# Patient Record
Sex: FEMALE | HISPANIC OR LATINO
[De-identification: names, ages, dates, MRNs, and addresses within clinical notes are randomized per-mention and may not be internally consistent; named-entity substitution may affect disease eponyms.]

---

## 2017-07-28 PROBLEM — Z00.00 ENCOUNTER FOR PREVENTIVE HEALTH EXAMINATION: Status: ACTIVE | Noted: 2017-07-28

## 2017-08-17 ENCOUNTER — APPOINTMENT (OUTPATIENT)
Dept: GERIATRICS | Facility: CLINIC | Age: 82
End: 2017-08-17
Payer: MEDICARE

## 2017-08-17 VITALS
HEIGHT: 65 IN | RESPIRATION RATE: 15 BRPM | HEART RATE: 77 BPM | WEIGHT: 105.5 LBS | DIASTOLIC BLOOD PRESSURE: 60 MMHG | SYSTOLIC BLOOD PRESSURE: 130 MMHG | OXYGEN SATURATION: 98 % | BODY MASS INDEX: 17.58 KG/M2 | TEMPERATURE: 98.6 F

## 2017-08-17 LAB
ALBUMIN SERPL ELPH-MCNC: 4.4 G/DL
ALP BLD-CCNC: 68 U/L
ALT SERPL-CCNC: 19 U/L
ANION GAP SERPL CALC-SCNC: 16 MMOL/L
AST SERPL-CCNC: 34 U/L
BASOPHILS # BLD AUTO: 0.03 K/UL
BASOPHILS NFR BLD AUTO: 0.8 %
BILIRUB SERPL-MCNC: 0.3 MG/DL
BUN SERPL-MCNC: 13 MG/DL
CALCIUM SERPL-MCNC: 9.8 MG/DL
CHLORIDE SERPL-SCNC: 99 MMOL/L
CO2 SERPL-SCNC: 27 MMOL/L
CREAT SERPL-MCNC: 0.96 MG/DL
EOSINOPHIL # BLD AUTO: 0.11 K/UL
EOSINOPHIL NFR BLD AUTO: 3.1 %
FOLATE SERPL-MCNC: >20 NG/ML
GLUCOSE SERPL-MCNC: 82 MG/DL
HCT VFR BLD CALC: 36.9 %
HGB BLD-MCNC: 12.1 G/DL
IMM GRANULOCYTES NFR BLD AUTO: 0 %
LYMPHOCYTES # BLD AUTO: 1.41 K/UL
LYMPHOCYTES NFR BLD AUTO: 39.4 %
MAN DIFF?: NORMAL
MCHC RBC-ENTMCNC: 28.9 PG
MCHC RBC-ENTMCNC: 32.8 GM/DL
MCV RBC AUTO: 88.3 FL
MONOCYTES # BLD AUTO: 0.3 K/UL
MONOCYTES NFR BLD AUTO: 8.4 %
NEUTROPHILS # BLD AUTO: 1.73 K/UL
NEUTROPHILS NFR BLD AUTO: 48.3 %
PLATELET # BLD AUTO: 274 K/UL
POTASSIUM SERPL-SCNC: 4.3 MMOL/L
PROT SERPL-MCNC: 8 G/DL
RBC # BLD: 4.18 M/UL
RBC # FLD: 14.2 %
SODIUM SERPL-SCNC: 142 MMOL/L
TSH SERPL-ACNC: 1.99 UIU/ML
VIT B12 SERPL-MCNC: 1671 PG/ML
WBC # FLD AUTO: 3.58 K/UL

## 2017-08-17 PROCEDURE — 99214 OFFICE O/P EST MOD 30 MIN: CPT

## 2017-09-18 ENCOUNTER — TRANSCRIPTION ENCOUNTER (OUTPATIENT)
Age: 82
End: 2017-09-18

## 2018-02-15 ENCOUNTER — APPOINTMENT (OUTPATIENT)
Dept: GERIATRICS | Facility: CLINIC | Age: 83
End: 2018-02-15
Payer: MEDICARE

## 2018-02-15 VITALS
HEIGHT: 65 IN | DIASTOLIC BLOOD PRESSURE: 60 MMHG | HEART RATE: 87 BPM | BODY MASS INDEX: 17.91 KG/M2 | TEMPERATURE: 98.6 F | WEIGHT: 107.5 LBS | RESPIRATION RATE: 15 BRPM | SYSTOLIC BLOOD PRESSURE: 120 MMHG | OXYGEN SATURATION: 98 %

## 2018-02-15 PROCEDURE — 99214 OFFICE O/P EST MOD 30 MIN: CPT

## 2018-02-19 LAB
ANION GAP SERPL CALC-SCNC: 16 MMOL/L
APPEARANCE: CLEAR
BASOPHILS # BLD AUTO: 0.03 K/UL
BASOPHILS NFR BLD AUTO: 0.5 %
BILIRUBIN URINE: NEGATIVE
BLOOD URINE: NEGATIVE
BUN SERPL-MCNC: 16 MG/DL
CALCIUM SERPL-MCNC: 9.9 MG/DL
CHLORIDE SERPL-SCNC: 97 MMOL/L
CO2 SERPL-SCNC: 26 MMOL/L
COLOR: YELLOW
CREAT SERPL-MCNC: 1.01 MG/DL
EOSINOPHIL # BLD AUTO: 0.17 K/UL
EOSINOPHIL NFR BLD AUTO: 2.8 %
GLUCOSE QUALITATIVE U: NEGATIVE MG/DL
GLUCOSE SERPL-MCNC: 94 MG/DL
HCT VFR BLD CALC: 37.6 %
HGB BLD-MCNC: 12.6 G/DL
IMM GRANULOCYTES NFR BLD AUTO: 0.2 %
KETONES URINE: NEGATIVE
LEUKOCYTE ESTERASE URINE: NEGATIVE
LYMPHOCYTES # BLD AUTO: 1.38 K/UL
LYMPHOCYTES NFR BLD AUTO: 22.4 %
MAN DIFF?: NORMAL
MCHC RBC-ENTMCNC: 30 PG
MCHC RBC-ENTMCNC: 33.5 GM/DL
MCV RBC AUTO: 89.5 FL
MONOCYTES # BLD AUTO: 0.51 K/UL
MONOCYTES NFR BLD AUTO: 8.3 %
NEUTROPHILS # BLD AUTO: 4.06 K/UL
NEUTROPHILS NFR BLD AUTO: 65.8 %
NITRITE URINE: NEGATIVE
PH URINE: 7
PLATELET # BLD AUTO: 242 K/UL
POTASSIUM SERPL-SCNC: 5.1 MMOL/L
PROTEIN URINE: NEGATIVE MG/DL
RBC # BLD: 4.2 M/UL
RBC # FLD: 14.4 %
SODIUM SERPL-SCNC: 139 MMOL/L
SPECIFIC GRAVITY URINE: 1.01
TSH SERPL-ACNC: 1.51 UIU/ML
UROBILINOGEN URINE: NEGATIVE MG/DL
WBC # FLD AUTO: 6.16 K/UL

## 2018-08-16 ENCOUNTER — APPOINTMENT (OUTPATIENT)
Dept: GERIATRICS | Facility: CLINIC | Age: 83
End: 2018-08-16
Payer: MEDICARE

## 2018-08-16 VITALS
DIASTOLIC BLOOD PRESSURE: 60 MMHG | SYSTOLIC BLOOD PRESSURE: 112 MMHG | OXYGEN SATURATION: 98 % | RESPIRATION RATE: 16 BRPM | WEIGHT: 108 LBS | TEMPERATURE: 98.3 F | HEART RATE: 82 BPM | BODY MASS INDEX: 17.97 KG/M2

## 2018-08-16 PROCEDURE — 99214 OFFICE O/P EST MOD 30 MIN: CPT | Mod: GC

## 2018-08-17 LAB
ALBUMIN SERPL ELPH-MCNC: 4.2 G/DL
ALP BLD-CCNC: 58 U/L
ALT SERPL-CCNC: 40 U/L
ANION GAP SERPL CALC-SCNC: 13 MMOL/L
APPEARANCE: CLEAR
AST SERPL-CCNC: 52 U/L
BASOPHILS # BLD AUTO: 0.03 K/UL
BASOPHILS NFR BLD AUTO: 0.7 %
BILIRUB SERPL-MCNC: 0.2 MG/DL
BILIRUBIN URINE: NEGATIVE
BLOOD URINE: NEGATIVE
BUN SERPL-MCNC: 20 MG/DL
CALCIUM SERPL-MCNC: 9.5 MG/DL
CHLORIDE SERPL-SCNC: 101 MMOL/L
CHOLEST SERPL-MCNC: 198 MG/DL
CHOLEST/HDLC SERPL: 2.4 RATIO
CO2 SERPL-SCNC: 28 MMOL/L
COLOR: YELLOW
CREAT SERPL-MCNC: 1.12 MG/DL
EOSINOPHIL # BLD AUTO: 0.13 K/UL
EOSINOPHIL NFR BLD AUTO: 2.9 %
FOLATE SERPL-MCNC: 18.8 NG/ML
GLUCOSE QUALITATIVE U: NEGATIVE MG/DL
GLUCOSE SERPL-MCNC: 92 MG/DL
HCT VFR BLD CALC: 36.6 %
HDLC SERPL-MCNC: 84 MG/DL
HGB BLD-MCNC: 12 G/DL
IMM GRANULOCYTES NFR BLD AUTO: 0.4 %
KETONES URINE: NEGATIVE
LDLC SERPL CALC-MCNC: 100 MG/DL
LEUKOCYTE ESTERASE URINE: NEGATIVE
LYMPHOCYTES # BLD AUTO: 1.61 K/UL
LYMPHOCYTES NFR BLD AUTO: 35.9 %
MAN DIFF?: NORMAL
MCHC RBC-ENTMCNC: 30 PG
MCHC RBC-ENTMCNC: 32.8 GM/DL
MCV RBC AUTO: 91.5 FL
MONOCYTES # BLD AUTO: 0.36 K/UL
MONOCYTES NFR BLD AUTO: 8 %
NEUTROPHILS # BLD AUTO: 2.34 K/UL
NEUTROPHILS NFR BLD AUTO: 52.1 %
NITRITE URINE: NEGATIVE
PH URINE: 6.5
PLATELET # BLD AUTO: 260 K/UL
POTASSIUM SERPL-SCNC: 4.8 MMOL/L
PROT SERPL-MCNC: 7.8 G/DL
PROTEIN URINE: NEGATIVE MG/DL
RBC # BLD: 4 M/UL
RBC # FLD: 14.2 %
SODIUM SERPL-SCNC: 142 MMOL/L
SPECIFIC GRAVITY URINE: 1.02
TRIGL SERPL-MCNC: 70 MG/DL
TSH SERPL-ACNC: 1.98 UIU/ML
UROBILINOGEN URINE: NEGATIVE MG/DL
VIT B12 SERPL-MCNC: 1131 PG/ML
WBC # FLD AUTO: 4.49 K/UL

## 2019-02-14 ENCOUNTER — APPOINTMENT (OUTPATIENT)
Dept: GERIATRICS | Facility: CLINIC | Age: 84
End: 2019-02-14
Payer: MEDICARE

## 2019-02-14 VITALS
BODY MASS INDEX: 17.99 KG/M2 | HEIGHT: 65 IN | TEMPERATURE: 97.6 F | SYSTOLIC BLOOD PRESSURE: 100 MMHG | WEIGHT: 108 LBS | HEART RATE: 92 BPM | DIASTOLIC BLOOD PRESSURE: 60 MMHG | RESPIRATION RATE: 15 BRPM | OXYGEN SATURATION: 96 %

## 2019-02-14 PROCEDURE — 99214 OFFICE O/P EST MOD 30 MIN: CPT

## 2019-02-15 NOTE — PHYSICAL EXAM
[General Appearance - Alert] : alert [General Appearance - In No Acute Distress] : in no acute distress [Sclera] : the sclera and conjunctiva were normal [PERRL With Normal Accommodation] : pupils were equal in size, round, and reactive to light [Extraocular Movements] : extraocular movements were intact [Normal Oral Mucosa] : normal oral mucosa [No Oral Pallor] : no oral pallor [No Oral Cyanosis] : no oral cyanosis [Outer Ear] : the ears and nose were normal in appearance [Oropharynx] : The oropharynx was normal [Neck Appearance] : the appearance of the neck was normal [Neck Cervical Mass (___cm)] : no neck mass was observed [Jugular Venous Distention Increased] : there was no jugular-venous distention [Thyroid Diffuse Enlargement] : the thyroid was not enlarged [Thyroid Nodule] : there were no palpable thyroid nodules [Auscultation Breath Sounds / Voice Sounds] : lungs were clear to auscultation bilaterally [Heart Rate And Rhythm] : heart rate was normal and rhythm regular [Heart Sounds] : normal S1 and S2 [Heart Sounds Gallop] : no gallops [Murmurs] : no murmurs [Heart Sounds Pericardial Friction Rub] : no pericardial rub [Full Pulse] : the pedal pulses are present [Edema] : there was no peripheral edema [Bowel Sounds] : normal bowel sounds [Abdomen Soft] : soft [Abdomen Tenderness] : non-tender [Abdomen Mass (___ Cm)] : no abdominal mass palpated [Cervical Lymph Nodes Enlarged Posterior Bilaterally] : posterior cervical [Cervical Lymph Nodes Enlarged Anterior Bilaterally] : anterior cervical [Supraclavicular Lymph Nodes Enlarged Bilaterally] : supraclavicular [Axillary Lymph Nodes Enlarged Bilaterally] : axillary [Femoral Lymph Nodes Enlarged Bilaterally] : femoral [Inguinal Lymph Nodes Enlarged Bilaterally] : inguinal [No CVA Tenderness] : no ~M costovertebral angle tenderness [No Spinal Tenderness] : no spinal tenderness [Abnormal Walk] : normal gait [Nail Clubbing] : no clubbing  or cyanosis of the fingernails [Musculoskeletal - Swelling] : no joint swelling seen [Motor Tone] : muscle strength and tone were normal [Skin Color & Pigmentation] : normal skin color and pigmentation [Skin Turgor] : normal skin turgor [] : no rash [Deep Tendon Reflexes (DTR)] : deep tendon reflexes were 2+ and symmetric [Sensation] : the sensory exam was normal to light touch and pinprick [No Focal Deficits] : no focal deficits [Total Score ___ / 30] : the patient achieved a score of [unfilled] /30 [Date / Time ___ / 5] : date / time [unfilled] / 5 [Place ___ / 5] : place [unfilled] / 5 [Registration ___ / 3] : registration [unfilled] / 3 [Serial Sevens ___/5] : serial sevens [unfilled] / 5 [Naming 2 Objects ___ / 2] : naming two objects [unfilled] / 2 [Repeating a Sentence ___ / 1] : repeating a sentence [unfilled] / 1 [Writing a Sentence ___ / 1] : write sentence [unfilled] / 1 [3-stage Verbal Command ___ / 3] : three-stage verbal command [unfilled] / 3 [Written Command ___ / 1] : written command [unfilled] / 1 [Copy a Design ___ / 1] : copy a design [unfilled] / 1 [Recall ___ / 3] : recall [unfilled] / 3 [Oriented To Time, Place, And Person] : oriented to person, place, and time [Impaired Insight] : insight and judgment were intact [Affect] : the affect was normal

## 2019-02-15 NOTE — SOCIAL HISTORY
[FreeTextEntry1] : son and dtr [FreeTextEntry2] : good [FreeTextEntry3] : senior program [FreeTextEntry4] : poor [No falls in past year] : Patient reported no falls in the past year [Fully functional (bathing, dressing, toileting, transferring, walking, feeding)] : Fully functional (bathing, dressing, toileting, transferring, walking, feeding) [Full assistance needed] : managing finances [de-identified] : needs guidance [de-identified] : none [de-identified] : No safety concerns identified. [de-identified] : No safety concerns identified.

## 2019-02-15 NOTE — ASSESSMENT
[FreeTextEntry1] : 82 yo female here for followup\par \par 1) Dementia - paranoia, - ngoes to day care and going well.  No weight loss. No agitation. Will increase donepezil.\par 2) BP low - consider decreasing losartan-hctz or\par 3) Weight - stable, actually up 3 pounds\par 4) Living with son - no issues. \par 5)  - has Primary MD , says she had pneumonia vaccine by PMD\par

## 2019-02-15 NOTE — HISTORY OF PRESENT ILLNESS
[FreeTextEntry1] : 82 yo female with dementia, htn, hypothyroid who is here for followup. She takes part in senior program 3 days  a week. There seem to be no issues. No paranoia, she is getting along with people.  She has no agitation acc to son. Pt lives with son who is an [employer] .  No ed visits. No hospitalizations. Eating well. [Moderate] : Stage: Moderate [Stable] : Status: Stable [None] : The patient is currently asymptomatic [0] : 2) Feeling down, depressed, or hopeless: Not at all

## 2019-06-18 ENCOUNTER — TRANSCRIPTION ENCOUNTER (OUTPATIENT)
Age: 84
End: 2019-06-18

## 2019-08-29 ENCOUNTER — APPOINTMENT (OUTPATIENT)
Dept: GERIATRICS | Facility: CLINIC | Age: 84
End: 2019-08-29
Payer: MEDICARE

## 2019-08-29 VITALS
RESPIRATION RATE: 15 BRPM | BODY MASS INDEX: 17.99 KG/M2 | WEIGHT: 108 LBS | TEMPERATURE: 98.7 F | OXYGEN SATURATION: 99 % | DIASTOLIC BLOOD PRESSURE: 60 MMHG | HEART RATE: 75 BPM | SYSTOLIC BLOOD PRESSURE: 100 MMHG | HEIGHT: 65 IN

## 2019-08-29 PROCEDURE — 99214 OFFICE O/P EST MOD 30 MIN: CPT

## 2019-08-29 NOTE — PHYSICAL EXAM
[General Appearance - Alert] : alert [General Appearance - In No Acute Distress] : in no acute distress [Sclera] : the sclera and conjunctiva were normal [Extraocular Movements] : extraocular movements were intact [Normal Oral Mucosa] : normal oral mucosa [PERRL With Normal Accommodation] : pupils were equal in size, round, and reactive to light [No Oral Pallor] : no oral pallor [Outer Ear] : the ears and nose were normal in appearance [No Oral Cyanosis] : no oral cyanosis [Oropharynx] : The oropharynx was normal [Neck Appearance] : the appearance of the neck was normal [Neck Cervical Mass (___cm)] : no neck mass was observed [Jugular Venous Distention Increased] : there was no jugular-venous distention [Thyroid Diffuse Enlargement] : the thyroid was not enlarged [Thyroid Nodule] : there were no palpable thyroid nodules [Auscultation Breath Sounds / Voice Sounds] : lungs were clear to auscultation bilaterally [Heart Rate And Rhythm] : heart rate was normal and rhythm regular [Heart Sounds] : normal S1 and S2 [Heart Sounds Gallop] : no gallops [Murmurs] : no murmurs [Heart Sounds Pericardial Friction Rub] : no pericardial rub [Full Pulse] : the pedal pulses are present [Edema] : there was no peripheral edema [Bowel Sounds] : normal bowel sounds [Abdomen Soft] : soft [Abdomen Tenderness] : non-tender [Abdomen Mass (___ Cm)] : no abdominal mass palpated [Cervical Lymph Nodes Enlarged Posterior Bilaterally] : posterior cervical [Supraclavicular Lymph Nodes Enlarged Bilaterally] : supraclavicular [Cervical Lymph Nodes Enlarged Anterior Bilaterally] : anterior cervical [Axillary Lymph Nodes Enlarged Bilaterally] : axillary [Femoral Lymph Nodes Enlarged Bilaterally] : femoral [Inguinal Lymph Nodes Enlarged Bilaterally] : inguinal [No CVA Tenderness] : no ~M costovertebral angle tenderness [No Spinal Tenderness] : no spinal tenderness [Abnormal Walk] : normal gait [Nail Clubbing] : no clubbing  or cyanosis of the fingernails [Musculoskeletal - Swelling] : no joint swelling seen [Motor Tone] : muscle strength and tone were normal [Skin Color & Pigmentation] : normal skin color and pigmentation [Skin Turgor] : normal skin turgor [] : no rash [Deep Tendon Reflexes (DTR)] : deep tendon reflexes were 2+ and symmetric [Sensation] : the sensory exam was normal to light touch and pinprick [No Focal Deficits] : no focal deficits [Total Score ___ / 30] : the patient achieved a score of [unfilled] /30 [Date / Time ___ / 5] : date / time [unfilled] / 5 [Place ___ / 5] : place [unfilled] / 5 [Registration ___ / 3] : registration [unfilled] / 3 [Serial Sevens ___/5] : serial sevens [unfilled] / 5 [Naming 2 Objects ___ / 2] : naming two objects [unfilled] / 2 [Repeating a Sentence ___ / 1] : repeating a sentence [unfilled] / 1 [Writing a Sentence ___ / 1] : write sentence [unfilled] / 1 [3-stage Verbal Command ___ / 3] : three-stage verbal command [unfilled] / 3 [Written Command ___ / 1] : written command [unfilled] / 1 [Copy a Design ___ / 1] : copy a design [unfilled] / 1 [Recall ___ / 3] : recall [unfilled] / 3 [Impaired Insight] : insight and judgment were intact [Oriented To Time, Place, And Person] : oriented to person, place, and time [Affect] : the affect was normal

## 2019-09-02 NOTE — HISTORY OF PRESENT ILLNESS
[Moderate] : Stage: Moderate [Stable] : Status: Stable [None] : The patient is currently asymptomatic [0] : 2) Feeling down, depressed, or hopeless: Not at all [FreeTextEntry1] : 84 yo female, [place] speaking, with dementia who had recent neuropsychological testing in [place] at [facility] that susggests need for psychopharmological intervention for paranoia.  Pt without significant change in cognitive function. Lives with son.  No safety issues. No falls.  \par \par Saw PMD following a fall. Pt felt nauseous and light headed, had low bp, saw PMD next day. Pt felt better. PMD is cardiologist Dr Leblanc.  \par \par Pt now with low bp but feels well. SBP = 100\par \par No falls, denies dizziness or feeling weak.

## 2019-09-02 NOTE — SOCIAL HISTORY
[No falls in past year] : Patient reported no falls in the past year [Independent] : walking [Some assistance needed] : managing finances [FreeTextEntry1] : son [FreeTextEntry2] : good [FreeTextEntry4] : good [de-identified] : No safety concerns identified. [de-identified] : none [de-identified] : No safety concerns identified.

## 2019-09-02 NOTE — ASSESSMENT
[FreeTextEntry1] : 82 yo Swedish speaking female with dementia, now with low bp, recent fall.\par \par 1) Low BP - stop lisinopril - hctz \par 2) Dementia - continue aricept\par 3) paranoia - discussed antipsychotic vs depression meds. Pt not sad,  Family does not feel paranoia is scary or contributing to pt being unsafe or traumatized. No meds at this time to treat paranoia. Will reconsider if worsens or changes.\par 4) Fall - stop lisinopril- hctz, repeat BP in a months time. WIll notify Dr Leblanc office of change in medication.\par 5) Immunizations - flu shot in October due.\par \par Return in one month. Pt mainly here to see me for dementia but am addressing BP bc low and affecting function recently.

## 2019-09-24 ENCOUNTER — TRANSCRIPTION ENCOUNTER (OUTPATIENT)
Age: 84
End: 2019-09-24

## 2019-09-30 ENCOUNTER — TRANSCRIPTION ENCOUNTER (OUTPATIENT)
Age: 84
End: 2019-09-30

## 2019-11-06 ENCOUNTER — TRANSCRIPTION ENCOUNTER (OUTPATIENT)
Age: 84
End: 2019-11-06

## 2019-12-09 ENCOUNTER — TRANSCRIPTION ENCOUNTER (OUTPATIENT)
Age: 84
End: 2019-12-09

## 2019-12-11 ENCOUNTER — TRANSCRIPTION ENCOUNTER (OUTPATIENT)
Age: 84
End: 2019-12-11

## 2020-02-27 ENCOUNTER — APPOINTMENT (OUTPATIENT)
Dept: GERIATRICS | Facility: CLINIC | Age: 85
End: 2020-02-27
Payer: MEDICARE

## 2020-02-27 VITALS — DIASTOLIC BLOOD PRESSURE: 80 MMHG | SYSTOLIC BLOOD PRESSURE: 142 MMHG

## 2020-02-27 VITALS
TEMPERATURE: 98.2 F | HEIGHT: 60 IN | BODY MASS INDEX: 22.1 KG/M2 | WEIGHT: 112.6 LBS | RESPIRATION RATE: 15 BRPM | HEART RATE: 82 BPM | OXYGEN SATURATION: 98 %

## 2020-02-27 PROCEDURE — 99483 ASSMT & CARE PLN PT COG IMP: CPT

## 2020-02-27 RX ORDER — LISINOPRIL AND HYDROCHLOROTHIAZIDE TABLETS 20; 12.5 MG/1; MG/1
20-12.5 TABLET ORAL DAILY
Refills: 0 | Status: DISCONTINUED | COMMUNITY
Start: 2018-02-15 | End: 2020-02-27

## 2020-04-15 ENCOUNTER — RX RENEWAL (OUTPATIENT)
Age: 85
End: 2020-04-15

## 2020-06-16 ENCOUNTER — TRANSCRIPTION ENCOUNTER (OUTPATIENT)
Age: 85
End: 2020-06-16

## 2020-09-04 ENCOUNTER — TRANSCRIPTION ENCOUNTER (OUTPATIENT)
Age: 85
End: 2020-09-04

## 2020-09-24 ENCOUNTER — APPOINTMENT (OUTPATIENT)
Dept: GERIATRICS | Facility: CLINIC | Age: 85
End: 2020-09-24
Payer: MEDICARE

## 2020-09-24 VITALS
HEART RATE: 90 BPM | HEIGHT: 62.4 IN | RESPIRATION RATE: 16 BRPM | WEIGHT: 110 LBS | TEMPERATURE: 98 F | DIASTOLIC BLOOD PRESSURE: 80 MMHG | SYSTOLIC BLOOD PRESSURE: 140 MMHG | BODY MASS INDEX: 19.99 KG/M2 | OXYGEN SATURATION: 96 %

## 2020-09-24 DIAGNOSIS — Z23 ENCOUNTER FOR IMMUNIZATION: ICD-10-CM

## 2020-09-24 PROCEDURE — 99215 OFFICE O/P EST HI 40 MIN: CPT | Mod: 25

## 2020-09-24 PROCEDURE — 90662 IIV NO PRSV INCREASED AG IM: CPT

## 2020-09-24 PROCEDURE — G0008: CPT

## 2020-09-24 RX ORDER — MULTIVITAMIN/IRON/FOLIC ACID 18MG-0.4MG
600-400 TABLET ORAL
Qty: 30 | Refills: 5 | Status: ACTIVE | COMMUNITY
Start: 2020-09-24

## 2020-09-24 NOTE — ASSESSMENT
[Designated Healthcare Proxy] : Designated healthcare proxy [Name: ___] : Health Care Proxy's Name: [unfilled]  [Relationship: ___] : Relationship: [unfilled]

## 2020-09-27 NOTE — SOCIAL HISTORY
[No falls in past year] : Patient reported no falls in the past year [Independent] : feeding [Some assistance needed] : doing laundry [Full assistance needed] : managing finances [Smoke Detector] : smoke detector [Carbon Monoxide Detector] : carbon monoxide detector [Safety elements used in home] : safety elements used in home [Shower Chair] : shower chair [Night Light] : night light [Anti-Slip Measures] : anti-slip measures [FreeTextEntry1] : dtr [FreeTextEntry2] : good [FreeTextEntry4] : good [Guns at Home] : no guns at home [Grab Bars] : no grab bars [de-identified] : none [de-identified] : No safety concerns identified.

## 2020-09-27 NOTE — PHYSICAL EXAM
[General Appearance - Alert] : alert [General Appearance - In No Acute Distress] : in no acute distress [Sclera] : the sclera and conjunctiva were normal [PERRL With Normal Accommodation] : pupils were equal in size, round, and reactive to light [Extraocular Movements] : extraocular movements were intact [Normal Oral Mucosa] : normal oral mucosa [No Oral Pallor] : no oral pallor [No Oral Cyanosis] : no oral cyanosis [Outer Ear] : the ears and nose were normal in appearance [Oropharynx] : The oropharynx was normal [Neck Appearance] : the appearance of the neck was normal [Neck Cervical Mass (___cm)] : no neck mass was observed [Jugular Venous Distention Increased] : there was no jugular-venous distention [Thyroid Diffuse Enlargement] : the thyroid was not enlarged [Thyroid Nodule] : there were no palpable thyroid nodules [Auscultation Breath Sounds / Voice Sounds] : lungs were clear to auscultation bilaterally [Heart Rate And Rhythm] : heart rate was normal and rhythm regular [Heart Sounds] : normal S1 and S2 [Heart Sounds Gallop] : no gallops [Murmurs] : no murmurs [Heart Sounds Pericardial Friction Rub] : no pericardial rub [Full Pulse] : the pedal pulses are present [Edema] : there was no peripheral edema [Bowel Sounds] : normal bowel sounds [Abdomen Soft] : soft [Abdomen Tenderness] : non-tender [Abdomen Mass (___ Cm)] : no abdominal mass palpated [Cervical Lymph Nodes Enlarged Posterior Bilaterally] : posterior cervical [Cervical Lymph Nodes Enlarged Anterior Bilaterally] : anterior cervical [Supraclavicular Lymph Nodes Enlarged Bilaterally] : supraclavicular [Axillary Lymph Nodes Enlarged Bilaterally] : axillary [Femoral Lymph Nodes Enlarged Bilaterally] : femoral [Inguinal Lymph Nodes Enlarged Bilaterally] : inguinal [No CVA Tenderness] : no ~M costovertebral angle tenderness [No Spinal Tenderness] : no spinal tenderness [Abnormal Walk] : normal gait [Nail Clubbing] : no clubbing  or cyanosis of the fingernails [Musculoskeletal - Swelling] : no joint swelling seen [Motor Tone] : muscle strength and tone were normal [Skin Color & Pigmentation] : normal skin color and pigmentation [Skin Turgor] : normal skin turgor [] : no rash [Deep Tendon Reflexes (DTR)] : deep tendon reflexes were 2+ and symmetric [Sensation] : the sensory exam was normal to light touch and pinprick [No Focal Deficits] : no focal deficits [Oriented To Time, Place, And Person] : oriented to person, place, and time [Impaired Insight] : insight and judgment were intact [Affect] : the affect was normal [FreeTextEntry1] : MMSE in Indonesian = 17/30

## 2020-09-27 NOTE — HISTORY OF PRESENT ILLNESS
[Moderate] : Stage: Moderate [Worse] : Status: Worse [Memory Lapses Or Loss] : worsened memory impairment [Hostility Toward Caregivers] : denies aggression [Sleep Disturbances] : denies sleep disturbances [] : denies wandering [Fixed Beliefs Contradicted By Reality (Delusions)] : denies delusions [FreeTextEntry1] : 83 yo female who is brought into office by lauren, a pharmacist.  Pt moved into daughter's house two weeks ago bc son moved to [place]. Son retired from [employer]. \par Pt getting adjusted to new home and there have been some safety issues as pt less likely to be able to learn new behavior due to dementia.  Pt is Azerbaijani speaking and states all is well.  \par \par Pt is sleeping well, no agitation, but more nervous.  Pt eating well\par \par Safety concern: Pt in new home with gas stove, used to electric stove. Dtr is working.  [facility] is [facility] [employer] [employer] - [facility] well. Nurse has or is to evaluate pt.\par \par Unclear if nurse evaluating pt speaks Azerbaijani. Pt socially pleasant and sweet but in speaking iwht pt she asks questions repeatedly and show anxiety and fear. MMSE to be done in Bengali at this visit.\par \par No falls, no ed\par Hasnt been [facility] any social day programs [place] [facility] no plan on them opening [de-identified] : repeating things 3-4 times in 5-7 monutes, misplacing things. doesn’t know where she is. THinks she is in [place]. Does not know her address.  Staying at home mainly during covid.

## 2021-01-12 ENCOUNTER — APPOINTMENT (OUTPATIENT)
Dept: GERIATRICS | Facility: CLINIC | Age: 86
End: 2021-01-12
Payer: MEDICARE

## 2021-01-12 VITALS
BODY MASS INDEX: 20.04 KG/M2 | SYSTOLIC BLOOD PRESSURE: 140 MMHG | TEMPERATURE: 97.8 F | WEIGHT: 111 LBS | OXYGEN SATURATION: 98 % | HEART RATE: 86 BPM | RESPIRATION RATE: 18 BRPM | DIASTOLIC BLOOD PRESSURE: 76 MMHG

## 2021-01-12 PROCEDURE — 99215 OFFICE O/P EST HI 40 MIN: CPT

## 2021-01-12 PROCEDURE — 99204 OFFICE O/P NEW MOD 45 MIN: CPT

## 2021-01-18 NOTE — PHYSICAL EXAM
[General Appearance - Alert] : alert [General Appearance - In No Acute Distress] : in no acute distress [Sclera] : the sclera and conjunctiva were normal [Extraocular Movements] : extraocular movements were intact [PERRL With Normal Accommodation] : pupils were equal in size, round, and reactive to light [Normal Oral Mucosa] : normal oral mucosa [No Oral Pallor] : no oral pallor [No Oral Cyanosis] : no oral cyanosis [Outer Ear] : the ears and nose were normal in appearance [Neck Appearance] : the appearance of the neck was normal [Oropharynx] : The oropharynx was normal [Neck Cervical Mass (___cm)] : no neck mass was observed [Jugular Venous Distention Increased] : there was no jugular-venous distention [Thyroid Diffuse Enlargement] : the thyroid was not enlarged [Thyroid Nodule] : there were no palpable thyroid nodules [Auscultation Breath Sounds / Voice Sounds] : lungs were clear to auscultation bilaterally [Heart Rate And Rhythm] : heart rate was normal and rhythm regular [Heart Sounds] : normal S1 and S2 [Heart Sounds Gallop] : no gallops [Murmurs] : no murmurs [Heart Sounds Pericardial Friction Rub] : no pericardial rub [Full Pulse] : the pedal pulses are present [Edema] : there was no peripheral edema [Bowel Sounds] : normal bowel sounds [Abdomen Soft] : soft [Abdomen Tenderness] : non-tender [Abdomen Mass (___ Cm)] : no abdominal mass palpated [Cervical Lymph Nodes Enlarged Posterior Bilaterally] : posterior cervical [Cervical Lymph Nodes Enlarged Anterior Bilaterally] : anterior cervical [Supraclavicular Lymph Nodes Enlarged Bilaterally] : supraclavicular [Axillary Lymph Nodes Enlarged Bilaterally] : axillary [Femoral Lymph Nodes Enlarged Bilaterally] : femoral [Inguinal Lymph Nodes Enlarged Bilaterally] : inguinal [No CVA Tenderness] : no ~M costovertebral angle tenderness [No Spinal Tenderness] : no spinal tenderness [Abnormal Walk] : normal gait [Nail Clubbing] : no clubbing  or cyanosis of the fingernails [Musculoskeletal - Swelling] : no joint swelling seen [Motor Tone] : muscle strength and tone were normal [Skin Color & Pigmentation] : normal skin color and pigmentation [Skin Turgor] : normal skin turgor [] : no rash [Sensation] : the sensory exam was normal to light touch and pinprick [Deep Tendon Reflexes (DTR)] : deep tendon reflexes were 2+ and symmetric [No Focal Deficits] : no focal deficits [Oriented To Time, Place, And Person] : oriented to person, place, and time [Impaired Insight] : insight and judgment were intact [Affect] : the affect was normal

## 2021-01-18 NOTE — REVIEW OF SYSTEMS
[Confused] : confusion [As Noted in HPI] : as noted in HPI [Sleep Disturbances] : sleep disturbances [Negative] : Heme/Lymph

## 2021-01-18 NOTE — HISTORY OF PRESENT ILLNESS
[Moderate] : Stage: Moderate [Memory Lapses Or Loss] : worsened memory impairment [Hostility Toward Caregivers] : denies aggression [Sleep Disturbances] : worsened sleep disturbances [] : worsened wandering [Patient Observed To Be Agitated] : agitation [___] : [unfilled] [1] : 2) Feeling down, depressed, or hopeless for several days [FreeTextEntry1] : 86 yo female here for followup.  Pt living with dtr now, a pharmacist. Pt with dementia. Has stopped going to programs and is more isolated than in past years. Pt more sad and has had less socialization.  Pt eating well, Sleeping well. \par \par Dtr has noticed some agitation, poor sleep, wandering and dtr worried.\par Spent time discussing and educting dtr. \par \par No cough, fever, or known exposure to covid

## 2021-01-26 ENCOUNTER — NON-APPOINTMENT (OUTPATIENT)
Age: 86
End: 2021-01-26

## 2021-01-28 ENCOUNTER — NON-APPOINTMENT (OUTPATIENT)
Age: 86
End: 2021-01-28

## 2021-01-28 LAB
ALBUMIN SERPL ELPH-MCNC: 4.7 G/DL
ALP BLD-CCNC: 83 U/L
ALT SERPL-CCNC: 15 U/L
ANION GAP SERPL CALC-SCNC: 11 MMOL/L
AST SERPL-CCNC: 27 U/L
BASOPHILS # BLD AUTO: 0.05 K/UL
BASOPHILS NFR BLD AUTO: 1.3 %
BILIRUB SERPL-MCNC: 0.2 MG/DL
BUN SERPL-MCNC: 16 MG/DL
CALCIUM SERPL-MCNC: 9.3 MG/DL
CHLORIDE SERPL-SCNC: 101 MMOL/L
CHOLEST SERPL-MCNC: 230 MG/DL
CO2 SERPL-SCNC: 28 MMOL/L
CREAT SERPL-MCNC: 1.04 MG/DL
EOSINOPHIL # BLD AUTO: 0.13 K/UL
EOSINOPHIL NFR BLD AUTO: 3.3 %
FOLATE SERPL-MCNC: 10.6 NG/ML
GLUCOSE SERPL-MCNC: 76 MG/DL
HCT VFR BLD CALC: 40.6 %
HDLC SERPL-MCNC: 84 MG/DL
HGB BLD-MCNC: 13.1 G/DL
IMM GRANULOCYTES NFR BLD AUTO: 0.3 %
LDLC SERPL CALC-MCNC: 116 MG/DL
LYMPHOCYTES # BLD AUTO: 1.58 K/UL
LYMPHOCYTES NFR BLD AUTO: 40 %
MAN DIFF?: NORMAL
MCHC RBC-ENTMCNC: 30.3 PG
MCHC RBC-ENTMCNC: 32.3 GM/DL
MCV RBC AUTO: 93.8 FL
MONOCYTES # BLD AUTO: 0.37 K/UL
MONOCYTES NFR BLD AUTO: 9.4 %
NEUTROPHILS # BLD AUTO: 1.81 K/UL
NEUTROPHILS NFR BLD AUTO: 45.7 %
NONHDLC SERPL-MCNC: 146 MG/DL
PLATELET # BLD AUTO: 241 K/UL
POTASSIUM SERPL-SCNC: 4 MMOL/L
PROT SERPL-MCNC: 7.8 G/DL
RBC # BLD: 4.33 M/UL
RBC # FLD: 13.4 %
SODIUM SERPL-SCNC: 141 MMOL/L
TRIGL SERPL-MCNC: 152 MG/DL
TSH SERPL-ACNC: 0.65 UIU/ML
VIT B12 SERPL-MCNC: 1011 PG/ML
WBC # FLD AUTO: 3.95 K/UL

## 2021-02-09 ENCOUNTER — NON-APPOINTMENT (OUTPATIENT)
Age: 86
End: 2021-02-09

## 2021-02-09 ENCOUNTER — APPOINTMENT (OUTPATIENT)
Dept: OTOLARYNGOLOGY | Facility: CLINIC | Age: 86
End: 2021-02-09

## 2021-02-25 ENCOUNTER — APPOINTMENT (OUTPATIENT)
Dept: GERIATRICS | Facility: CLINIC | Age: 86
End: 2021-02-25

## 2021-05-11 ENCOUNTER — APPOINTMENT (OUTPATIENT)
Dept: GERIATRICS | Facility: CLINIC | Age: 86
End: 2021-05-11
Payer: MEDICARE

## 2021-05-11 VITALS
BODY MASS INDEX: 21.53 KG/M2 | HEART RATE: 96 BPM | TEMPERATURE: 98 F | HEIGHT: 62 IN | DIASTOLIC BLOOD PRESSURE: 80 MMHG | WEIGHT: 117 LBS | RESPIRATION RATE: 16 BRPM | OXYGEN SATURATION: 98 % | SYSTOLIC BLOOD PRESSURE: 138 MMHG

## 2021-05-11 PROCEDURE — 99214 OFFICE O/P EST MOD 30 MIN: CPT

## 2021-05-11 RX ORDER — NUT.TX.IMPAIRED DIGESTIVE FXN 0.035-1/ML
LIQUID (ML) ORAL
Qty: 30 | Refills: 5 | Status: COMPLETED | COMMUNITY
Start: 2017-08-17 | End: 2021-05-11

## 2021-05-11 NOTE — ASSESSMENT
[FreeTextEntry1] : 85 year old female with family involvement.\par \par 1) Dementia - MMSE in [place] 15/ 30 last MMSE was 17/30 in Sept 2020.  Lexapro started at last visit.  Tolerating. Will increase now for irritability\par Start seroquel 25 mg prn for paranoia.\par labs reviewed. all normal, mild cholesterol elevation\par continue donepezil and memantine\par \par Needs supervision bc of paranoia, hallucinations and decrease in MMSE.\par \par 2) Depression lexapro tolerated , will increase\par \par 3) HTN - bp controlled\par 4) Hypothyroid - tsh controlled\par 5) Urinary frequency - stable on trospium\par 6) COVID - receieved both vaccines in Jan - Feb\par \par Return in 3 months.\par

## 2021-05-11 NOTE — HISTORY OF PRESENT ILLNESS
[FreeTextEntry1] : 84 yo female with demnentia, htn, depression, hypothyroidism,and urinary frequency. No gait disorders. Pt repeatedl stating she has left knee pain and feels her leg is on left is more swollen.\par \par Pt with high school educaton.  [place]inks she is in [place], has no idea or date or year.\par \par Showing more paranoid, shutting shades so no one will be able to see her, more irritable, seeing people that arent there. Has stated her dead sister was vising one day for lunch.   Labs in January all normal\par \par Pt is irritable but not angry or hitting people.  \par \par No falls. Pt able to dress self and care for self.  No injuries\par \par  [No falls in past year] : Patient reported no falls in the past year [Independent] : walking [Some assistance needed] : feeding [Full assistance needed] : managing finances [Smoke Detector] : smoke detector [Guns at Home] : no guns at home [Carbon Monoxide Detector] : carbon monoxide detector [Safety elements used in home] : no safety elements used in home [Grab Bars] : grab bars [Shower Chair] : no shower chair [Night Light] : no night light [Anti-Slip Measures] : no anti-slip measures [de-identified] : none [de-identified] : she no longer lives with son who was a  [de-identified] : no concerns [[place]] : <10 [0] : 2) Feeling down, depressed, or hopeless: Not at all [PHQ-2 Score ___] : PHQ-2 Score [unfilled] [Moderate] : Stage: Moderate [Memory Lapses Or Loss] : worsened memory impairment [Patient Observed To Be Agitated] : worsened agitation [Hostility Toward Caregivers] : worsened aggression [Sleep Disturbances] : stable sleep disturbances [] : worsened wandering [Fixed Beliefs Contradicted By Reality (Delusions)] : worsened delusions [Difficulty Finding Desired Words] : stable difficulty finding desired words [de-identified] : hallucinating and paranoid [Reviewed no changes] : Reviewed no changes [Designated Healthcare Proxy] : Designated healthcare proxy [Name: ___] : Health Care Proxy's Name: [unfilled]  [Relationship: ___] : Relationship: [unfilled] [AdvancecareDate] : 05/2021 [FreeTextEntry4] : MOLST form given to daughter. Will discuss with son and at next visit we will address quesitong. HCP form at home and dtr will fax or bring at next appt.

## 2021-05-11 NOTE — PHYSICAL EXAM
[General Appearance - Alert] : alert [General Appearance - In No Acute Distress] : in no acute distress [Sclera] : the sclera and conjunctiva were normal [PERRL With Normal Accommodation] : pupils were equal in size, round, and reactive to light [Extraocular Movements] : extraocular movements were intact [No Oral Pallor] : no oral pallor [Normal Oral Mucosa] : normal oral mucosa [No Oral Cyanosis] : no oral cyanosis [Outer Ear] : the ears and nose were normal in appearance [Oropharynx] : The oropharynx was normal [Neck Appearance] : the appearance of the neck was normal [Neck Cervical Mass (___cm)] : no neck mass was observed [Jugular Venous Distention Increased] : there was no jugular-venous distention [Thyroid Diffuse Enlargement] : the thyroid was not enlarged [Thyroid Nodule] : there were no palpable thyroid nodules [Auscultation Breath Sounds / Voice Sounds] : lungs were clear to auscultation bilaterally [Heart Rate And Rhythm] : heart rate was normal and rhythm regular [Heart Sounds] : normal S1 and S2 [Heart Sounds Gallop] : no gallops [Murmurs] : no murmurs [Heart Sounds Pericardial Friction Rub] : no pericardial rub [Full Pulse] : the pedal pulses are present [Edema] : there was no peripheral edema [Bowel Sounds] : normal bowel sounds [Abdomen Soft] : soft [Abdomen Tenderness] : non-tender [Abdomen Mass (___ Cm)] : no abdominal mass palpated [Cervical Lymph Nodes Enlarged Posterior Bilaterally] : posterior cervical [Cervical Lymph Nodes Enlarged Anterior Bilaterally] : anterior cervical [Supraclavicular Lymph Nodes Enlarged Bilaterally] : supraclavicular [Axillary Lymph Nodes Enlarged Bilaterally] : axillary [Femoral Lymph Nodes Enlarged Bilaterally] : femoral [Inguinal Lymph Nodes Enlarged Bilaterally] : inguinal [No CVA Tenderness] : no ~M costovertebral angle tenderness [No Spinal Tenderness] : no spinal tenderness [Abnormal Walk] : normal gait [Nail Clubbing] : no clubbing  or cyanosis of the fingernails [Musculoskeletal - Swelling] : no joint swelling seen [Motor Tone] : muscle strength and tone were normal [FreeTextEntry1] : left claf 33 cm, right calf 31 1/2 , no pain subtle difference.   [Skin Color & Pigmentation] : normal skin color and pigmentation [Skin Turgor] : normal skin turgor [] : no rash [Deep Tendon Reflexes (DTR)] : deep tendon reflexes were 2+ and symmetric [Sensation] : the sensory exam was normal to light touch and pinprick [No Focal Deficits] : no focal deficits [MentalStatusTotal] : 15 /30 in Thai [Oriented To Time, Place, And Person] : oriented to person, place, and time [Impaired Insight] : insight and judgment were intact [Affect] : the affect was normal

## 2021-05-11 NOTE — REVIEW OF SYSTEMS
[As Noted in HPI] : as noted in HPI [Arthralgias] : arthralgias [Joint Pain] : joint pain [Joint Swelling] : no joint swelling [Joint Stiffness] : no joint stiffness [Limb Pain] : limb pain [Limb Swelling] : no limb swelling [Negative] : Heme/Lymph [FreeTextEntry9] : left knee and left leg pain. and complaint of left calf swelling

## 2021-05-13 ENCOUNTER — NON-APPOINTMENT (OUTPATIENT)
Age: 86
End: 2021-05-13

## 2021-08-17 ENCOUNTER — APPOINTMENT (OUTPATIENT)
Dept: GERIATRICS | Facility: CLINIC | Age: 86
End: 2021-08-17

## 2021-08-24 ENCOUNTER — APPOINTMENT (OUTPATIENT)
Dept: GERIATRICS | Facility: CLINIC | Age: 86
End: 2021-08-24
Payer: MEDICARE

## 2021-08-24 VITALS
HEIGHT: 62 IN | BODY MASS INDEX: 22.54 KG/M2 | HEART RATE: 88 BPM | OXYGEN SATURATION: 98 % | DIASTOLIC BLOOD PRESSURE: 78 MMHG | SYSTOLIC BLOOD PRESSURE: 122 MMHG | TEMPERATURE: 97.5 F | WEIGHT: 122.5 LBS | RESPIRATION RATE: 16 BRPM

## 2021-08-24 DIAGNOSIS — R63.4 ABNORMAL WEIGHT LOSS: ICD-10-CM

## 2021-08-24 PROCEDURE — 99213 OFFICE O/P EST LOW 20 MIN: CPT

## 2021-08-30 PROBLEM — R63.4 WEIGHT LOSS: Status: ACTIVE | Noted: 2017-08-17

## 2021-08-30 NOTE — PHYSICAL EXAM
[No Clubbing, Cyanosis] : no clubbing or cyanosis of the fingernails [No Joint Swelling] : no joint swelling seen [Motor Tone] : muscle strength and tone were normal [Normal] : no focal deficits [Normal Affect] : the affect was normal [Normal Mood] : the mood was normal

## 2021-08-30 NOTE — REVIEW OF SYSTEMS
[Arthralgias] : arthralgias [Joint Swelling] : no joint swelling [Joint Stiffness] : joint stiffness [Limb Pain] : limb pain [Negative] : Heme/Lymph [FreeTextEntry9] : pt has pain in legs bilaterally,

## 2021-08-30 NOTE — ASSESSMENT
[FreeTextEntry1] : 86 yo female with dementia\par \par 1) Dementia - donepezil unchanged\par memantine to once a day bc of somnolence in day\par seroquel , low dose, not used.\par 2) lLeg Pain bilaterally - legs stable. Leg 12 inches bilaerally\par \par 3) Depression - escitalopram \par \par 4) Psychosocial - 24 hour care at home in place and helping patient and family well.\par \par 5) HM - labs reviewed from Jan 2021\par \par 6) Incontinence - using trospium and tolerating. May consider stopping in future if not benefiting pt\par

## 2021-08-30 NOTE — HISTORY OF PRESENT ILLNESS
[FreeTextEntry1] : 84 yo female with dementia living with dtr. No safety evetns since last visit. No falls. No injuries. Dtr pharmaicst. Pt vaccinated and explained covid booster plans after sept 20.\par \par Pt missing day program. Pt now has 24 hour care at home which has helped pt tremendously, dtr very happy.\par \par Gave Rx for seroquel but dtr never used it. The help and redirecting pt has helped pt.   [Moderate] : Stage: Moderate [Worse] : Status: Worse [Memory Lapses Or Loss] : worsened memory impairment [Patient Observed To Be Agitated] : stable agitation [Hostility Toward Caregivers] : improved aggression

## 2022-02-22 ENCOUNTER — APPOINTMENT (OUTPATIENT)
Dept: GERIATRICS | Facility: CLINIC | Age: 87
End: 2022-02-22
Payer: MEDICARE

## 2022-02-22 VITALS
TEMPERATURE: 97.5 F | HEART RATE: 76 BPM | WEIGHT: 125.5 LBS | SYSTOLIC BLOOD PRESSURE: 142 MMHG | OXYGEN SATURATION: 99 % | BODY MASS INDEX: 23.1 KG/M2 | HEIGHT: 62 IN | RESPIRATION RATE: 16 BRPM | DIASTOLIC BLOOD PRESSURE: 80 MMHG

## 2022-02-22 PROCEDURE — 99214 OFFICE O/P EST MOD 30 MIN: CPT

## 2022-02-22 NOTE — SOCIAL HISTORY
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

## 2022-02-22 NOTE — HISTORY OF PRESENT ILLNESS
[FreeTextEntry1] : 85 yo female with dementia and brought in by dtr to office. Pt last seen Aug 2021.  No falls, eating well. Repaeaitng more. Went to visit son [place].  Pt was more confused in unfamiliar environment acc to son.  Pt restarted at day program at [facility] 3 days a week.  Dtr would like another day. \par \par No falls.\par Pt has 24 hour care at home. Eating well. Likes aides too.  \par Reviewed meds.\par Reviewed last labs - lipid panel mildly abnormal, discussed statin.  Pt sensitive to meds and pain it can cause in musculature. [No falls in past year] : Patient reported no falls in the past year [Independent] : transferring/mobility [] : Assistance needed with toileting. [0] : 2) Feeling down, depressed, or hopeless: Not at all (0) [Name: ___] : Health Care Proxy's Name: [unfilled]  [Relationship: ___] : Relationship: [unfilled]

## 2022-02-22 NOTE — ASSESSMENT
[FreeTextEntry1] : 87 yo fmeale with dementia\par \par 1) BP borderline , weight increased. If increased at next visit will start somehting.\par \par 2) Weight gain - will follow, appetite good\par \par 3)  Dementia - signs of continued decline. but safe\par \par 4) Depression - no evidence. COntinue lexapro for now.\par \par 5) Hypothyrodism - check  tsh\par \par 6) HM - up to date on immunizations, may need second shingrix but wait til determine need for another covid booster.

## 2022-02-22 NOTE — PHYSICAL EXAM
[No Clubbing, Cyanosis] : no clubbing or cyanosis of the fingernails [No Joint Swelling] : no joint swelling seen [Motor Tone] : muscle strength and tone were normal [Normal] : no focal deficits [Normal Affect] : the affect was normal [Normal Mood] : the mood was normal [de-identified] : repeating, pleasant

## 2022-02-23 LAB
ALBUMIN SERPL ELPH-MCNC: 4.4 G/DL
ALP BLD-CCNC: 94 U/L
ALT SERPL-CCNC: 12 U/L
ANION GAP SERPL CALC-SCNC: 11 MMOL/L
AST SERPL-CCNC: 25 U/L
BASOPHILS # BLD AUTO: 0.06 K/UL
BASOPHILS NFR BLD AUTO: 1.6 %
BILIRUB SERPL-MCNC: <0.2 MG/DL
BUN SERPL-MCNC: 18 MG/DL
CALCIUM SERPL-MCNC: 9.3 MG/DL
CHLORIDE SERPL-SCNC: 100 MMOL/L
CHOLEST SERPL-MCNC: 229 MG/DL
CHOLEST/HDLC SERPL: 3 RATIO
CO2 SERPL-SCNC: 28 MMOL/L
CREAT SERPL-MCNC: 0.99 MG/DL
EOSINOPHIL # BLD AUTO: 0.25 K/UL
EOSINOPHIL NFR BLD AUTO: 6.7 %
GLUCOSE SERPL-MCNC: 78 MG/DL
HCT VFR BLD CALC: 40.1 %
HDLC SERPL-MCNC: 75 MG/DL
HGB BLD-MCNC: 12.4 G/DL
IMM GRANULOCYTES NFR BLD AUTO: 0.3 %
LDLC SERPL CALC-MCNC: 118 MG/DL
LYMPHOCYTES # BLD AUTO: 1.82 K/UL
LYMPHOCYTES NFR BLD AUTO: 48.5 %
MAN DIFF?: NORMAL
MCHC RBC-ENTMCNC: 29.5 PG
MCHC RBC-ENTMCNC: 30.9 GM/DL
MCV RBC AUTO: 95.5 FL
MONOCYTES # BLD AUTO: 0.43 K/UL
MONOCYTES NFR BLD AUTO: 11.5 %
NEUTROPHILS # BLD AUTO: 1.18 K/UL
NEUTROPHILS NFR BLD AUTO: 31.4 %
NONHDLC SERPL-MCNC: 154 MG/DL
PLATELET # BLD AUTO: 283 K/UL
POTASSIUM SERPL-SCNC: 4.8 MMOL/L
PROT SERPL-MCNC: 7.2 G/DL
RBC # BLD: 4.2 M/UL
RBC # FLD: 13.9 %
SODIUM SERPL-SCNC: 139 MMOL/L
TRIGL SERPL-MCNC: 179 MG/DL
TSH SERPL-ACNC: 2.79 UIU/ML
WBC # FLD AUTO: 3.75 K/UL

## 2022-07-25 ENCOUNTER — NON-APPOINTMENT (OUTPATIENT)
Age: 87
End: 2022-07-25

## 2022-07-28 ENCOUNTER — APPOINTMENT (OUTPATIENT)
Dept: GERIATRICS | Facility: CLINIC | Age: 87
End: 2022-07-28

## 2022-07-28 ENCOUNTER — NON-APPOINTMENT (OUTPATIENT)
Age: 87
End: 2022-07-28

## 2022-07-28 DIAGNOSIS — Z02.9 ENCOUNTER FOR ADMINISTRATIVE EXAMINATIONS, UNSPECIFIED: ICD-10-CM

## 2022-07-28 DIAGNOSIS — B02.23 POSTHERPETIC POLYNEUROPATHY: ICD-10-CM

## 2022-07-28 PROCEDURE — 99213 OFFICE O/P EST LOW 20 MIN: CPT | Mod: 95

## 2022-07-31 PROBLEM — B02.23 SHINGLES (HERPES ZOSTER) POLYNEUROPATHY: Status: ACTIVE | Noted: 2022-07-26

## 2022-07-31 PROBLEM — Z02.9 ADMINISTRATIVE ENCOUNTER: Status: ACTIVE | Noted: 2022-07-28

## 2022-07-31 NOTE — REASON FOR VISIT
[Follow-Up] : a follow-up visit [Family Member] : family member [FreeTextEntry1] : herpes zoster in the neck  [FreeTextEntry3] : daughter

## 2022-07-31 NOTE — REVIEW OF SYSTEMS
[Skin Lesions] : skin lesion [Fever] : no fever [Chills] : no chills [Eye Pain] : no eye pain [Earache] : no earache [Chest Pain] : no chest pain [Palpitations] : no palpitations [Dizziness] : no dizziness [Fainting] : no fainting [Anxiety] : no anxiety [Depression] : no depression [Proptosis] : no proptosis [Muscle Weakness] : no muscle weakness

## 2022-07-31 NOTE — HISTORY OF PRESENT ILLNESS
[Home] : at home, [unfilled] , at the time of the visit. [Medical Office: ([facility])___] : at the medical office located in  [Family Member] : family member [Verbal consent obtained from patient] : the patient, [unfilled] [FreeTextEntry1] : 85 y/o F with pmhx of Dementia, HTN, hypothyroidism, OA. Telehealth encounter hold with patient through telehealth. Daughter and son in law present during the encounter. Appointment is for follow up after herpes infection in the right side of the neck and patient needs a letter before to come back to Adult day health program. Pt and pt's dtr reports that lesion have healed and pain has resolved. Pt received Valacyclovir. GAbapentin was ordered, but it was not used as pain is controlled now.  [FreeTextEntry3] : daughter

## 2022-07-31 NOTE — PHYSICAL EXAM
[Normal] : the sclera and conjunctiva were normal, extraocular movements were intact, pupils were equal in size, round, and reactive to light [No Focal Deficits] : no focal deficits [Normal Affect] : the affect was normal [de-identified] : multiple hyperpigmented lesion in the right side of the neck, no crust, no oozing, no vesicle

## 2022-08-08 NOTE — HISTORY OF PRESENT ILLNESS
[Home] : at home, [unfilled] , at the time of the visit. [Medical Office: ([facility])___] : at the medical office located in  Infant noted with HR ranging from 80-82 and unable to obtain an axillary temperature on 4T. Infant brought to 5T NBN [Family Member] : family member [Verbal consent obtained from patient] : the patient, [unfilled] [FreeTextEntry3] : daughter [FreeTextEntry1] : 85 y/o F with pmhx of Dementia, HTN, hypothyroidism, OA. Telehealth encounter hold with patient through telehealth. Daughter and son in law present during the encounter. Appointment is for follow up after herpes infection in the right side of the neck and patient needs a letter before to come back to Adult day health program. Pt and pt's dtr reports that lesion have healed and pain has resolved. Pt received Valacyclovir. GAbapentin was ordered, but it was not used as pain is controlled now.

## 2022-08-08 NOTE — PHYSICAL EXAM
[No Focal Deficits] : no focal deficits [Normal Affect] : the affect was normal [de-identified] : multiple hyperpigmented lesion in the right side of the neck, no crust, no oozing, no vesicle

## 2022-08-23 ENCOUNTER — APPOINTMENT (OUTPATIENT)
Dept: GERIATRICS | Facility: CLINIC | Age: 87
End: 2022-08-23

## 2022-08-23 VITALS
SYSTOLIC BLOOD PRESSURE: 148 MMHG | HEART RATE: 80 BPM | DIASTOLIC BLOOD PRESSURE: 80 MMHG | BODY MASS INDEX: 23.26 KG/M2 | RESPIRATION RATE: 16 BRPM | HEIGHT: 62.4 IN | OXYGEN SATURATION: 98 % | WEIGHT: 128 LBS

## 2022-08-23 PROCEDURE — 90677 PCV20 VACCINE IM: CPT

## 2022-08-23 PROCEDURE — G0009: CPT

## 2022-08-23 PROCEDURE — 99214 OFFICE O/P EST MOD 30 MIN: CPT | Mod: 25

## 2022-08-23 RX ORDER — GABAPENTIN 300 MG/1
300 CAPSULE ORAL
Qty: 60 | Refills: 2 | Status: COMPLETED | COMMUNITY
Start: 2022-07-26 | End: 2022-08-23

## 2022-08-27 NOTE — PHYSICAL EXAM
[Normal] : the sclera and conjunctiva were normal, extraocular movements were intact, pupils were equal in size, round, and reactive to light [No Focal Deficits] : no focal deficits [Normal Affect] : the affect was normal [de-identified] : multiple hyperpigmented lesion in the right side of the neck, no crust, no oozing, no vesicle

## 2022-08-27 NOTE — ASSESSMENT
[FreeTextEntry1] : 86 o with dementia, burping, no constipation\par \par 1) Burping - prilosec\par 2) CHeck labs\par 3) HM - prevnar 20 due.

## 2022-08-27 NOTE — HISTORY OF PRESENT ILLNESS
[FreeTextEntry1] : 85 yo female who recently had shingles, completed valacycolvir and had pain. Given gabapentin but pt never took.  Pt doing well. Here for update. Pt with aide. Gaining weight now.  Going back to her program in day. Dtr adjusting to having an aide in the home.\par \par Dtr asking about mammogram, bone density, colonoscopy and gyn exam . We had a long discussion about preventive health efforts in 85 yo woman with dementia...agreed to not do unless she has complaints.\par \par Pt is doing well, agitation controlled. Sleeping well.\par \par Pt having significant burping, for a long time, years.  [No falls in past year] : Patient reported no falls in the past year [Independent] : transferring/mobility [Completely Dependent] : Completely dependent. [0] : 2) Feeling down, depressed, or hopeless: Not at all (0) [Moderate] : Stage: Moderate [Memory Lapses Or Loss] : worsened memory impairment [Patient Observed To Be Agitated] : stable agitation [Hostility Toward Caregivers] : stable aggression [Sleep Disturbances] : denies sleep disturbances [] : denies wandering [Fixed Beliefs Contradicted By Reality (Delusions)] : denies delusions [Difficulty Finding Desired Words] : denies difficulty finding desired words [Designated Healthcare Proxy] : Designated healthcare proxy [Relationship: ___] : Relationship: [unfilled]

## 2022-09-02 LAB
25(OH)D3 SERPL-MCNC: 47.6 NG/ML
ALBUMIN SERPL ELPH-MCNC: 4.5 G/DL
ALP BLD-CCNC: 82 U/L
ALT SERPL-CCNC: 11 U/L
ANION GAP SERPL CALC-SCNC: 11 MMOL/L
AST SERPL-CCNC: 21 U/L
BASOPHILS # BLD AUTO: 0.07 K/UL
BASOPHILS NFR BLD AUTO: 1.2 %
BILIRUB SERPL-MCNC: 0.2 MG/DL
BUN SERPL-MCNC: 16 MG/DL
CALCIUM SERPL-MCNC: 9.4 MG/DL
CHLORIDE SERPL-SCNC: 102 MMOL/L
CHOLEST SERPL-MCNC: 252 MG/DL
CO2 SERPL-SCNC: 28 MMOL/L
CREAT SERPL-MCNC: 0.96 MG/DL
EGFR: 58 ML/MIN/1.73M2
EOSINOPHIL # BLD AUTO: 0.28 K/UL
EOSINOPHIL NFR BLD AUTO: 4.8 %
GLUCOSE SERPL-MCNC: 103 MG/DL
HCT VFR BLD CALC: 39.1 %
HDLC SERPL-MCNC: 76 MG/DL
HGB BLD-MCNC: 12.4 G/DL
IMM GRANULOCYTES NFR BLD AUTO: 0.3 %
LDLC SERPL CALC-MCNC: 149 MG/DL
LYMPHOCYTES # BLD AUTO: 2.06 K/UL
LYMPHOCYTES NFR BLD AUTO: 35.6 %
MAN DIFF?: NORMAL
MCHC RBC-ENTMCNC: 29.9 PG
MCHC RBC-ENTMCNC: 31.7 GM/DL
MCV RBC AUTO: 94.2 FL
MONOCYTES # BLD AUTO: 0.5 K/UL
MONOCYTES NFR BLD AUTO: 8.7 %
NEUTROPHILS # BLD AUTO: 2.85 K/UL
NEUTROPHILS NFR BLD AUTO: 49.4 %
NONHDLC SERPL-MCNC: 177 MG/DL
PLATELET # BLD AUTO: 297 K/UL
POTASSIUM SERPL-SCNC: 4.9 MMOL/L
PROT SERPL-MCNC: 7.3 G/DL
RBC # BLD: 4.15 M/UL
RBC # FLD: 14.3 %
SODIUM SERPL-SCNC: 141 MMOL/L
TRIGL SERPL-MCNC: 136 MG/DL
TSH SERPL-ACNC: 1.95 UIU/ML
WBC # FLD AUTO: 5.78 K/UL

## 2022-10-10 ENCOUNTER — RX RENEWAL (OUTPATIENT)
Age: 87
End: 2022-10-10

## 2022-10-24 DIAGNOSIS — R35.0 FREQUENCY OF MICTURITION: ICD-10-CM

## 2022-10-25 ENCOUNTER — LABORATORY RESULT (OUTPATIENT)
Age: 87
End: 2022-10-25

## 2022-10-27 LAB
APPEARANCE: CLEAR
BILIRUBIN URINE: NEGATIVE
BLOOD URINE: NEGATIVE
COLOR: NORMAL
GLUCOSE QUALITATIVE U: NEGATIVE
KETONES URINE: NEGATIVE
LEUKOCYTE ESTERASE URINE: ABNORMAL
NITRITE URINE: NEGATIVE
PH URINE: 6.5
PROTEIN URINE: NEGATIVE
SPECIFIC GRAVITY URINE: 1.01
UROBILINOGEN URINE: NORMAL

## 2023-01-02 ENCOUNTER — RX RENEWAL (OUTPATIENT)
Age: 88
End: 2023-01-02

## 2023-01-11 ENCOUNTER — NON-APPOINTMENT (OUTPATIENT)
Age: 88
End: 2023-01-11

## 2023-01-16 ENCOUNTER — INPATIENT (INPATIENT)
Facility: HOSPITAL | Age: 88
LOS: 2 days | Discharge: NURSING, REHAB OR LONG-TERM CARE | DRG: 86 | End: 2023-01-19
Attending: HOSPITALIST | Admitting: HOSPITALIST
Payer: MEDICARE

## 2023-01-16 VITALS
WEIGHT: 160.06 LBS | HEART RATE: 93 BPM | OXYGEN SATURATION: 99 % | HEIGHT: 64 IN | RESPIRATION RATE: 18 BRPM | SYSTOLIC BLOOD PRESSURE: 159 MMHG | DIASTOLIC BLOOD PRESSURE: 92 MMHG | TEMPERATURE: 98 F

## 2023-01-16 PROCEDURE — 99285 EMERGENCY DEPT VISIT HI MDM: CPT

## 2023-01-16 PROCEDURE — 70450 CT HEAD/BRAIN W/O DYE: CPT | Mod: 26,MA

## 2023-01-16 NOTE — ED ADULT TRIAGE NOTE - CHIEF COMPLAINT QUOTE
Patient BIBEMS from [facility] c/o neck pain. Patient A&Ox2-3, was sitting in the recliner and became agitated hitting the recliner back. Recliner fell back and hit the furens. Witnessed by staff. -head strike. Patient c/o neck pain only. On Lovenox. BOSSMAN Valdez called [phone].

## 2023-01-16 NOTE — ED ADULT NURSE NOTE - OBJECTIVE STATEMENT
Patient BIBEMS from [facility] c/o neck pain. Patient A&Ox2-3, was sitting in the recliner and became agitated hitting the recliner back. Recliner fell back and hit the furens. Witnessed by staff. -head strike. Patient c/o neck pain only. On Lovenox. BOSSMAN Valdez called [phone].

## 2023-01-17 DIAGNOSIS — S32.9XXA FRACTURE OF UNSPECIFIED PARTS OF LUMBOSACRAL SPINE AND PELVIS, INITIAL ENCOUNTER FOR CLOSED FRACTURE: ICD-10-CM

## 2023-01-17 DIAGNOSIS — R77.8 OTHER SPECIFIED ABNORMALITIES OF PLASMA PROTEINS: ICD-10-CM

## 2023-01-17 DIAGNOSIS — E78.5 HYPERLIPIDEMIA, UNSPECIFIED: ICD-10-CM

## 2023-01-17 LAB
ADD ON TEST-SPECIMEN IN LAB: SIGNIFICANT CHANGE UP
ALBUMIN SERPL ELPH-MCNC: 3.1 G/DL — LOW (ref 3.3–5)
ALBUMIN SERPL ELPH-MCNC: 3.3 G/DL — SIGNIFICANT CHANGE UP (ref 3.3–5)
ALP SERPL-CCNC: 78 U/L — SIGNIFICANT CHANGE UP (ref 40–120)
ALP SERPL-CCNC: 85 U/L — SIGNIFICANT CHANGE UP (ref 40–120)
ALT FLD-CCNC: 43 U/L — SIGNIFICANT CHANGE UP (ref 12–78)
ALT FLD-CCNC: 44 U/L — SIGNIFICANT CHANGE UP (ref 12–78)
ANION GAP SERPL CALC-SCNC: 10 MMOL/L — SIGNIFICANT CHANGE UP (ref 5–17)
ANION GAP SERPL CALC-SCNC: 7 MMOL/L — SIGNIFICANT CHANGE UP (ref 5–17)
AST SERPL-CCNC: 44 U/L — HIGH (ref 15–37)
AST SERPL-CCNC: 46 U/L — HIGH (ref 15–37)
BASOPHILS # BLD AUTO: 0.03 K/UL — SIGNIFICANT CHANGE UP (ref 0–0.2)
BASOPHILS # BLD AUTO: 0.04 K/UL — SIGNIFICANT CHANGE UP (ref 0–0.2)
BASOPHILS NFR BLD AUTO: 0.4 % — SIGNIFICANT CHANGE UP (ref 0–2)
BASOPHILS NFR BLD AUTO: 0.6 % — SIGNIFICANT CHANGE UP (ref 0–2)
BILIRUB SERPL-MCNC: 0.5 MG/DL — SIGNIFICANT CHANGE UP (ref 0.2–1.2)
BILIRUB SERPL-MCNC: 0.7 MG/DL — SIGNIFICANT CHANGE UP (ref 0.2–1.2)
BUN SERPL-MCNC: 14 MG/DL — SIGNIFICANT CHANGE UP (ref 7–23)
BUN SERPL-MCNC: 15 MG/DL — SIGNIFICANT CHANGE UP (ref 7–23)
CALCIUM SERPL-MCNC: 8.8 MG/DL — SIGNIFICANT CHANGE UP (ref 8.5–10.1)
CALCIUM SERPL-MCNC: 8.8 MG/DL — SIGNIFICANT CHANGE UP (ref 8.5–10.1)
CHLORIDE SERPL-SCNC: 101 MMOL/L — SIGNIFICANT CHANGE UP (ref 96–108)
CHLORIDE SERPL-SCNC: 99 MMOL/L — SIGNIFICANT CHANGE UP (ref 96–108)
CHOLEST SERPL-MCNC: 179 MG/DL — SIGNIFICANT CHANGE UP
CO2 SERPL-SCNC: 29 MMOL/L — SIGNIFICANT CHANGE UP (ref 22–31)
CO2 SERPL-SCNC: 30 MMOL/L — SIGNIFICANT CHANGE UP (ref 22–31)
CREAT SERPL-MCNC: 0.8 MG/DL — SIGNIFICANT CHANGE UP (ref 0.5–1.3)
CREAT SERPL-MCNC: 0.81 MG/DL — SIGNIFICANT CHANGE UP (ref 0.5–1.3)
EGFR: 71 ML/MIN/1.73M2 — SIGNIFICANT CHANGE UP
EGFR: 72 ML/MIN/1.73M2 — SIGNIFICANT CHANGE UP
EOSINOPHIL # BLD AUTO: 0.14 K/UL — SIGNIFICANT CHANGE UP (ref 0–0.5)
EOSINOPHIL # BLD AUTO: 0.2 K/UL — SIGNIFICANT CHANGE UP (ref 0–0.5)
EOSINOPHIL NFR BLD AUTO: 2 % — SIGNIFICANT CHANGE UP (ref 0–6)
EOSINOPHIL NFR BLD AUTO: 2.7 % — SIGNIFICANT CHANGE UP (ref 0–6)
FLUAV AG NPH QL: SIGNIFICANT CHANGE UP
FLUBV AG NPH QL: SIGNIFICANT CHANGE UP
GLUCOSE SERPL-MCNC: 108 MG/DL — HIGH (ref 70–99)
GLUCOSE SERPL-MCNC: 115 MG/DL — HIGH (ref 70–99)
HCT VFR BLD CALC: 30.8 % — LOW (ref 34.5–45)
HCT VFR BLD CALC: 32.8 % — LOW (ref 34.5–45)
HDLC SERPL-MCNC: 56 MG/DL — SIGNIFICANT CHANGE UP
HGB BLD-MCNC: 10.3 G/DL — LOW (ref 11.5–15.5)
HGB BLD-MCNC: 11.1 G/DL — LOW (ref 11.5–15.5)
IMM GRANULOCYTES NFR BLD AUTO: 0.6 % — SIGNIFICANT CHANGE UP (ref 0–0.9)
IMM GRANULOCYTES NFR BLD AUTO: 0.7 % — SIGNIFICANT CHANGE UP (ref 0–0.9)
LIPID PNL WITH DIRECT LDL SERPL: 104 MG/DL — HIGH
LYMPHOCYTES # BLD AUTO: 1.07 K/UL — SIGNIFICANT CHANGE UP (ref 1–3.3)
LYMPHOCYTES # BLD AUTO: 1.26 K/UL — SIGNIFICANT CHANGE UP (ref 1–3.3)
LYMPHOCYTES # BLD AUTO: 15.5 % — SIGNIFICANT CHANGE UP (ref 13–44)
LYMPHOCYTES # BLD AUTO: 17.1 % — SIGNIFICANT CHANGE UP (ref 13–44)
MCHC RBC-ENTMCNC: 30.2 PG — SIGNIFICANT CHANGE UP (ref 27–34)
MCHC RBC-ENTMCNC: 30.3 PG — SIGNIFICANT CHANGE UP (ref 27–34)
MCHC RBC-ENTMCNC: 33.4 GM/DL — SIGNIFICANT CHANGE UP (ref 32–36)
MCHC RBC-ENTMCNC: 33.8 GM/DL — SIGNIFICANT CHANGE UP (ref 32–36)
MCV RBC AUTO: 89.1 FL — SIGNIFICANT CHANGE UP (ref 80–100)
MCV RBC AUTO: 90.6 FL — SIGNIFICANT CHANGE UP (ref 80–100)
MONOCYTES # BLD AUTO: 0.59 K/UL — SIGNIFICANT CHANGE UP (ref 0–0.9)
MONOCYTES # BLD AUTO: 0.6 K/UL — SIGNIFICANT CHANGE UP (ref 0–0.9)
MONOCYTES NFR BLD AUTO: 8.2 % — SIGNIFICANT CHANGE UP (ref 2–14)
MONOCYTES NFR BLD AUTO: 8.5 % — SIGNIFICANT CHANGE UP (ref 2–14)
NEUTROPHILS # BLD AUTO: 5.04 K/UL — SIGNIFICANT CHANGE UP (ref 1.8–7.4)
NEUTROPHILS # BLD AUTO: 5.21 K/UL — SIGNIFICANT CHANGE UP (ref 1.8–7.4)
NEUTROPHILS NFR BLD AUTO: 70.9 % — SIGNIFICANT CHANGE UP (ref 43–77)
NEUTROPHILS NFR BLD AUTO: 72.8 % — SIGNIFICANT CHANGE UP (ref 43–77)
NON HDL CHOLESTEROL: 123 MG/DL — SIGNIFICANT CHANGE UP
NT-PROBNP SERPL-SCNC: 454 PG/ML — HIGH (ref 0–450)
PLATELET # BLD AUTO: 276 K/UL — SIGNIFICANT CHANGE UP (ref 150–400)
PLATELET # BLD AUTO: 291 K/UL — SIGNIFICANT CHANGE UP (ref 150–400)
POTASSIUM SERPL-MCNC: 3.9 MMOL/L — SIGNIFICANT CHANGE UP (ref 3.5–5.3)
POTASSIUM SERPL-MCNC: 3.9 MMOL/L — SIGNIFICANT CHANGE UP (ref 3.5–5.3)
POTASSIUM SERPL-SCNC: 3.9 MMOL/L — SIGNIFICANT CHANGE UP (ref 3.5–5.3)
POTASSIUM SERPL-SCNC: 3.9 MMOL/L — SIGNIFICANT CHANGE UP (ref 3.5–5.3)
PROT SERPL-MCNC: 7 GM/DL — SIGNIFICANT CHANGE UP (ref 6–8.3)
PROT SERPL-MCNC: 7.6 GM/DL — SIGNIFICANT CHANGE UP (ref 6–8.3)
RBC # BLD: 3.4 M/UL — LOW (ref 3.8–5.2)
RBC # BLD: 3.68 M/UL — LOW (ref 3.8–5.2)
RBC # FLD: 13.8 % — SIGNIFICANT CHANGE UP (ref 10.3–14.5)
RBC # FLD: 13.9 % — SIGNIFICANT CHANGE UP (ref 10.3–14.5)
RSV RNA NPH QL NAA+NON-PROBE: SIGNIFICANT CHANGE UP
SARS-COV-2 RNA SPEC QL NAA+PROBE: SIGNIFICANT CHANGE UP
SODIUM SERPL-SCNC: 138 MMOL/L — SIGNIFICANT CHANGE UP (ref 135–145)
SODIUM SERPL-SCNC: 138 MMOL/L — SIGNIFICANT CHANGE UP (ref 135–145)
TRIGL SERPL-MCNC: 98 MG/DL — SIGNIFICANT CHANGE UP
TROPONIN I, HIGH SENSITIVITY RESULT: 115.86 NG/L — HIGH
TROPONIN I, HIGH SENSITIVITY RESULT: 117.51 NG/L — HIGH
TSH SERPL-MCNC: 2.04 UU/ML — SIGNIFICANT CHANGE UP (ref 0.34–4.82)
WBC # BLD: 6.92 K/UL — SIGNIFICANT CHANGE UP (ref 3.8–10.5)
WBC # BLD: 7.35 K/UL — SIGNIFICANT CHANGE UP (ref 3.8–10.5)
WBC # FLD AUTO: 6.92 K/UL — SIGNIFICANT CHANGE UP (ref 3.8–10.5)
WBC # FLD AUTO: 7.35 K/UL — SIGNIFICANT CHANGE UP (ref 3.8–10.5)

## 2023-01-17 PROCEDURE — 71045 X-RAY EXAM CHEST 1 VIEW: CPT | Mod: 26

## 2023-01-17 PROCEDURE — G1004: CPT

## 2023-01-17 PROCEDURE — 76376 3D RENDER W/INTRP POSTPROCES: CPT

## 2023-01-17 PROCEDURE — 93010 ELECTROCARDIOGRAM REPORT: CPT

## 2023-01-17 PROCEDURE — 99497 ADVNCD CARE PLAN 30 MIN: CPT | Mod: 25

## 2023-01-17 PROCEDURE — 84484 ASSAY OF TROPONIN QUANT: CPT

## 2023-01-17 PROCEDURE — 73552 X-RAY EXAM OF FEMUR 2/>: CPT | Mod: LT

## 2023-01-17 PROCEDURE — 72192 CT PELVIS W/O DYE: CPT | Mod: ME

## 2023-01-17 PROCEDURE — 93306 TTE W/DOPPLER COMPLETE: CPT | Mod: 26

## 2023-01-17 PROCEDURE — 97162 PT EVAL MOD COMPLEX 30 MIN: CPT | Mod: GP

## 2023-01-17 PROCEDURE — 73502 X-RAY EXAM HIP UNI 2-3 VIEWS: CPT | Mod: 26,LT

## 2023-01-17 PROCEDURE — 72125 CT NECK SPINE W/O DYE: CPT | Mod: 26,MA

## 2023-01-17 PROCEDURE — 72192 CT PELVIS W/O DYE: CPT | Mod: 26,ME

## 2023-01-17 PROCEDURE — 76376 3D RENDER W/INTRP POSTPROCES: CPT | Mod: 26

## 2023-01-17 PROCEDURE — 97530 THERAPEUTIC ACTIVITIES: CPT | Mod: GP

## 2023-01-17 PROCEDURE — 99223 1ST HOSP IP/OBS HIGH 75: CPT

## 2023-01-17 PROCEDURE — 93306 TTE W/DOPPLER COMPLETE: CPT

## 2023-01-17 PROCEDURE — 73502 X-RAY EXAM HIP UNI 2-3 VIEWS: CPT | Mod: 26,LT,77

## 2023-01-17 PROCEDURE — 73502 X-RAY EXAM HIP UNI 2-3 VIEWS: CPT | Mod: LT

## 2023-01-17 PROCEDURE — 73552 X-RAY EXAM OF FEMUR 2/>: CPT | Mod: 26,LT

## 2023-01-17 PROCEDURE — 12345: CPT | Mod: NC

## 2023-01-17 PROCEDURE — 36415 COLL VENOUS BLD VENIPUNCTURE: CPT

## 2023-01-17 PROCEDURE — 99498 ADVNCD CARE PLAN ADDL 30 MIN: CPT

## 2023-01-17 PROCEDURE — 93005 ELECTROCARDIOGRAM TRACING: CPT

## 2023-01-17 PROCEDURE — 97116 GAIT TRAINING THERAPY: CPT | Mod: GP

## 2023-01-17 RX ORDER — POLYETHYLENE GLYCOL 3350 17 G/17G
17 POWDER, FOR SOLUTION ORAL
Qty: 0 | Refills: 0 | DISCHARGE

## 2023-01-17 RX ORDER — LEVOTHYROXINE SODIUM 125 MCG
1 TABLET ORAL
Qty: 0 | Refills: 0 | DISCHARGE

## 2023-01-17 RX ORDER — TRAMADOL HYDROCHLORIDE 50 MG/1
50 TABLET ORAL EVERY 4 HOURS
Refills: 0 | Status: DISCONTINUED | OUTPATIENT
Start: 2023-01-17 | End: 2023-01-19

## 2023-01-17 RX ORDER — ACETAMINOPHEN 500 MG
975 TABLET ORAL
Qty: 0 | Refills: 0 | DISCHARGE

## 2023-01-17 RX ORDER — ACETAMINOPHEN 500 MG
3 TABLET ORAL
Qty: 0 | Refills: 0 | DISCHARGE

## 2023-01-17 RX ORDER — MORPHINE SULFATE 50 MG/1
2 CAPSULE, EXTENDED RELEASE ORAL EVERY 4 HOURS
Refills: 0 | Status: DISCONTINUED | OUTPATIENT
Start: 2023-01-17 | End: 2023-01-17

## 2023-01-17 RX ORDER — ESCITALOPRAM OXALATE 10 MG/1
10 TABLET, FILM COATED ORAL DAILY
Refills: 0 | Status: DISCONTINUED | OUTPATIENT
Start: 2023-01-17 | End: 2023-01-19

## 2023-01-17 RX ORDER — ACETAMINOPHEN 500 MG
650 TABLET ORAL ONCE
Refills: 0 | Status: COMPLETED | OUTPATIENT
Start: 2023-01-17 | End: 2023-01-17

## 2023-01-17 RX ORDER — DONEPEZIL HYDROCHLORIDE 10 MG/1
1 TABLET, FILM COATED ORAL
Qty: 0 | Refills: 0 | DISCHARGE

## 2023-01-17 RX ORDER — ACETAMINOPHEN 500 MG
975 TABLET ORAL EVERY 8 HOURS
Refills: 0 | Status: DISCONTINUED | OUTPATIENT
Start: 2023-01-17 | End: 2023-01-19

## 2023-01-17 RX ORDER — ENOXAPARIN SODIUM 100 MG/ML
40 INJECTION SUBCUTANEOUS EVERY 24 HOURS
Refills: 0 | Status: DISCONTINUED | OUTPATIENT
Start: 2023-01-17 | End: 2023-01-19

## 2023-01-17 RX ORDER — ENOXAPARIN SODIUM 100 MG/ML
40 INJECTION SUBCUTANEOUS
Qty: 0 | Refills: 0 | DISCHARGE

## 2023-01-17 RX ORDER — PANTOPRAZOLE SODIUM 20 MG/1
40 TABLET, DELAYED RELEASE ORAL
Refills: 0 | Status: DISCONTINUED | OUTPATIENT
Start: 2023-01-17 | End: 2023-01-19

## 2023-01-17 RX ORDER — ONDANSETRON 8 MG/1
4 TABLET, FILM COATED ORAL EVERY 8 HOURS
Refills: 0 | Status: DISCONTINUED | OUTPATIENT
Start: 2023-01-17 | End: 2023-01-19

## 2023-01-17 RX ORDER — ASPIRIN/CALCIUM CARB/MAGNESIUM 324 MG
81 TABLET ORAL DAILY
Refills: 0 | Status: DISCONTINUED | OUTPATIENT
Start: 2023-01-18 | End: 2023-01-19

## 2023-01-17 RX ORDER — NALOXONE HYDROCHLORIDE 4 MG/.1ML
0.4 SPRAY NASAL ONCE
Refills: 0 | Status: DISCONTINUED | OUTPATIENT
Start: 2023-01-17 | End: 2023-01-19

## 2023-01-17 RX ORDER — OMEPRAZOLE 10 MG/1
1 CAPSULE, DELAYED RELEASE ORAL
Qty: 0 | Refills: 0 | DISCHARGE

## 2023-01-17 RX ORDER — ASPIRIN/CALCIUM CARB/MAGNESIUM 324 MG
162 TABLET ORAL ONCE
Refills: 0 | Status: COMPLETED | OUTPATIENT
Start: 2023-01-17 | End: 2023-01-17

## 2023-01-17 RX ORDER — TROSPIUM CHLORIDE 20 MG/1
1 TABLET, FILM COATED ORAL
Qty: 0 | Refills: 0 | DISCHARGE

## 2023-01-17 RX ORDER — NITROGLYCERIN 6.5 MG
0.4 CAPSULE, EXTENDED RELEASE ORAL
Refills: 0 | Status: DISCONTINUED | OUTPATIENT
Start: 2023-01-17 | End: 2023-01-19

## 2023-01-17 RX ORDER — ESCITALOPRAM OXALATE 10 MG/1
1 TABLET, FILM COATED ORAL
Qty: 0 | Refills: 0 | DISCHARGE

## 2023-01-17 RX ORDER — POLYETHYLENE GLYCOL 3350 17 G/17G
17 POWDER, FOR SOLUTION ORAL DAILY
Refills: 0 | Status: DISCONTINUED | OUTPATIENT
Start: 2023-01-17 | End: 2023-01-19

## 2023-01-17 RX ORDER — MEMANTINE HYDROCHLORIDE 10 MG/1
10 TABLET ORAL DAILY
Refills: 0 | Status: DISCONTINUED | OUTPATIENT
Start: 2023-01-17 | End: 2023-01-19

## 2023-01-17 RX ORDER — CYCLOBENZAPRINE HYDROCHLORIDE 10 MG/1
5 TABLET, FILM COATED ORAL EVERY 8 HOURS
Refills: 0 | Status: DISCONTINUED | OUTPATIENT
Start: 2023-01-17 | End: 2023-01-19

## 2023-01-17 RX ORDER — OXYBUTYNIN CHLORIDE 5 MG
5 TABLET ORAL
Refills: 0 | Status: DISCONTINUED | OUTPATIENT
Start: 2023-01-17 | End: 2023-01-19

## 2023-01-17 RX ORDER — LIDOCAINE 4 G/100G
1 CREAM TOPICAL DAILY
Refills: 0 | Status: DISCONTINUED | OUTPATIENT
Start: 2023-01-17 | End: 2023-01-19

## 2023-01-17 RX ORDER — LANOLIN ALCOHOL/MO/W.PET/CERES
3 CREAM (GRAM) TOPICAL AT BEDTIME
Refills: 0 | Status: DISCONTINUED | OUTPATIENT
Start: 2023-01-17 | End: 2023-01-19

## 2023-01-17 RX ORDER — TRAMADOL HYDROCHLORIDE 50 MG/1
1 TABLET ORAL
Qty: 0 | Refills: 0 | DISCHARGE

## 2023-01-17 RX ORDER — MORPHINE SULFATE 50 MG/1
4 CAPSULE, EXTENDED RELEASE ORAL EVERY 4 HOURS
Refills: 0 | Status: DISCONTINUED | OUTPATIENT
Start: 2023-01-17 | End: 2023-01-17

## 2023-01-17 RX ORDER — TRAMADOL HYDROCHLORIDE 50 MG/1
75 TABLET ORAL EVERY 4 HOURS
Refills: 0 | Status: DISCONTINUED | OUTPATIENT
Start: 2023-01-17 | End: 2023-01-17

## 2023-01-17 RX ORDER — HYDRALAZINE HCL 50 MG
10 TABLET ORAL EVERY 6 HOURS
Refills: 0 | Status: DISCONTINUED | OUTPATIENT
Start: 2023-01-17 | End: 2023-01-17

## 2023-01-17 RX ORDER — TRAMADOL HYDROCHLORIDE 50 MG/1
75 TABLET ORAL
Qty: 0 | Refills: 0 | DISCHARGE

## 2023-01-17 RX ORDER — LEVOTHYROXINE SODIUM 125 MCG
25 TABLET ORAL DAILY
Refills: 0 | Status: DISCONTINUED | OUTPATIENT
Start: 2023-01-17 | End: 2023-01-19

## 2023-01-17 RX ORDER — MEMANTINE HYDROCHLORIDE 10 MG/1
1 TABLET ORAL
Qty: 0 | Refills: 0 | DISCHARGE

## 2023-01-17 RX ORDER — MORPHINE SULFATE 50 MG/1
2 CAPSULE, EXTENDED RELEASE ORAL ONCE
Refills: 0 | Status: DISCONTINUED | OUTPATIENT
Start: 2023-01-17 | End: 2023-01-17

## 2023-01-17 RX ORDER — DONEPEZIL HYDROCHLORIDE 10 MG/1
10 TABLET, FILM COATED ORAL AT BEDTIME
Refills: 0 | Status: DISCONTINUED | OUTPATIENT
Start: 2023-01-17 | End: 2023-01-19

## 2023-01-17 RX ADMIN — ENOXAPARIN SODIUM 40 MILLIGRAM(S): 100 INJECTION SUBCUTANEOUS at 09:15

## 2023-01-17 RX ADMIN — Medication 25 MICROGRAM(S): at 05:50

## 2023-01-17 RX ADMIN — LIDOCAINE 1 PATCH: 4 CREAM TOPICAL at 09:14

## 2023-01-17 RX ADMIN — TRAMADOL HYDROCHLORIDE 50 MILLIGRAM(S): 50 TABLET ORAL at 18:54

## 2023-01-17 RX ADMIN — MORPHINE SULFATE 2 MILLIGRAM(S): 50 CAPSULE, EXTENDED RELEASE ORAL at 04:39

## 2023-01-17 RX ADMIN — Medication 650 MILLIGRAM(S): at 03:22

## 2023-01-17 RX ADMIN — Medication 5 MILLIGRAM(S): at 09:15

## 2023-01-17 RX ADMIN — TRAMADOL HYDROCHLORIDE 50 MILLIGRAM(S): 50 TABLET ORAL at 22:59

## 2023-01-17 RX ADMIN — POLYETHYLENE GLYCOL 3350 17 GRAM(S): 17 POWDER, FOR SOLUTION ORAL at 09:15

## 2023-01-17 RX ADMIN — Medication 162 MILLIGRAM(S): at 04:39

## 2023-01-17 RX ADMIN — TRAMADOL HYDROCHLORIDE 50 MILLIGRAM(S): 50 TABLET ORAL at 12:37

## 2023-01-17 RX ADMIN — TRAMADOL HYDROCHLORIDE 50 MILLIGRAM(S): 50 TABLET ORAL at 23:30

## 2023-01-17 RX ADMIN — DONEPEZIL HYDROCHLORIDE 10 MILLIGRAM(S): 10 TABLET, FILM COATED ORAL at 21:06

## 2023-01-17 RX ADMIN — Medication 1 MILLIGRAM(S): at 05:38

## 2023-01-17 RX ADMIN — CYCLOBENZAPRINE HYDROCHLORIDE 5 MILLIGRAM(S): 10 TABLET, FILM COATED ORAL at 19:32

## 2023-01-17 RX ADMIN — MEMANTINE HYDROCHLORIDE 10 MILLIGRAM(S): 10 TABLET ORAL at 09:15

## 2023-01-17 RX ADMIN — ESCITALOPRAM OXALATE 10 MILLIGRAM(S): 10 TABLET, FILM COATED ORAL at 09:15

## 2023-01-17 RX ADMIN — Medication 162 MILLIGRAM(S): at 03:22

## 2023-01-17 RX ADMIN — Medication 3 MILLIGRAM(S): at 21:06

## 2023-01-17 RX ADMIN — Medication 5 MILLIGRAM(S): at 21:06

## 2023-01-17 NOTE — ED PROVIDER NOTE - PROGRESS NOTE DETAILS
Pebbles CHACON: X-ray of pelvis noted to be positive for left pelvis fracture.  Patient with known left-sided pelvis fracture.  No prior images here at [facility].  Likely old fracture that patient is already been diagnosed with.  Also noted to have elevated troponin with normal EKG.  When asked with Sami-speaking nurse patient states she does not have any chest pain.  She was asked on multiple occasions throughout her stay in the emergency department.  Aspirin given along with small dose of morphine for c/o left hip/pelvis pain.  Signout given to hospitalist who is recommending admission to observation.  AMOR.

## 2023-01-17 NOTE — H&P ADULT - HISTORY OF PRESENT ILLNESS
86 y/o F with PMH left pubic fracture, Alzheimer's disease, HLD, hypothyroidism, anxiety disorder, constipation, urinary incontinence, GERD presented from [facility] with hitting her head. Documentation from [facility] states that the pt pushed back in a chair and the head of the chair came into contact with the radiator in the dining room. She was holding the back of her head after the episode. No syncope. Additionally, she was complaining of chest discomfort and her BP was elevated to the 160's. Upon my evaluation pt is A+Ox1 and unable to provide additional history. She denied chest pain on multiple occasions but did complain of left hip pain. I left a voicemail for her daughter and am awaiting a call back.     ER course: /92. Labs: Hb 11.1 (baseline unknown), glucose 115, AST 46, troponin 117.51. EKG: NSR with HR 98 bpm,  ms, no ST segment changes, no T wave inversions, no prior EKGs to compare to (personally reviewed).     Imaging:   - XR hips/pelvis: left pubic rami fracture, no femur fracture b/l (personally reviewed). - CXR: no consolidation, no effusion, no pneumothorax (personally reviewed).   - CT cervical spine:  No evidence of acute osseous injury.  - CT head: No acute intracranial hemorrhage, mass effect, or evidence of acute vascular territorial infarction. If clinical symptoms persist or worsen, more sensitive evaluation with brain MRI may be obtained, if no contraindications exist.    Pt was given a total of 325 mg of ASA, tylenol, morphine. She is being placed on telemetry observation for further management.  86 y/o F with PMH left pubic fracture, Alzheimer's disease (A+Ox1 at baseline per daughter with waxing/waning confusion), HLD, hypothyroidism, anxiety disorder, constipation, urinary incontinence, GERD presented from [facility] with hitting her head. Documentation from [facility] states that the pt pushed back in a chair and the head of the chair came into contact with the radiator in the dining room. She was holding the back of her head after the episode. No syncope. Additionally, she was complaining of chest discomfort and her BP was elevated to the 160's. Upon my evaluation pt is A+Ox1 and unable to provide additional history. She denied chest pain on multiple occasions but did complain of left hip pain. I left a voicemail for her daughter and am awaiting a call back.     ER course: /92. Labs: Hb 11.1 (baseline unknown), glucose 115, AST 46, troponin 117.51. EKG: NSR with HR 98 bpm,  ms, no ST segment changes, no T wave inversions, no prior EKGs to compare to (personally reviewed).     Imaging:   - XR hips/pelvis: left pubic rami fracture, no femur fracture b/l (personally reviewed). - CXR: no consolidation, no effusion, no pneumothorax (personally reviewed).   - CT cervical spine:  No evidence of acute osseous injury.  - CT head: No acute intracranial hemorrhage, mass effect, or evidence of acute vascular territorial infarction. If clinical symptoms persist or worsen, more sensitive evaluation with brain MRI may be obtained, if no contraindications exist.    Pt was given a total of 325 mg of ASA, tylenol, morphine. She is being placed on telemetry observation for further management.  bed rails

## 2023-01-17 NOTE — ED PROVIDER NOTE - PHYSICAL EXAMINATION
***GEN - Mildly agitated, patient speaking to herself incessantly; overall well appearing; A+O x1 ***HEAD - NC/AT ***EYES/NOSE - PERRL, EOMI, mucous membranes moist, no discharge ***THROAT: Oral cavity and pharynx normal. No inflammation, swelling, exudate, or lesions.  ***NECK: Neck supple  ***PULMONARY - CTA b/l, symmetric breath sounds. ***CARDIAC -s1s2, RRR, no M,G,R  ***ABDOMEN - +BS, ND, NT, soft, no guarding, no rebound, no masses   ***BACK - no CVA tenderness, Normal  spine ***EXTREMITIES/MSK - +ttp over left hip/pelvis area, symmetric pulses, 2+ dp, capillary refill < 2 seconds, no clubbing, no cyanosis, no edema ***SKIN - no rash or bruising   ***NEUROLOGIC - alert, CN 2-12 intact

## 2023-01-17 NOTE — H&P ADULT - ASSESSMENT
88 y/o F presented with hitting her head and chest pain     1. Elevated troponin likely secondary to demand ischemia (r/o NSTEMI)   - Telemetry observation   - EKG: no ST segment changes or T wave inversions   - Troponin 117.51, will trend 2 more troponins   - s/p 325 mg of aspirin in the ER, c/w 81 mg of aspirin daily   - Ordered BNP, lipid panel  - Zofran and Maalox for nausea, Nitroglycerin PRN for chest pain   - If troponins continue to trend upwards, will order heparin and place pt NPO   - Cardiology consult - Dr. Pathak    2. Head injury   - CT head and neck negative for acute pathology   - No reports of syncope     3. Hypertension   - /92, monitor   - Pain control   - Hydralazine PRN for SBP > 160     4. Left pubic rami fracture   - Pain control   - Lidocaine patch   - Flexeril PRN   - PT evaluation   - Orthopedics consult     5. Normocytic anemia   - Hb 11.1 (baseline unknown), monitor closely     6. Hyperglycemia   - Glucose 115, monitor     7. Elevated AST   - AST 46, trend     8. History of left pubic fracture, Alzheimer's disease, HLD, hypothyroidism, anxiety disorder, constipation, urinary incontinence, GERD  - c/w home medications; verified with facility list     DVT ppx: Lovenox 40 mg subcutaneous daily   Code status: The patient is A+Ox0 at the time of my evaluation and does not have full capacity to make an informed decision or good understanding of the risks associated with the decision being made. I left a voicemail for the pt's emergency contact/daughter and am awaiting call back. She joanna remain a full code overnight, please discuss goals of care further with the pt's daughter in the morning.   Emergncy contact: Elena (daughter) [phone]  88 y/o F presented with hitting her head and chest pain     1. Elevated troponin likely secondary to demand ischemia (r/o NSTEMI)   - Telemetry observation   - EKG: no ST segment changes or T wave inversions   - Troponin 117.51, will trend 2 more troponins   - s/p 325 mg of aspirin in the ER, c/w 81 mg of aspirin daily   - Ordered BNP, lipid panel  - Zofran and Maalox for nausea, Nitroglycerin PRN for chest pain   - If troponins continue to trend upwards, will order heparin and place pt NPO   - Cardiology consult - Dr. Pathak    2. Head injury   - CT head and neck negative for acute pathology   - No reports of syncope     3. Hypertension   - /92, monitor   - Pain control   - Hydralazine PRN for SBP > 160     4. Left pubic rami fracture   - Pain control   - Lidocaine patch   - Flexeril PRN   - PT evaluation   - Orthopedics consult     5. Normocytic anemia   - Hb 11.1 (baseline unknown), monitor closely     6. Hyperglycemia   - Glucose 115, monitor     7. Elevated AST   - AST 46, trend     8. History of left pubic fracture, Alzheimer's disease, HLD, hypothyroidism, anxiety disorder, constipation, urinary incontinence, GERD  - c/w home medications; verified with facility list     DVT ppx: Lovenox 40 mg subcutaneous daily   Code status: The patient is A+Ox0 at the time of my evaluation and does not have full capacity to make an informed decision or good understanding of the risks associated with the decision being made. I left a voicemail for the pt's emergency contact/daughter and am awaiting call back. She will remain a full code overnight, please discuss goals of care further with the pt's daughter in the morning.   Emergency contact: Elena (daughter) [phone]     I spent a total of 80 minutes on the date of this encounter coordinating the patient's care. This includes reviewing prior documentation, results and imaging in addition to completing a full history and physical examination on the patient. Further tests, medications, and procedures have been ordered as indicated. Laboratory results and the plan of care were communicated to the patient and/or their family member. Supporting documentation was completed and added to the patient's chart.

## 2023-01-17 NOTE — PHYSICAL THERAPY INITIAL EVALUATION ADULT - DIAGNOSIS, PT EVAL
head strike on radiator,+ posterior contusion , recent traumatic L inf/sup pubic rami fxs /fall outdoors 1/11/23, dementia head strike on radiator,+ posterior contusion , recent traumatic comminuted displaced L inf/sup pubic rami fxs & L hemisacral fx  /fall outdoors 1/11/23, dementia, transient chest pain/demand ischemia

## 2023-01-17 NOTE — H&P ADULT - NSHPPHYSICALEXAM_GEN_ALL_CORE
ICU Vital Signs Last 24 Hrs  T(C): 36.9 (16 Jan 2023 22:50), Max: 36.9 (16 Jan 2023 22:50)  T(F): 98.5 (16 Jan 2023 22:50), Max: 98.5 (16 Jan 2023 22:50)  HR: 93 (16 Jan 2023 22:50) (93 - 93)  BP: 159/92 (16 Jan 2023 22:50) (159/92 - 159/92)  RR: 18 (16 Jan 2023 22:50) (18 - 18)  SpO2: 99% (16 Jan 2023 22:50) (99% - 99%)    O2 Parameters below as of 16 Jan 2023 22:50  Patient On (Oxygen Delivery Method): room air    General: Awake and alert, cooperative with exam. No acute distress.   Skin: Warm, dry, and pink. Ecchymosis of left medial thigh.   Eyes: Pupils equal and reactive to light. Extraocular eye movements intact. No conjunctival injection, discharge, or scleral icterus.   HEENT: Atraumatic, normocephalic. Moist mucus membranes.   Cardiology: Normal S1, S2. No murmurs, rubs, or gallops. Regular rate and rhythm.   Respiratory: Lungs clear to ascultation bilaterally. Good air exchange. No wheezes, rales, or rhonchi. Normal chest expansion.   Gastrointestinal: Positive bowel sounds. Soft, non-tender, non-distended. No guarding, rigidity, or rebound tenderness. No hepatosplenomegaly.   Musculoskeletal: 5/5 motor strength in all extremities. Normal range of motion.   Extremities: No peripheral edema bilaterally. Dorsalis pedis pulses 2+ bilaterally.   Neurological: A+Ox1 (person, place, and time). Does not follow commands appropriately. No facial droop. No slurred speech.   Psychiatric: Agitated.

## 2023-01-17 NOTE — PATIENT PROFILE ADULT - FUNCTIONAL ASSESSMENT - BASIC MOBILITY 6.
2-calculated by average/Not able to assess (calculate score using [facility] averaging method)

## 2023-01-17 NOTE — H&P ADULT - NSICDXPASTMEDICALHX_GEN_ALL_CORE_FT
PAST MEDICAL HISTORY:  Anxiety     Dementia     GERD (gastroesophageal reflux disease)     H/O constipation     H/O urinary incontinence     HLD (hyperlipidemia)     Hypothyroidism     Pelvic fracture

## 2023-01-17 NOTE — PHYSICAL THERAPY INITIAL EVALUATION ADULT - MARITAL STATUS
pt resides in [place], was attending adult day care at [facility] 3x/week prior to fall/

## 2023-01-17 NOTE — PATIENT PROFILE ADULT - FALL HARM RISK - HARM RISK INTERVENTIONS
Assistance with ambulation/Assistance OOB with selected safe patient handling equipment/Communicate Risk of Fall with Harm to all staff/Discuss with provider need for PT consult/Monitor for mental status changes/Monitor gait and stability/Move patient closer to nurses' station/Provide patient with walking aids - walker, cane, crutches/Reinforce activity limits and safety measures with patient and family/Reorient to person, place and time as needed/Tailored Fall Risk Interventions/Toileting schedule using arm’s reach rule for commode and bathroom/Use of alarms - bed, chair and/or voice tab/Visual Cue: Yellow wristband and red socks/Bed in lowest position, wheels locked, appropriate side rails in place/Call bell, personal items and telephone in reach/Instruct patient to call for assistance before getting out of bed or chair/Non-slip footwear when patient is out of bed/[place] to call system/Physically safe environment - no spills, clutter or unnecessary equipment/Purposeful Proactive Rounding/Room/bathroom lighting operational, light cord in reach

## 2023-01-17 NOTE — CONSULT NOTE ADULT - CONVERSATION DETAILS
We discussed Palliative Care team being a supportive team when a patient has ongoing illnesses.  We also discussed that it is not an end of life care service, but can help navigate symptoms and emotional support throughout their hospital stay here.       Patient is described as lively and loves to dance and very active.   Needs assistance w/ chosing her clothing but typically could dress herself.  Lives home w/ 24/7 aide and goes to [facility]  3 xs a week.     Pt's PCP is Dr. Conti , pts daughter is a pharmacist and her son is a retired [employer] officer.      We discussed at length patients underlying clinical diagnosis at length.  We discussed resuscitation and risk and benefits of CPR. Risks include, broken ribs, lung puncture, pain and discomfort.   At this time due to patients underlying irreversible diagnosis of dementia which is progressive disease I would not recommend CPR because it would not reverse current medical condition.  They understand that DNR means NO CPR and that would allow natural death.  No CPR means no attempt to restart the heart once it has stopped.  Daughter verbalized understanding.     We also discussed mechanical ventilation in  an event that they could no longer breath on their own.  Risk and benefits of mechanical ventilation were discussed at length.     Daughter was able to verbalized understanding.       She has been discussing this with her brother.   We reviewed MOLST form and its utility and she will review living will and bring copy for us as well.

## 2023-01-17 NOTE — PHYSICAL THERAPY INITIAL EVALUATION ADULT - PATIENT/FAMILY/SIGNIFICANT OTHER GOALS STATEMENT, PT EVAL
daughter reports pt develops sharp pains & spasms when pain meds wear off , concerned she has not been on her feet since Saturday

## 2023-01-17 NOTE — CONSULT NOTE ADULT - SUBJECTIVE AND OBJECTIVE BOX
Pt is an 87F with dementia here after a mechanical fall last wednesday. Pt is in an independent ambulator who lives with her daughter, Elena at home, where she had a mechanical fall on wednesday while outside, fall was witnessed negative headstrike, loss of consciousness. Pt was taken to [facility] where she was discharged last Friday to [facility], where pt already visits three times a week for Alzheimer's care, for PT and rehab. At [facility] pt struck her head on a radiator and developed neck pain, for which she was brought in by EMS to [facility]. Here in the ER she was found to have elevated trops and was admitted to medicine, to be seen by cardiology on the telemetry floor. Ortho was consulted for the prior pubic rami fx pt was seen for at [facility] for further recs. Pt has dementia therefore ROS unable to be obtained.    T(C): 37.1 (01-17-23 @ 08:51), Max: 37.1 (01-17-23 @ 08:51)  T(F): 98.7 (01-17-23 @ 08:51), Max: 98.7 (01-17-23 @ 08:51)  HR: 91 (01-17-23 @ 08:51) (91 - 100)  BP: 128/67 (01-17-23 @ 08:51) (128/67 - 159/92)  RR: 18 (01-17-23 @ 08:51) (18 - 18)  SpO2: 95% (01-17-23 @ 08:51) (94% - 99%)    Exam:    LLE:  Skin: No erythema, edema or gross lesions noted  NTTP over pubic rami, rest of extremity  SILT L2-S1  Motor: +EHL/FHL/TA/GSc  Compartments soft and compressible  Calves NTTP B/l  2+ DP    Secondary Survey:   RLE/RUE/LUE: No TTP over bony prominences, SILT, palpable pulses, full/painless range of motion, compartments soft    Spine: No bony tenderness. No palpable stepoffs.                          10.3   6.92  )-----------( 276      ( 17 Jan 2023 06:48 )             30.8   01-17    138  |  99  |  15  ----------------------------<  115<H>  3.9   |  29  |  0.80    Ca    8.8      17 Jan 2023 03:31    TPro  7.6  /  Alb  3.3  /  TBili  0.7  /  DBili  x   /  AST  46<H>  /  ALT  44  /  AlkPhos  85  01-17    Imaging:    Radiographs of pelvis, L hip reviewed demonstrating a superior and inferior pubic rami fx    A/p:    Pt is an 87F with dementia s/p MF last week with a superior and inferior pubic rami fx.    *Incomplete note    -WBAT LLE/PT/OT if no further findings on CT  -Pain regimen PRN  -Dispo per PT  -Medical management appreciated  -DVT ppx: per primary team  -Plan discussed w daughter Elena, who is in agreement w the above  -Plan discussed w attending, who is in agreement w the above Pt is an 87F with dementia here after a mechanical fall last wednesday. Pt is in an independent ambulator who lives with her daughter, Elena at home, where she had a mechanical fall on wednesday while outside, fall was witnessed negative headstrike, loss of consciousness. Pt was taken to [facility] where she was discharged last Friday to [facility], where pt already visits three times a week for Alzheimer's care, for PT and rehab. At [facility] pt struck her head on a radiator and developed neck pain, for which she was brought in by EMS to [facility]. Here in the ER she was found to have elevated trops and was admitted to medicine, to be seen by cardiology on the telemetry floor. Ortho was consulted for the prior pubic rami fx pt was seen for at [facility] for further recs. Pt has dementia therefore ROS unable to be obtained.    T(C): 37.1 (01-17-23 @ 08:51), Max: 37.1 (01-17-23 @ 08:51)  T(F): 98.7 (01-17-23 @ 08:51), Max: 98.7 (01-17-23 @ 08:51)  HR: 91 (01-17-23 @ 08:51) (91 - 100)  BP: 128/67 (01-17-23 @ 08:51) (128/67 - 159/92)  RR: 18 (01-17-23 @ 08:51) (18 - 18)  SpO2: 95% (01-17-23 @ 08:51) (94% - 99%)    Exam:    LLE:  Skin: No erythema, edema or gross lesions noted  NTTP over pubic rami, rest of extremity  SILT L2-S1  Motor: +EHL/FHL/TA/GSc  Compartments soft and compressible  Calves NTTP B/l  2+ DP    Secondary Survey:   RLE/RUE/LUE: No TTP over bony prominences, SILT, palpable pulses, full/painless range of motion, compartments soft    Spine: No bony tenderness. No palpable stepoffs.                          10.3   6.92  )-----------( 276      ( 17 Jan 2023 06:48 )             30.8   01-17    138  |  99  |  15  ----------------------------<  115<H>  3.9   |  29  |  0.80    Ca    8.8      17 Jan 2023 03:31    TPro  7.6  /  Alb  3.3  /  TBili  0.7  /  DBili  x   /  AST  46<H>  /  ALT  44  /  AlkPhos  85  01-17    Imaging:    Radiographs of pelvis, L hip reviewed demonstrating a superior and inferior pubic rami fx  CT scan of pelvis reviewed revealing additional L hemisacrum Fx in addition to sup/inf pubic rami fx    A/p:    Pt is an 87F with dementia s/p MF last week with a superior and inferior pubic rami fx.    -PWB 50% LLE/PT/OT  -Pain regimen PRN  -Dispo per PT  -Medical management appreciated  -DVT ppx: per primary team  -Plan discussed w daughter Elena, who is in agreement w the above  -Plan discussed w attending, who is in agreement w the above Pt is an 87F with dementia here after a mechanical fall last wednesday. Pt is in an independent ambulator who lives with her daughter, Elena at home, where she had a mechanical fall on wednesday while outside, fall was witnessed negative headstrike, loss of consciousness. Pt was taken to [facility] where she was discharged last Friday to [facility], where pt already visits three times a week for Alzheimer's care, for PT and rehab. At [facility] pt struck her head on a radiator and developed neck pain, for which she was brought in by EMS to [facility]. Here in the ER she was found to have elevated trops and was admitted to medicine, to be seen by cardiology on the telemetry floor. Ortho was consulted for the prior pubic rami fx pt was seen for at [facility] for further recs. Pt has dementia therefore ROS unable to be obtained.    T(C): 37.1 (01-17-23 @ 08:51), Max: 37.1 (01-17-23 @ 08:51)  T(F): 98.7 (01-17-23 @ 08:51), Max: 98.7 (01-17-23 @ 08:51)  HR: 91 (01-17-23 @ 08:51) (91 - 100)  BP: 128/67 (01-17-23 @ 08:51) (128/67 - 159/92)  RR: 18 (01-17-23 @ 08:51) (18 - 18)  SpO2: 95% (01-17-23 @ 08:51) (94% - 99%)    Exam:    LLE:  Skin: No erythema, edema or gross lesions noted  NTTP over pubic rami, rest of extremity  SILT L2-S1  Motor: +EHL/FHL/TA/GSc  Compartments soft and compressible  Calves NTTP B/l  2+ DP    Secondary Survey:   RLE/RUE/LUE: No TTP over bony prominences, SILT, palpable pulses, full/painless range of motion, compartments soft    Spine: No bony tenderness. No palpable stepoffs.                          10.3   6.92  )-----------( 276      ( 17 Jan 2023 06:48 )             30.8   01-17    138  |  99  |  15  ----------------------------<  115<H>  3.9   |  29  |  0.80    Ca    8.8      17 Jan 2023 03:31    TPro  7.6  /  Alb  3.3  /  TBili  0.7  /  DBili  x   /  AST  46<H>  /  ALT  44  /  AlkPhos  85  01-17    Imaging:    Radiographs of pelvis, L hip reviewed demonstrating a superior and inferior pubic rami fx  CT scan of pelvis reviewed revealing additional L hemisacrum Fx in addition to sup/inf pubic rami fx    A/p:    Pt is an 87F with dementia s/p MF last week with a superior and inferior pubic rami fx.    -PWB 50% LLE/PT/OT  -Pain regimen PRN  -No operative plan at this time  -Dispo per PT  -Medical management appreciated  -DVT ppx: per primary team  -Follow up outpatient with Dr. Ford in 2-3 weeks  -Plan discussed w daughter lEena, who is in agreement w the above  -Plan discussed w attending, who is in agreement w the above

## 2023-01-17 NOTE — CONSULT NOTE ADULT - SUBJECTIVE AND OBJECTIVE BOX
HPI:     86 y/o F with PMH left pubic fracture, Alzheimer's disease (A+Ox1 at baseline per daughter with waxing/waning confusion), HLD, hypothyroidism, anxiety disorder, constipation, urinary incontinence, GERD presented from [facility] with hitting her head. Documentation from [facility] states that the pt pushed back in a chair and the head of the chair came into contact with the radiator in the dining room. She was holding the back of her head after the episode.      Patient was marcelle and examined bedside this morning  confused , mumbling responses  I spoke to her in Cambodian directly but could not answer questions entirely appropriately  She did say no to nausea and vomiting and when it came to pain couldn't say yes or no.          PAIN: ( )Yes   x( )No  Pt unable to characterise d/t clinical status     DYSPNEA: ( ) Yes  (x ) No  Level:    PAST MEDICAL & SURGICAL HISTORY:  Dementia      HLD (hyperlipidemia)      Pelvic fracture      Hypothyroidism      Anxiety      H/O constipation      H/O urinary incontinence      GERD (gastroesophageal reflux disease)      No significant past surgical history          SOCIAL HX:    Hx opiate tolerance ( )YES  ( x)NO    Baseline ADLs  (Prior to Admission)  ( ) Independent   ( )Dependent    FAMILY HISTORY:  Patient unable to provide medical history (Father, Mother)        Review of Systems:     Unable to obtain/Limited due to: altered, confusion   patient typically is A+Ox 1-2      PHYSICAL EXAM:    Vital Signs Last 24 Hrs  T(C): 36.9 (2023 06:10), Max: 36.9 (2023 22:50)  T(F): 98.4 (2023 06:10), Max: 98.5 (2023 22:50)  HR: 100 (2023 06:10) (93 - 100)  BP: 151/76 (2023 06:10) (151/76 - 159/92)  BP(mean): --  RR: 18 (2023 06:10) (18 - 18)  SpO2: 94% (2023 06:10) (94% - 99%)    Parameters below as of 2023 06:10  Patient On (Oxygen Delivery Method): room air      Daily Height in cm: 162.56 (2023 22:50)    Daily Weight in k.4 (2023 06:10)    PPSV2:  40 %  FAST:  5 (needs help selecting attire)      General: calm in NAD  Mental Status: arouses to name  HEENT: eomi, perrl  Lungs: ctabl b/l bs  Cardiac: s1s2 no mgr  GI: soft nontender +BS  : voids  Ext: no edema or erythema  Neuro: confusion, +delirium deviation from baseline     LABS:                        10.3   6.92  )-----------( 276      ( 2023 06:48 )             30.8         138  |  99  |  15  ----------------------------<  115<H>  3.9   |  29  |  0.80    Ca    8.8      2023 03:31    TPro  7.6  /  Alb  3.3  /  TBili  0.7  /  DBili  x   /  AST  46<H>  /  ALT  44  /  AlkPhos  85        Albumin: Albumin, Serum: 3.3 g/dL ( @ 03:31)      Allergies    No Known Allergies    Intolerances      MEDICATIONS  (STANDING):  donepezil 10 milliGRAM(s) Oral at bedtime  enoxaparin Injectable 40 milliGRAM(s) SubCutaneous every 24 hours  escitalopram 10 milliGRAM(s) Oral daily  levothyroxine 25 MICROGram(s) Oral daily  lidocaine   4% Patch 1 Patch Transdermal daily  memantine 10 milliGRAM(s) Oral daily  naloxone Injectable 0.4 milliGRAM(s) IV Push once  nitroglycerin     SubLingual 0.4 milliGRAM(s) SubLingual every 5 minutes  oxybutynin 5 milliGRAM(s) Oral two times a day  pantoprazole    Tablet 40 milliGRAM(s) Oral before breakfast  polyethylene glycol 3350 17 Gram(s) Oral daily    MEDICATIONS  (PRN):  acetaminophen     Tablet .. 975 milliGRAM(s) Oral every 8 hours PRN Temp greater or equal to 38C (100.4F), Moderate Pain (4 - 6)  aluminum hydroxide/magnesium hydroxide/simethicone Suspension 30 milliLiter(s) Oral every 4 hours PRN Dyspepsia  cyclobenzaprine 5 milliGRAM(s) Oral every 8 hours PRN Spasm  hydrALAZINE Injectable 10 milliGRAM(s) IV Push every 6 hours PRN SBP > 160  melatonin 3 milliGRAM(s) Oral at bedtime PRN Insomnia  morphine  - Injectable 2 milliGRAM(s) IV Push every 4 hours PRN Moderate Pain (4 - 6)  ondansetron Injectable 4 milliGRAM(s) IV Push every 8 hours PRN Nausea and/or Vomiting  traMADol 50 milliGRAM(s) Oral every 4 hours PRN Moderate Pain (4 - 6)        RADIOLOGY/ADDITIONAL ST

## 2023-01-17 NOTE — CHART NOTE - NSCHARTNOTEFT_GEN_A_CORE
HPI:  88 y/o F with PMH left pubic fracture, Alzheimer's disease (A+Ox1 at baseline per daughter with waxing/waning confusion), HLD, hypothyroidism, anxiety disorder, constipation, urinary incontinence, [facility] back in a chair and the head of the chair came into contact with the radiator in the dining room. She was holding the back of her head after the episode. No syncope. Additionally, she was complaining of chest discomfort and her BP was elevated to the 160's. Upon my evaluation pt is A+Ox1 and unable to provide additional history. She denied chest pain on multiple occasions but did complain of left hip pain.    (17 Jan 2023 05:09)      PERTINENT PMH REVIEWED:  [ X] YES [ ] NO           Primary Contact:  roddy Parker, phone #[phone]    HCP [  ] Surrogate [   ] Guardian [   ] -family located HCP    Mental Status: Pt. lacks capacity.  Concerns of Depression [  ] -not identified.  Anxiety [   ] -not identified.  Baseline ADLs (prior to admission):  Independent [ ] moderately [ ] fully   Dependent   [X ] moderately [ ]fully    Family Meeting attendees: GOC held with roddy Parker and Dr. Daily on 1/17.    Anticipated Grief: Patient[  ] Family [ X ]    Caregiver [place] Assessed: Yes [ X ] No [  ]    Scientologist: Unknown.    Spiritual Concerns: Not identified,  available for support.    Goals of Care: To be further discussed.    Previous Services: 24/7 aide services, [facility]    ADVANCE DIRECTIVES:    -Pt. lacks capacity  -Roddy Parker is looking for HCP, LW and POA   -Full Code    Anticipated D/C Plan: To be determined.                     Summary:  This SW spoke with daughterElena via telephone with Dr. Daliy this date to discuss GOC, assist with planning and provide supportive counseling.  Palliative SW role explained.  Emotional support provided.  Pt. lacks capacity.  Pt. resides at home with aide services, has 24/7 assistance.  Aide set up with the Nursing home diversion waiver program.  [facility]  Daughters feelings explored.  Support provided.    GOC held this date.  We discussed CPR vs DNR/DNI.  Daughter searching for HCP, LW and POA this date and will be discussing CPR vs DNR/DNI with her brother.  Pt remains a Full Code at this time.    Plan to be determined.  Family to discuss Pts wishes and search for advance directives.  Emotional support provided.  Our team to continue to follow.

## 2023-01-17 NOTE — CONSULT NOTE ADULT - RESPIRATORY DISTRESS OBSERVATION: LOOK OF FEAR
[FreeTextEntry1] : 41 M who is here for initial visit with me for symptoms suggestive of depression. He was offered cymbalta. he didn't want to start that at this time. Review of his home and work life didn't reveal any causes. He will f/u prn.\par \par Patient was advised to continue to monitor for neurologic symptoms and to notify my office or go to the nearest emergency room if there are any changes. Neurologic issues were discussed with the patient. All questions were answered to complete satisfaction. Education was provided to the patient during this encounter.\par Side effects of the above medications were discussed in detail including but not limited to applicable black box warning and teratogenicity as appropriate. \par Patient was advised to bring previous records to my office. \par \par 
None

## 2023-01-17 NOTE — PROGRESS NOTE ADULT - SUBJECTIVE AND OBJECTIVE BOX
HOSPITALIST ATTENDING PROGRESS NOTE    Chart and meds reviewed.  Patient seen and examined.    CC: CP    Subjective: Hx obtained mostly from daughter. No current CP, with significant hip, leg pain. Reports pain not well controlled at [facility] after hospitalization for fx.     All other systems reviewed and found to be negative with the exception of what has been described above.    MEDICATIONS  (STANDING):  donepezil 10 milliGRAM(s) Oral at bedtime  enoxaparin Injectable 40 milliGRAM(s) SubCutaneous every 24 hours  escitalopram 10 milliGRAM(s) Oral daily  levothyroxine 25 MICROGram(s) Oral daily  lidocaine   4% Patch 1 Patch Transdermal daily  memantine 10 milliGRAM(s) Oral daily  naloxone Injectable 0.4 milliGRAM(s) IV Push once  nitroglycerin     SubLingual 0.4 milliGRAM(s) SubLingual every 5 minutes  oxybutynin 5 milliGRAM(s) Oral two times a day  pantoprazole    Tablet 40 milliGRAM(s) Oral before breakfast  polyethylene glycol 3350 17 Gram(s) Oral daily    MEDICATIONS  (PRN):  acetaminophen     Tablet .. 975 milliGRAM(s) Oral every 8 hours PRN Temp greater or equal to 38C (100.4F), Moderate Pain (4 - 6)  aluminum hydroxide/magnesium hydroxide/simethicone Suspension 30 milliLiter(s) Oral every 4 hours PRN Dyspepsia  cyclobenzaprine 5 milliGRAM(s) Oral every 8 hours PRN Spasm  melatonin 3 milliGRAM(s) Oral at bedtime PRN Insomnia  ondansetron Injectable 4 milliGRAM(s) IV Push every 8 hours PRN Nausea and/or Vomiting  traMADol 50 milliGRAM(s) Oral every 4 hours PRN Moderate Pain (4 - 6)      VITALS:  T(F): 98.1 (01-17-23 @ 19:40), Max: 98.7 (01-17-23 @ 08:51)  HR: 97 (01-17-23 @ 19:40) (91 - 100)  BP: 137/60 (01-17-23 @ 19:40) (128/67 - 159/92)  RR: 18 (01-17-23 @ 19:40) (18 - 18)  SpO2: 96% (01-17-23 @ 19:40) (94% - 99%)  Wt(kg): --    I&O's Summary      CAPILLARY BLOOD GLUCOSE          PHYSICAL EXAM:  Gen: NAD  HEENT:  pupils equal and reactive, EOMI, no oropharyngeal lesions, erythema, exudates, oral thrush  NECK:   supple, no carotid bruits, no palpable lymph nodes, no thyromegaly  CV:  +S1, +S2, regular, no murmurs or rubs  RESP:   lungs clear to auscultation bilaterally, no wheezing, rales, rhonchi, good air entry bilaterally  BREAST:  not examined  GI:  abdomen soft, non-tender, non-distended, normal BS, no bruits, no abdominal masses, no palpable masses  RECTAL:  not examined  :  not examined  MSK:   normal muscle tone, no atrophy, no rigidity, no contractions  EXT:  no clubbing, no cyanosis, no edema, no calf pain, swelling or erythema  VASCULAR:  pulses equal and symmetric in the upper and lower extremities  NEURO:  AAOX3, no focal neurological deficits, follows all commands, able to move extremities spontaneously  SKIN:  no ulcers, lesions or rashes    LABS:                            10.3   6.92  )-----------( 276      ( 17 Jan 2023 06:48 )             30.8     01-17    138  |  101  |  14  ----------------------------<  108<H>  3.9   |  30  |  0.81    Ca    8.8      17 Jan 2023 06:48    TPro  7.0  /  Alb  3.1<L>  /  TBili  0.5  /  DBili  x   /  AST  44<H>  /  ALT  43  /  AlkPhos  78  01-17        LIVER FUNCTIONS - ( 17 Jan 2023 06:48 )  Alb: 3.1 g/dL / Pro: 7.0 gm/dL / ALK PHOS: 78 U/L / ALT: 43 U/L / AST: 44 U/L / GGT: x           CULTURES:  no new    Additional results/Imaging, I have personally reviewed:  CTH 1/16/23: No acute intracranial hemorrhage, mass effect, or evidence of acute vascular territorial infarction. If clinical symptoms persist or worsen, more sensitive evaluation with brain MRI may be obtained, if no contraindications exist.    CT cspine 1/17/23: No evidence of acute osseous injury.    L hip, pelvis xray 1/17/23: Fractures around the low left pelvis with some displacement. CAT scan may be advisable.    L hip, pelvis xray 1/17/23: 1. Slightly comminuted and displaced superior and inferior pubic rami fractures. 2. Minor degenerative changes of both hips with no fracture on either side.    CT bony pelvis 1/17/23: Acute comminuted displaced fractures of left superior and inferior pubic rami. Acute comminuted fracture of the left hemisacrum.    L femur xray 1/17/23: 1. No acute fracture or dislocation is seen involving the left femur with only mild degenerative arthropathy of the left hip and left knee. 2. Left pubic ring fracture discussed under separate cover.    Echo 1/17/23: The mitral valve leaflets appear thickened. EA reversal of the mitral inflow consistent with reduced compliance of the left ventricle. Moderate (2+) mitral regurgitation is present. The aortic valve is well visualized, appears calcified. Valve opening seems to be normal. Mild to Moderate aortic regurgitation is present. Normal appearing tricuspid valve structure. Mild to Moderate Tricuspid regurgitation is present. Mild pulmonary hypertension. Pulmonic valve not well seen. No pulmonic valvular regurgitation is seen. Normal appearing left atrium. The left ventricle is normal in size, wall thickness, wall motion and contractility. Estimated left ventricular ejection fraction is 55-60 %. Normal appearing right atrium. Normal appearing right ventricle structure and function.    Telemetry, personally reviewed:  1/17/23: sinus

## 2023-01-17 NOTE — PHYSICAL THERAPY INITIAL EVALUATION ADULT - LEVEL OF INDEPENDENCE: SUPINE/SIT, REHAB EVAL
deferred out of bed on this encounter due to pain/spasms/guarding taking patients breath away after performing ROM ex's

## 2023-01-17 NOTE — PHYSICAL THERAPY INITIAL EVALUATION ADULT - WORK/LEISURE ACTIVITY, REHAB EVAL
[facility] normally very active for age & attended [facility]/independent

## 2023-01-17 NOTE — CONSULT NOTE ADULT - ASSESSMENT
Process of Care  --Reviewed dx/treatment problems and alignment with Goals of Care    Physical Aspects of Care  --Pain  pt pain wasn't well controlled in past until Tramdol started OP.     Tylenol 1000mg TID  Tramadol 50mg [id number] prn for moderate pain  Lidocaine patch Qdaily     Pt is elderly w/ dementia, would be cautious and start at lower doses especialyl if she's responding well to them.   DC'd Tramadol 75mg and Morphine 4mg IV       --Bowel Regimen   risk for constipation d/t immobility  daily Miralax  if no BM x 2 days add Dulcolax     --Dyspnea  No SOB at this time  comfortable and in NAD    --Nausea Vomiting  denies    --Weakness  PT eval     --Delirium   Recommendations:   -Initiate melatonin 5mg qhs, to promote maintenance of circadian rhythm/restful sleep, and for ppx of future susceptibility to delirium upon resolution of presenting condition.  -Avoid deliriogenic agents including BZD (Chart reviewed, ativan 1mg given 1x.  Avoid if possible, and if deeemed necessary would reccomend starting lower ~0,25mg d/t age and frailty) , continue to avoid anticholinergics, and sedating agents if possible  -Re-orient frequently, encourage family at bedside as able.   -Early mobilization recommended if feasible.     Psychological and Psychiatric Aspects of Care:   --Greif/Bereavment: emotional support provided    --Hx of psychiatric dx: none  -Pastoral Care Available PRN     Social Aspects of Care  -SW involved     Cultural Aspects  -Primary Language: English    Goals of Care:     We discussed Palliative Care team being a supportive team when a patient has ongoing illnesses.  We also discussed that it is not an end of life care service, but can help navigate symptoms and emotional support througout their hospital stay here.     Please refer to GoC above      Prognosis: Death can occur at anytime, but if disease continues to progress naturally patient likely has days to weeks.    Ethical and Legal Aspects:   NA        Capacity: w/o capacity at this time  HCP/Surrogate: Elena is daughter r(Cristhian defers to her as primary decision maker at this time)   Code Status: FULLL CODE (famiyl considering changes)  MOLST: na  Dispo Plan: pending further treatment     Discussed With: Case coordinated with attending and SW and RN     Time Spent: 78 minutes including the care, coordination and counseling of this patient, 50% of which was spent coordinating and counseling.

## 2023-01-17 NOTE — CONSULT NOTE ADULT - NS ATTEND AMEND GEN_ALL_CORE FT
Patient with advanced dementia , who fell from chair , and also prior falls , noted tohave elevated troponin , in setting uncontrolled hypertension , possible demand related ischemia , follow up echo ,   ( no ekg changes )

## 2023-01-17 NOTE — PHYSICAL THERAPY INITIAL EVALUATION ADULT - PERTINENT HX OF CURRENT PROBLEM, REHAB EVAL
recent fall outside 1/11/23 sustaining L sided pelvic fxs (sup/inf pubic fxs) evaluated at [facility] and discharged to [facility]; pt was eating a meal in the dining room there pushed back from table in chair and struck her head on a radiator, noted to be rubbing back f head and sent to ED for assessment recent fall outside on sidewalk while accompanied by PCA for walk in neighborhood only 3 house away from her home on 1/11/23 sustaining L sided pelvic fxs (sup/inf pubic fxs) evaluated at [facility] and discharged to [facility]; pt was eating a meal in the dining room there pushed back from table in chair and struck her head on a radiator, noted to be rubbing back of  head and sent to ED for assessment recent fall outside on sidewalk while accompanied by PCA for walk in neighborhood only 3 house away from her home on 1/11/23 sustaining L sided pelvic fxs (sup/inf pubic fxs) evaluated at [facility] and discharged to [facility]; pt was eating a meal in the dining room there pushed back from table in chair and struck her head on a radiator, noted to be rubbing back of  head and sent to ED for assessment and rteportedly c/o chest pain recent fall outside on sidewalk while accompanied by PCA for walk in neighborhood only 3 house away from her home on 1/11/23 sustaining L sided pelvic fxs (sup/inf pubic fxs) evaluated at [facility] and discharged to [facility]; pt was eating a meal in the dining room there pushed back from table in chair and struck her head on a radiator, noted to be rubbing back of  head and sent to ED for assessment and reportedly c/o chest pain

## 2023-01-17 NOTE — PHYSICAL THERAPY INITIAL EVALUATION ADULT - LEVEL OF INDEPENDENCE, REHAB EVAL
to partially roll to unaffected R side using SR/moderate assist (50% patients effort)/maximum assist (25% patients effort)

## 2023-01-17 NOTE — CONSULT NOTE ADULT - SUBJECTIVE AND OBJECTIVE BOX
REQUESTING PHYSICIAN:  Hospitalist    REASON FOR CONSULT: Elevated troponin    CHIEF COMPLAINT:    HPI:  86 y/o F with PMH left pubic fracture, Alzheimer's disease (A+Ox1 at baseline per daughter with waxing/waning confusion), HLD, hypothyroidism, anxiety disorder, constipation, urinary incontinence, GERD presented from [facility] with hitting her head. Documentation from [facility] states that the pt pushed back in a chair and the head of the chair came into contact with the radiator in the dining room. She was holding the back of her head after the episode. No syncope. Additionally, she was complaining of chest discomfort and her BP was elevated to the 160's. Upon my evaluation pt is A+Ox1 and unable to provide additional history. She denied chest pain on multiple occasions but did complain of left hip pain. I left a voicemail for her daughter and am awaiting a call back.     ER course: /92. Labs: Hb 11.1 (baseline unknown), glucose 115, AST 46, troponin 117.51. EKG: NSR with HR 98 bpm,  ms, no ST segment changes, no T wave inversions, no prior EKGs to compare to (personally reviewed).     Imaging:   - XR hips/pelvis: left pubic rami fracture, no femur fracture b/l (personally reviewed). - CXR: no consolidation, no effusion, no pneumothorax (personally reviewed).   - CT cervical spine:  No evidence of acute osseous injury.  - CT head: No acute intracranial hemorrhage, mass effect, or evidence of acute vascular territorial infarction. If clinical symptoms persist or worsen, more sensitive evaluation with brain MRI may be obtained, if no contraindications exist.    Pt was given a total of 325 mg of ASA, tylenol, morphine. She is being placed on telemetry observation for further management.  (17 Jan 2023 05:09)    Cardiology consult requested for elevated troponin.  Patient denies chest pain or shortness of breath,  She complains of left hip pain.  Patient is unable to provide additional history due to dementia. I called and left a voicemail for patient's daughter to call back.  After reviewing patient's chart, patient sustained a fall 9 days ago and was admitted to [facility] with a pelvic fracture.  She was discharged to [facility] for rehab.        PAST MEDICAL AND SURGICAL HISTORY:  PAST MEDICAL & SURGICAL HISTORY:  Dementia      HLD (hyperlipidemia)      Pelvic fracture      Hypothyroidism      Anxiety      H/O constipation      H/O urinary incontinence      GERD (gastroesophageal reflux disease)      No significant past surgical history          ALLERGIES:  Allergies    No Known Allergies    Intolerances        SOCIAL HISTORY:  Social History:      FAMILY  HISTORY:  FAMILY HISTORY:  Patient unable to provide medical history (Father, Mother)        MEDICATIONS:  OUTPATIENT:  Home Medications:  acetaminophen: 975 milligram(s) orally every 8 hours, As Needed (17 Jan 2023 04:51)  Ativan 2 mg/mL injectable solution: 1 milligram(s) injectable once a day, As Needed (17 Jan 2023 04:51)  donepezil 10 mg oral tablet: 1 tab(s) orally once a day (at bedtime) (17 Jan 2023 04:51)  escitalopram 10 mg oral tablet: 1 tab(s) orally once a day (17 Jan 2023 04:51)  levothyroxine 25 mcg (0.025 mg) oral tablet: 1 tab(s) orally once a day (at bedtime) (17 Jan 2023 04:51)  Lovenox 40 mg/0.4 mL injectable solution: 40 milligram(s) injectable once a day (17 Jan 2023 04:51)  memantine 10 mg oral tablet: 1 tab(s) orally once a day (17 Jan 2023 04:51)  MiraLax oral powder for reconstitution: 17 gram(s) orally once a day (17 Jan 2023 04:51)  omeprazole 20 mg oral delayed release capsule: 1 cap(s) orally once a day (17 Jan 2023 04:51)  traMADol 50 mg oral tablet: 1 tab(s) orally every 4 hours, As Needed (17 Jan 2023 04:51)  traMADol 50 mg oral tablet: 75 tab(s) orally every 4 hours, As Needed - for severe pain (17 Jan 2023 04:51)  trospium 20 mg oral tablet: 1 tab(s) orally once a day (17 Jan 2023 04:51)      INPATIENT:  MEDICATIONS  (STANDING):  donepezil 10 milliGRAM(s) Oral at bedtime  enoxaparin Injectable 40 milliGRAM(s) SubCutaneous every 24 hours  escitalopram 10 milliGRAM(s) Oral daily  levothyroxine 25 MICROGram(s) Oral daily  lidocaine   4% Patch 1 Patch Transdermal daily  memantine 10 milliGRAM(s) Oral daily  naloxone Injectable 0.4 milliGRAM(s) IV Push once  nitroglycerin     SubLingual 0.4 milliGRAM(s) SubLingual every 5 minutes  oxybutynin 5 milliGRAM(s) Oral two times a day  pantoprazole    Tablet 40 milliGRAM(s) Oral before breakfast  polyethylene glycol 3350 17 Gram(s) Oral daily    MEDICATIONS  (PRN):  acetaminophen     Tablet .. 975 milliGRAM(s) Oral every 8 hours PRN Temp greater or equal to 38C (100.4F), Moderate Pain (4 - 6)  aluminum hydroxide/magnesium hydroxide/simethicone Suspension 30 milliLiter(s) Oral every 4 hours PRN Dyspepsia  cyclobenzaprine 5 milliGRAM(s) Oral every 8 hours PRN Spasm  melatonin 3 milliGRAM(s) Oral at bedtime PRN Insomnia  ondansetron Injectable 4 milliGRAM(s) IV Push every 8 hours PRN Nausea and/or Vomiting  traMADol 50 milliGRAM(s) Oral every 4 hours PRN Moderate Pain (4 - 6)      REVIEW OF SYSTEMS: Unable to obtain due to dementia    Vital Signs Last 24 Hrs  T(C): 37.1 (17 Jan 2023 08:51), Max: 37.1 (17 Jan 2023 08:51)  T(F): 98.7 (17 Jan 2023 08:51), Max: 98.7 (17 Jan 2023 08:51)  HR: 91 (17 Jan 2023 08:51) (91 - 100)  BP: 128/67 (17 Jan 2023 08:51) (128/67 - 159/92)  BP(mean): --  RR: 18 (17 Jan 2023 08:51) (18 - 18)  SpO2: 95% (17 Jan 2023 08:51) (94% - 99%)    Parameters below as of 17 Jan 2023 08:51  Patient On (Oxygen Delivery Method): room air        I&O's Summary      I&O's Detail      PHYSICAL EXAM:    Constitutional: NAD,   HEENT: PERR, EOMI,  No oral cyananosis.  Neck:  supple,  No JVD  Respiratory: Breath sounds are clear bilaterally, No wheezing, rales or rhonchi  Cardiovascular: S1 and S2, regular rate and rhythm, no Murmurs, gallops or rubs  Gastrointestinal: Bowel Sounds present, soft, nontender.   Extremities: No peripheral edema. No clubbing or cyanosis.  Vascular: 2+ peripheral pulses  Neurological: A/O x 1, no focal deficits  Musculoskeletal: no calf tenderness.  Skin: No rashes.    ===============================  ===============================  LABS:                         10.3   6.92  )-----------( 276      ( 17 Jan 2023 06:48 )             30.8                         11.1   7.35  )-----------( 291      ( 17 Jan 2023 03:31 )             32.8     17 Jan 2023 06:48    138    |  101    |  14     ----------------------------<  108    3.9     |  30     |  0.81   17 Jan 2023 03:31    138    |  99     |  15     ----------------------------<  115    3.9     |  29     |  0.80     Ca    8.8        17 Jan 2023 06:48  Ca    8.8        17 Jan 2023 03:31    TPro  7.0    /  Alb  3.1    /  TBili  0.5    /  DBili  x      /  AST  44     /  ALT  43     /  AlkPhos  78     17 Jan 2023 06:48  TPro  7.6    /  Alb  3.3    /  TBili  0.7    /  DBili  x      /  AST  46     /  ALT  44     /  AlkPhos  85     17 Jan 2023 03:31        THYROID STUDIES:    Thyroid Stimulating Hormone, Serum (01.17.23 @ 06:48)   Thyroid Stimulating Hormone, Serum: 2.04 uU/mL ===============================  ===============================  CARDIAC BIOMARKERS:  -BNP VALUES:  01-17 @ 03:31  Pro Bnp 454      -TROPONIN VALUES:  -------  lab item   Troponin I, High Sensitivity Result: 115.86 ng/L *H* (01-17-23 @ 06:48)  Troponin I, High Sensitivity Result: 117.51 ng/L *H* (01-17-23 @ 03:31)      lab panel    ===============================  ===============================  BLOOD CULTURES:     ===============================  ===============================  EKG: Normal sinus rhythm    TELE: SR  ===============================  RADIOLOGY:   Chest X-ray:      ===============================  ECHO:    ===============================  CARDIAC CATH:    ===============================  STRESS TESTING:        < from: CT Head No Cont (01.16.23 @ 23:18) >  IMPRESSION:    No acute intracranial hemorrhage, mass effect, or evidence of acute   vascular territorial infarction. If clinical symptoms persist or worsen,   more sensitive evaluation with brain MRI may be obtained, if no   contraindications exist.    < end of copied text >        < from: CT Cervical Spine No Cont (01.17.23 @ 01:05) >  IMPRESSION: No evidence of acute osseous injury.    < end of copied text >                        ===============================

## 2023-01-17 NOTE — PHYSICAL THERAPY INITIAL EVALUATION ADULT - NS ASR WT BEARING DETAIL LLE
weight-bearing as tolerated 50% protected PWB LLE per Ortho Resident in patient care orders written today at 1307pm/partial weight-bearing

## 2023-01-17 NOTE — PATIENT PROFILE ADULT - VISION (WITH CORRECTIVE LENSES IF THE PATIENT USUALLY WEARS THEM):
patient poor historian. unable to assess./Normal vision: sees adequately in most situations; can see medication labels, newsprint

## 2023-01-17 NOTE — PHYSICAL THERAPY INITIAL EVALUATION ADULT - ADDITIONAL COMMENTS
per fernanda Parker at bedside , pt was beginning to amb short distances at [facility] prior to DC using RW/1PA, was using bedside commode ; sent to [facility] late Friday 1/13, assessed by PT at [facility] 1/14 but did not have any services Sun/Mon due to holiday weekend and then admitted here per dtian Parker at bedside , pt was beginning to amb short distances at [facility] prior to DC using RW/1PA, was using bedside commode ; sent to [facility] late Friday 1/13, assessed by PT at [facility] 1/14 but did not have any services Sun/Mon due to holiday weekend and then admitted here after reported head strike on radiator

## 2023-01-17 NOTE — PHYSICAL THERAPY INITIAL EVALUATION ADULT - LIVES WITH, PROFILE
resides with daughter in [place]/children resides with daughter Elena in [place], had PCA when daughter unavailable/children

## 2023-01-17 NOTE — ED PROVIDER NOTE - CLINICAL SUMMARY MEDICAL DECISION MAKING FREE TEXT BOX
87-year-old female with head and neck pain after hitting her head on the radiator behind her also with a documented complaint of chest pain however she denies that here.  Will obtain head CT and C-spine as well as labs, chest x-ray, left hip and pelvis x-ray.  Labs noted along with elevated troponin.  Patient was given aspirin 162 mg.  A second dose of 162 mg ordered.  Left pelvic fracture noted however patient is currently at [facility] subacute rehab for treatment of left pelvis fracture.  This is likely not new.   will have orthopedics evaluate patient.  Consult placed. There was no documented history of any falls and already has a history of a current left pelvis fracture.  will give small dose of IV morphine for pain.  signout given to hospitalist for admission.  recommending observation status.  Patient placed on observation.  AMOR

## 2023-01-17 NOTE — PHYSICAL THERAPY INITIAL EVALUATION ADULT - SKIN COLOR/CHARACTERISTICS
L proximal thigh medially with recent L pubic fxs/bruised (ecchymotic) L proximal thigh medially with recent L pubic ring fxs & L hemisacral fx/bruised (ecchymotic)

## 2023-01-17 NOTE — PHYSICAL THERAPY INITIAL EVALUATION ADULT - ACTIVE RANGE OF MOTION EXAMINATION, REHAB EVAL
AAROM LLE performed slowly/guardedly dueto pain/stiffness/nataliia. upper extremity Active ROM was WNL (within normal limits)/RLE Active ROM was WNL (within normal limits)/Left LE Active ROM was WFL (within functional limits)

## 2023-01-17 NOTE — CONSULT NOTE ADULT - PROBLEM SELECTOR RECOMMENDATION 9
Mildly elevated troponin: likely due to demand ischemia.  Patient without complaints of chest pain or shortness of breath.  Waiting for family to call back to review PMH.  EKG does not show acute findings.  No events noted on tele.  Will obtain echocardiogram.  Will trend one more troponin ( currently downtrending)

## 2023-01-17 NOTE — ED PROVIDER NOTE - OBJECTIVE STATEMENT
87-year-old female sent from [facility] for evaluation of head and neck pain after hitting her head on a radiator behind her.  Patient was sitting in a recliner and when she leaned backwards she hit her head on a metal radiator.  Patient is complaining of some neck pain.  Patient also noted to be complaining of some left hip pain however states has been ongoing for some time.  Has a known left-sided pelvic fracture which she is being cared for at [facility] for.  Paperwork also documents she complained of chest pain.  Here she denies any pain in her chest.  Also denies any shortness of breath, cough, fever, nausea, vomiting, diarrhea.  Patient does have a documented history of dementia.  tried using language line video  with patient unsuccessfully.  Patient did not understand how to respond to the  on the video screen.  Have been able to speak with her via Kiswahili speaking nurse at bedside.

## 2023-01-17 NOTE — PHYSICAL THERAPY INITIAL EVALUATION ADULT - PLANNED THERAPY INTERVENTIONS, PT EVAL
pain/edema relieving modalities L groin/buttock region/bed mobility training/gait training/ROM/strengthening/transfer training

## 2023-01-18 ENCOUNTER — NON-APPOINTMENT (OUTPATIENT)
Age: 88
End: 2023-01-18

## 2023-01-18 LAB — TROPONIN I, HIGH SENSITIVITY RESULT: 111.35 NG/L — HIGH

## 2023-01-18 PROCEDURE — 99233 SBSQ HOSP IP/OBS HIGH 50: CPT

## 2023-01-18 PROCEDURE — 93010 ELECTROCARDIOGRAM REPORT: CPT

## 2023-01-18 PROCEDURE — 99232 SBSQ HOSP IP/OBS MODERATE 35: CPT

## 2023-01-18 RX ORDER — SIMETHICONE 80 MG/1
80 TABLET, CHEWABLE ORAL ONCE
Refills: 0 | Status: COMPLETED | OUTPATIENT
Start: 2023-01-18 | End: 2023-01-18

## 2023-01-18 RX ADMIN — LIDOCAINE 1 PATCH: 4 CREAM TOPICAL at 19:00

## 2023-01-18 RX ADMIN — TRAMADOL HYDROCHLORIDE 50 MILLIGRAM(S): 50 TABLET ORAL at 20:31

## 2023-01-18 RX ADMIN — CYCLOBENZAPRINE HYDROCHLORIDE 5 MILLIGRAM(S): 10 TABLET, FILM COATED ORAL at 05:19

## 2023-01-18 RX ADMIN — Medication 25 MICROGRAM(S): at 05:18

## 2023-01-18 RX ADMIN — DONEPEZIL HYDROCHLORIDE 10 MILLIGRAM(S): 10 TABLET, FILM COATED ORAL at 20:31

## 2023-01-18 RX ADMIN — ESCITALOPRAM OXALATE 10 MILLIGRAM(S): 10 TABLET, FILM COATED ORAL at 09:29

## 2023-01-18 RX ADMIN — TRAMADOL HYDROCHLORIDE 50 MILLIGRAM(S): 50 TABLET ORAL at 10:40

## 2023-01-18 RX ADMIN — Medication 5 MILLIGRAM(S): at 20:31

## 2023-01-18 RX ADMIN — TRAMADOL HYDROCHLORIDE 50 MILLIGRAM(S): 50 TABLET ORAL at 10:45

## 2023-01-18 RX ADMIN — LIDOCAINE 1 PATCH: 4 CREAM TOPICAL at 21:30

## 2023-01-18 RX ADMIN — Medication 81 MILLIGRAM(S): at 09:29

## 2023-01-18 RX ADMIN — POLYETHYLENE GLYCOL 3350 17 GRAM(S): 17 POWDER, FOR SOLUTION ORAL at 09:30

## 2023-01-18 RX ADMIN — TRAMADOL HYDROCHLORIDE 50 MILLIGRAM(S): 50 TABLET ORAL at 05:18

## 2023-01-18 RX ADMIN — TRAMADOL HYDROCHLORIDE 50 MILLIGRAM(S): 50 TABLET ORAL at 05:48

## 2023-01-18 RX ADMIN — LIDOCAINE 1 PATCH: 4 CREAM TOPICAL at 09:30

## 2023-01-18 RX ADMIN — PANTOPRAZOLE SODIUM 40 MILLIGRAM(S): 20 TABLET, DELAYED RELEASE ORAL at 05:19

## 2023-01-18 RX ADMIN — TRAMADOL HYDROCHLORIDE 50 MILLIGRAM(S): 50 TABLET ORAL at 16:00

## 2023-01-18 RX ADMIN — Medication 5 MILLIGRAM(S): at 09:30

## 2023-01-18 RX ADMIN — ENOXAPARIN SODIUM 40 MILLIGRAM(S): 100 INJECTION SUBCUTANEOUS at 09:29

## 2023-01-18 RX ADMIN — SIMETHICONE 80 MILLIGRAM(S): 80 TABLET, CHEWABLE ORAL at 12:11

## 2023-01-18 RX ADMIN — MEMANTINE HYDROCHLORIDE 10 MILLIGRAM(S): 10 TABLET ORAL at 09:30

## 2023-01-18 NOTE — PROGRESS NOTE ADULT - PROBLEM SELECTOR PLAN 1
Mildly elevated troponin: likely due to demand ischemia.  Patient without complaints of chest pain or shortness of breath.    flat trops 117->115->111  EKG does not show acute findings.  No events noted on tele  Echo - preserved EF 55%-60%, moderate AR/MR/TR    pt. is stable from cardiac standpoint, will sign off

## 2023-01-18 NOTE — PROGRESS NOTE ADULT - NS ATTEND AMEND GEN_ALL_CORE FT
Patient with advanced dementia , who fell from chair , and also prior falls , noted tohave elevated troponin , in setting uncontrolled hypertension , possible demand related ischemia , follow up echo ,   ( no ekg changes ) Patient with advanced dementia , who fell from chair , and also prior falls , noted tohave elevated troponin , in setting uncontrolled hypertension , possible demand related ischemia , follow up echo

## 2023-01-18 NOTE — PROGRESS NOTE ADULT - SUBJECTIVE AND OBJECTIVE BOX
HPI:    HPI:     88 y/o F with PMH left pubic fracture, Alzheimer's disease (A+Ox1 at baseline per daughter with waxing/waning confusion), HLD, hypothyroidism, anxiety disorder, constipation, urinary incontinence, GERD presented from [facility] with hitting her head. Documentation from [facility] states that the pt pushed back in a chair and the head of the chair came into contact with the radiator in the dining room. She was holding the back of her head after the episode.      Patient was marcelle and examined bedside this morning  confused , mumbling responses  I spoke to her in Ivorian directly but could not answer questions entirely appropriately  She did say no to nausea and vomiting and when it came to pain couldn't say yes or no.          PAIN: ( )Yes   x( )No  Pt unable to characterise d/t clinical status     DYSPNEA: ( ) Yes  (x ) No  Level:    Review of Systems:    Anxiety- denies  Depression-denies  Physical Discomfort- in NAD states its worse when she's moving   Dyspnea-denies  Constipation-denies  Diarrhea-denies  Nausea-denies  Vomiting-denies  Anorexia-denies  Weight Loss- denies  Cough-denies  Secretions-denies  Fatigue-denies  Weakness-denies  Delirium-denies    All other systems reviewed and negative       PHYSICAL EXAM:    Vital Signs Last 24 Hrs  T(C): 36.3 (19 Jan 2023 07:49), Max: 36.6 (18 Jan 2023 21:05)  T(F): 97.4 (19 Jan 2023 07:49), Max: 97.9 (18 Jan 2023 21:05)  HR: 97 (19 Jan 2023 07:49) (96 - 97)  BP: 165/82 (19 Jan 2023 07:49) (159/78 - 165/82)  BP(mean): --  RR: 17 (19 Jan 2023 07:49) (17 - 18)  SpO2: 93% (19 Jan 2023 07:49) (93% - 96%)    Parameters below as of 19 Jan 2023 07:49  Patient On (Oxygen Delivery Method): room air      Daily     Daily     PPSV2:   45%  FAST:    General:  HEENT:  Lungs:  Cardiac:  GI:  :  Ext:  Neuro:      LABS:            Albumin: Albumin, Serum: 3.1 g/dL (01-17 @ 06:48)      Allergies    No Known Allergies    Intolerances      MEDICATIONS  (STANDING):  aspirin  chewable 81 milliGRAM(s) Oral daily  donepezil 10 milliGRAM(s) Oral at bedtime  enoxaparin Injectable 40 milliGRAM(s) SubCutaneous every 24 hours  escitalopram 10 milliGRAM(s) Oral daily  levothyroxine 25 MICROGram(s) Oral daily  lidocaine   4% Patch 1 Patch Transdermal daily  memantine 10 milliGRAM(s) Oral daily  naloxone Injectable 0.4 milliGRAM(s) IV Push once  nitroglycerin     SubLingual 0.4 milliGRAM(s) SubLingual every 5 minutes  oxybutynin 5 milliGRAM(s) Oral two times a day  pantoprazole    Tablet 40 milliGRAM(s) Oral before breakfast  polyethylene glycol 3350 17 Gram(s) Oral daily    MEDICATIONS  (PRN):  acetaminophen     Tablet .. 975 milliGRAM(s) Oral every 8 hours PRN Temp greater or equal to 38C (100.4F), Moderate Pain (4 - 6)  aluminum hydroxide/magnesium hydroxide/simethicone Suspension 30 milliLiter(s) Oral every 4 hours PRN Dyspepsia  cyclobenzaprine 5 milliGRAM(s) Oral every 8 hours PRN Spasm  melatonin 3 milliGRAM(s) Oral at bedtime PRN Insomnia  ondansetron Injectable 4 milliGRAM(s) IV Push every 8 hours PRN Nausea and/or Vomiting  traMADol 50 milliGRAM(s) Oral every 4 hours PRN Moderate Pain (4 - 6)      RADIOLOGY: HPI:    86 y/o F with PMH left pubic fracture, Alzheimer's disease (A+Ox1 at baseline per daughter with waxing/waning confusion), HLD, hypothyroidism, anxiety disorder, constipation, urinary incontinence, GERD presented from [facility] with hitting her head. Documentation from [facility] states that the pt pushed back in a chair and the head of the chair came into contact with the radiator in the dining room. She was holding the back of her head after the episode.      Patient was marcelle and examined bedside this morning  confused , mumbling responses  I spoke to her in Tajik directly but could not answer questions entirely appropriately  She did say no to nausea and vomiting and when it came to pain couldn't say yes or no.        1/18/22    pt seen this morning far more awake and responds appropriately to questions   appropraitely feels her pain is controlled and educated and reminded to ask for pain meds if needed- team informed to try and pre medicate for activities that erquire movment.       PAIN: ( )Yes   x( )No  Pt unable to characterise d/t clinical status     DYSPNEA: ( ) Yes  (x ) No  Level:    Review of Systems:    Anxiety- denies  Depression-denies  Physical Discomfort- in NAD states its worse when she's moving   Dyspnea-denies  Constipation-denies  Diarrhea-denies  Nausea-denies  Vomiting-denies  Anorexia-denies  Weight Loss- denies  Cough-denies  Secretions-denies  Fatigue-denies  Weakness-denies  Delirium-denies    All other systems reviewed and negative       PHYSICAL EXAM:    Vital Signs Last 24 Hrs  T(C): 36.3 (19 Jan 2023 07:49), Max: 36.6 (18 Jan 2023 21:05)  T(F): 97.4 (19 Jan 2023 07:49), Max: 97.9 (18 Jan 2023 21:05)  HR: 97 (19 Jan 2023 07:49) (96 - 97)  BP: 165/82 (19 Jan 2023 07:49) (159/78 - 165/82)  BP(mean): --  RR: 17 (19 Jan 2023 07:49) (17 - 18)  SpO2: 93% (19 Jan 2023 07:49) (93% - 96%)    Parameters below as of 19 Jan 2023 07:49  Patient On (Oxygen Delivery Method): room air      Daily     Daily     PPSV2:   45%  FAST:    General: calm in NAD  Mental Status: awake alert oriented x2  HEENT: eomi, perrl  Lungs: ctabl b/l bs  Cardiac: s1s2 no mgr  GI: soft nontender +BS  : voids  Ext: moves all 4 extremities spontaneously  Neuro: no gross findings       LABS:            Albumin: Albumin, Serum: 3.1 g/dL (01-17 @ 06:48)      Allergies    No Known Allergies    Intolerances      MEDICATIONS  (STANDING):  aspirin  chewable 81 milliGRAM(s) Oral daily  donepezil 10 milliGRAM(s) Oral at bedtime  enoxaparin Injectable 40 milliGRAM(s) SubCutaneous every 24 hours  escitalopram 10 milliGRAM(s) Oral daily  levothyroxine 25 MICROGram(s) Oral daily  lidocaine   4% Patch 1 Patch Transdermal daily  memantine 10 milliGRAM(s) Oral daily  naloxone Injectable 0.4 milliGRAM(s) IV Push once  nitroglycerin     SubLingual 0.4 milliGRAM(s) SubLingual every 5 minutes  oxybutynin 5 milliGRAM(s) Oral two times a day  pantoprazole    Tablet 40 milliGRAM(s) Oral before breakfast  polyethylene glycol 3350 17 Gram(s) Oral daily    MEDICATIONS  (PRN):  acetaminophen     Tablet .. 975 milliGRAM(s) Oral every 8 hours PRN Temp greater or equal to 38C (100.4F), Moderate Pain (4 - 6)  aluminum hydroxide/magnesium hydroxide/simethicone Suspension 30 milliLiter(s) Oral every 4 hours PRN Dyspepsia  cyclobenzaprine 5 milliGRAM(s) Oral every 8 hours PRN Spasm  melatonin 3 milliGRAM(s) Oral at bedtime PRN Insomnia  ondansetron Injectable 4 milliGRAM(s) IV Push every 8 hours PRN Nausea and/or Vomiting  traMADol 50 milliGRAM(s) Oral every 4 hours PRN Moderate Pain (4 - 6)      RADIOLOGY:

## 2023-01-18 NOTE — PROGRESS NOTE ADULT - SUBJECTIVE AND OBJECTIVE BOX
CHIEF COMPLAINT:hitting head, chest pain     HPI:  88 y/o F with PMH left pubic fracture, Alzheimer's disease (A+Ox1 at baseline per daughter with waxing/waning confusion), HLD, hypothyroidism, anxiety disorder, constipation, urinary incontinence, GERD presented from [facility] with hitting her head. Documentation from [facility] states that the pt pushed back in a chair and the head of the chair came into contact with the radiator in the dining room. She was holding the back of her head after the episode. No syncope. Additionally, she was complaining of chest discomfort and her BP was elevated to the 160's. Upon my evaluation pt is A+Ox1 and unable to provide additional history. She denied chest pain on multiple occasions but did complain of left hip pain. I left a voicemail for her daughter and am awaiting a call back.   ER course: /92. Labs: Hb 11.1 (baseline unknown), glucose 115, AST 46, troponin 117.51. EKG: NSR with HR 98 bpm,  ms, no ST segment changes, no T wave inversions, no prior EKGs to compare to (personally reviewed).   Imaging:   - XR hips/pelvis: left pubic rami fracture, no femur fracture b/l (personally reviewed). - CXR: no consolidation, no effusion, no pneumothorax (personally reviewed).   - CT cervical spine:  No evidence of acute osseous injury.  - CT head: No acute intracranial hemorrhage, mass effect, or evidence of acute vascular territorial infarction. If clinical symptoms persist or worsen, more sensitive evaluation with brain MRI may be obtained, if no contraindications exist.  Pt was given a total of 325 mg of ASA, tylenol, morphine. She is being placed on telemetry observation for further management.  (17 Jan 2023 05:09)  Cardiology consult requested for elevated troponin.  Patient denies chest pain or shortness of breath,  She complains of left hip pain.  Patient is unable to provide additional history due to dementia. I called and left a voicemail for patient's daughter to call back.  After reviewing patient's chart, patient sustained a fall 9 days ago and was admitted to [facility] with a pelvic fracture.  She was discharged to [facility] for rehab.    1/18/23: pt. denies SOB, palpitations, c/o chest pain - reproducible on palpation, non cardiac, reproducible on palpation, due to musculoskeletal s/p Fall,       MEDICATIONS:  OUTPATIENT:  Home Medications:  acetaminophen: 975 milligram(s) orally every 8 hours, As Needed (17 Jan 2023 04:51)  Ativan 2 mg/mL injectable solution: 1 milligram(s) injectable once a day, As Needed (17 Jan 2023 04:51)  donepezil 10 mg oral tablet: 1 tab(s) orally once a day (at bedtime) (17 Jan 2023 04:51)  escitalopram 10 mg oral tablet: 1 tab(s) orally once a day (17 Jan 2023 04:51)  levothyroxine 25 mcg (0.025 mg) oral tablet: 1 tab(s) orally once a day (at bedtime) (17 Jan 2023 04:51)  Lovenox 40 mg/0.4 mL injectable solution: 40 milligram(s) injectable once a day (17 Jan 2023 04:51)  memantine 10 mg oral tablet: 1 tab(s) orally once a day (17 Jan 2023 04:51)  MiraLax oral powder for reconstitution: 17 gram(s) orally once a day (17 Jan 2023 04:51)  omeprazole 20 mg oral delayed release capsule: 1 cap(s) orally once a day (17 Jan 2023 04:51)  traMADol 50 mg oral tablet: 1 tab(s) orally every 4 hours, As Needed (17 Jan 2023 04:51)  traMADol 50 mg oral tablet: 75 tab(s) orally every 4 hours, As Needed - for severe pain (17 Jan 2023 04:51)  trospium 20 mg oral tablet: 1 tab(s) orally once a day (17 Jan 2023 04:51)      INPATIENT:  MEDICATIONS  (STANDING):  donepezil 10 milliGRAM(s) Oral at bedtime  enoxaparin Injectable 40 milliGRAM(s) SubCutaneous every 24 hours  escitalopram 10 milliGRAM(s) Oral daily  levothyroxine 25 MICROGram(s) Oral daily  lidocaine   4% Patch 1 Patch Transdermal daily  memantine 10 milliGRAM(s) Oral daily  naloxone Injectable 0.4 milliGRAM(s) IV Push once  nitroglycerin     SubLingual 0.4 milliGRAM(s) SubLingual every 5 minutes  oxybutynin 5 milliGRAM(s) Oral two times a day  pantoprazole    Tablet 40 milliGRAM(s) Oral before breakfast  polyethylene glycol 3350 17 Gram(s) Oral daily    MEDICATIONS  (PRN):  acetaminophen     Tablet .. 975 milliGRAM(s) Oral every 8 hours PRN Temp greater or equal to 38C (100.4F), Moderate Pain (4 - 6)  aluminum hydroxide/magnesium hydroxide/simethicone Suspension 30 milliLiter(s) Oral every 4 hours PRN Dyspepsia  cyclobenzaprine 5 milliGRAM(s) Oral every 8 hours PRN Spasm  melatonin 3 milliGRAM(s) Oral at bedtime PRN Insomnia  ondansetron Injectable 4 milliGRAM(s) IV Push every 8 hours PRN Nausea and/or Vomiting  traMADol 50 milliGRAM(s) Oral every 4 hours PRN Moderate Pain (4 - 6)    Vital Signs Last 24 Hrs  T(C): 37.2 (18 Jan 2023 08:04), Max: 37.2 (18 Jan 2023 08:04)  T(F): 99 (18 Jan 2023 08:04), Max: 99 (18 Jan 2023 08:04)  HR: 90 (18 Jan 2023 08:04) (90 - 97)  BP: 136/76 (18 Jan 2023 08:04) (136/76 - 137/60)  BP(mean): --  RR: 18 (18 Jan 2023 08:04) (18 - 18)  SpO2: 95% (18 Jan 2023 08:04) (95% - 96%)    Parameters below as of 18 Jan 2023 08:04  Patient On (Oxygen Delivery Method): room air      PHYSICAL EXAM:    Constitutional: NAD,   HEENT: PERR, EOMI,  No oral cyananosis.  Neck:  supple,  No JVD  Respiratory: Breath sounds are clear bilaterally, No wheezing, rales or rhonchi  Cardiovascular: S1 and S2, regular rate and rhythm, no Murmurs, gallops or rubs  Gastrointestinal: Bowel Sounds present, soft, nontender.   Extremities: No peripheral edema. No clubbing or cyanosis.  Vascular: 2+ peripheral pulses  Neurological: A/O x 1, no focal deficits  Musculoskeletal: no calf tenderness.  Skin: No rashes.    ===============================  ===============================  LABS:                           10.3   6.92  )-----------( 276      ( 17 Jan 2023 06:48 )             30.8     01-17    138  |  101  |  14  ----------------------------<  108<H>  3.9   |  30  |  0.81    Ca    8.8      17 Jan 2023 06:48    TPro  7.0  /  Alb  3.1<L>  /  TBili  0.5  /  DBili  x   /  AST  44<H>  /  ALT  43  /  AlkPhos  78  01-17    - TroponinI hsT: <-111.35, <-115.86, <-117.51                        10.3   6.92  )-----------( 276      ( 17 Jan 2023 06:48 )             30.8                         11.1   7.35  )-----------( 291      ( 17 Jan 2023 03:31 )             32.8     17 Jan 2023 06:48    138    |  101    |  14     ----------------------------<  108    3.9     |  30     |  0.81   17 Jan 2023 03:31    138    |  99     |  15     ----------------------------<  115    3.9     |  29     |  0.80     Ca    8.8        17 Jan 2023 06:48  Ca    8.8        17 Jan 2023 03:31    TPro  7.0    /  Alb  3.1    /  TBili  0.5    /  DBili  x      /  AST  44     /  ALT  43     /  AlkPhos  78     17 Jan 2023 06:48  TPro  7.6    /  Alb  3.3    /  TBili  0.7    /  DBili  x      /  AST  46     /  ALT  44     /  AlkPhos  85     17 Jan 2023 03:31        THYROID STUDIES:    Thyroid Stimulating Hormone, Serum (01.17.23 @ 06:48)   Thyroid Stimulating Hormone, Serum: 2.04 uU/mL ===============================  ===============================  CARDIAC BIOMARKERS:  -BNP VALUES:  01-17 @ 03:31  Pro Bnp 454      -TROPONIN VALUES:  -------  lab item   Troponin I, High Sensitivity Result: 115.86 ng/L *H* (01-17-23 @ 06:48)  Troponin I, High Sensitivity Result: 117.51 ng/L *H* (01-17-23 @ 03:31)      lab panel    ===============================  ===============================  BLOOD CULTURES:     ===============================  ===============================  EKG: Normal sinus rhythm    TELE: SR  ===============================  RADIOLOGY:   Chest X-ray:      ===============================  ECHO:    ===============================  CARDIAC CATH:    ===============================  STRESS TESTING:  < from: TTE Echo Complete w/o Contrast w/ Doppler (01.17.23 @ 12:19) >    Summary     The mitral valve leaflets appear thickened.   EA reversal of the mitral inflow consistent with reduced compliance of   the   left ventricle.   Moderate (2+) mitral regurgitation is present.   The aortic valve is well visualized, appears calcified. Valve opening   seems to be normal.   Mild to Moderate aortic regurgitation is present.   Normal appearing tricuspid valve structure.   Mild to Moderate Tricuspid regurgitation is present.   Mild pulmonary hypertension.   Pulmonic valve not well seen.   No pulmonic valvular regurgitation is seen.   Normal appearing left atrium.   The left ventricle is normal in size, wall thickness, wall motion and   contractility.   Estimated left ventricular ejection fraction is 55-60 %.   Normal appearing right atrium.   Normal appearing right ventricle structure and function.    < end of copied text >        < from: CT Head No Cont (01.16.23 @ 23:18) >  IMPRESSION:    No acute intracranial hemorrhage, mass effect, or evidence of acute   vascular territorial infarction. If clinical symptoms persist or worsen,   more sensitive evaluation with brain MRI may be obtained, if no   contraindications exist.    < end of copied text >        < from: CT Cervical Spine No Cont (01.17.23 @ 01:05) >  IMPRESSION: No evidence of acute osseous injury.    < end of copied text >                        ===============================

## 2023-01-18 NOTE — PROGRESS NOTE ADULT - SUBJECTIVE AND OBJECTIVE BOX
HOSPITALIST ATTENDING PROGRESS NOTE    Chart and meds reviewed.  Patient seen and examined.    CC: CP    Subjective: Some CP today, w belching, self limited. Denies SOB, dizziness.    All other systems reviewed and found to be negative with the exception of what has been described above.    MEDICATIONS  (STANDING):  aspirin  chewable 81 milliGRAM(s) Oral daily  donepezil 10 milliGRAM(s) Oral at bedtime  enoxaparin Injectable 40 milliGRAM(s) SubCutaneous every 24 hours  escitalopram 10 milliGRAM(s) Oral daily  levothyroxine 25 MICROGram(s) Oral daily  lidocaine   4% Patch 1 Patch Transdermal daily  memantine 10 milliGRAM(s) Oral daily  naloxone Injectable 0.4 milliGRAM(s) IV Push once  nitroglycerin     SubLingual 0.4 milliGRAM(s) SubLingual every 5 minutes  oxybutynin 5 milliGRAM(s) Oral two times a day  pantoprazole    Tablet 40 milliGRAM(s) Oral before breakfast  polyethylene glycol 3350 17 Gram(s) Oral daily    MEDICATIONS  (PRN):  acetaminophen     Tablet .. 975 milliGRAM(s) Oral every 8 hours PRN Temp greater or equal to 38C (100.4F), Moderate Pain (4 - 6)  aluminum hydroxide/magnesium hydroxide/simethicone Suspension 30 milliLiter(s) Oral every 4 hours PRN Dyspepsia  cyclobenzaprine 5 milliGRAM(s) Oral every 8 hours PRN Spasm  melatonin 3 milliGRAM(s) Oral at bedtime PRN Insomnia  ondansetron Injectable 4 milliGRAM(s) IV Push every 8 hours PRN Nausea and/or Vomiting  traMADol 50 milliGRAM(s) Oral every 4 hours PRN Moderate Pain (4 - 6)      VITALS:  T(F): 99 (01-18-23 @ 08:04), Max: 99 (01-18-23 @ 08:04)  HR: 90 (01-18-23 @ 08:04) (90 - 90)  BP: 136/76 (01-18-23 @ 08:04) (136/76 - 136/76)  RR: 18 (01-18-23 @ 08:04) (18 - 18)  SpO2: 95% (01-18-23 @ 08:04) (95% - 95%)  Wt(kg): --    I&O's Summary      CAPILLARY BLOOD GLUCOSE          PHYSICAL EXAM:  Gen: NAD  HEENT:  pupils equal and reactive, EOMI, no oropharyngeal lesions, erythema, exudates, oral thrush  NECK:   supple, no carotid bruits, no palpable lymph nodes, no thyromegaly  CV:  +S1, +S2, regular, no murmurs or rubs  RESP:   lungs clear to auscultation bilaterally, no wheezing, rales, rhonchi, good air entry bilaterally  BREAST:  not examined  GI:  abdomen soft, non-tender, non-distended, normal BS, no bruits, no abdominal masses, no palpable masses  RECTAL:  not examined  :  not examined  MSK:   normal muscle tone, no atrophy, no rigidity, no contractions  EXT:  no clubbing, no cyanosis, no edema, no calf pain, swelling or erythema  VASCULAR:  pulses equal and symmetric in the upper and lower extremities  NEURO:  AAOX3, no focal neurological deficits, follows all commands, able to move extremities spontaneously  SKIN:  no ulcers, lesions or rashes    LABS:                            10.3   6.92  )-----------( 276      ( 17 Jan 2023 06:48 )             30.8     01-17    138  |  101  |  14  ----------------------------<  108<H>  3.9   |  30  |  0.81    Ca    8.8      17 Jan 2023 06:48    TPro  7.0  /  Alb  3.1<L>  /  TBili  0.5  /  DBili  x   /  AST  44<H>  /  ALT  43  /  AlkPhos  78  01-17        LIVER FUNCTIONS - ( 17 Jan 2023 06:48 )  Alb: 3.1 g/dL / Pro: 7.0 gm/dL / ALK PHOS: 78 U/L / ALT: 43 U/L / AST: 44 U/L / GGT: x           CULTURES:  no new    Additional results/Imaging, I have personally reviewed:  CTH 1/16/23: No acute intracranial hemorrhage, mass effect, or evidence of acute vascular territorial infarction. If clinical symptoms persist or worsen, more sensitive evaluation with brain MRI may be obtained, if no contraindications exist.    CT cspine 1/17/23: No evidence of acute osseous injury.    L hip, pelvis xray 1/17/23: Fractures around the low left pelvis with some displacement. CAT scan may be advisable.    L hip, pelvis xray 1/17/23: 1. Slightly comminuted and displaced superior and inferior pubic rami fractures. 2. Minor degenerative changes of both hips with no fracture on either side.    CT bony pelvis 1/17/23: Acute comminuted displaced fractures of left superior and inferior pubic rami. Acute comminuted fracture of the left hemisacrum.    L femur xray 1/17/23: 1. No acute fracture or dislocation is seen involving the left femur with only mild degenerative arthropathy of the left hip and left knee. 2. Left pubic ring fracture discussed under separate cover.    Echo 1/17/23: The mitral valve leaflets appear thickened. EA reversal of the mitral inflow consistent with reduced compliance of the left ventricle. Moderate (2+) mitral regurgitation is present. The aortic valve is well visualized, appears calcified. Valve opening seems to be normal. Mild to Moderate aortic regurgitation is present. Normal appearing tricuspid valve structure. Mild to Moderate Tricuspid regurgitation is present. Mild pulmonary hypertension. Pulmonic valve not well seen. No pulmonic valvular regurgitation is seen. Normal appearing left atrium. The left ventricle is normal in size, wall thickness, wall motion and contractility. Estimated left ventricular ejection fraction is 55-60 %. Normal appearing right atrium. Normal appearing right ventricle structure and function.    Telemetry, personally reviewed:  1/17/23: sinus   1/18/23: sinus , d/c tele

## 2023-01-19 ENCOUNTER — TRANSCRIPTION ENCOUNTER (OUTPATIENT)
Age: 88
End: 2023-01-19

## 2023-01-19 VITALS
TEMPERATURE: 97 F | OXYGEN SATURATION: 93 % | RESPIRATION RATE: 17 BRPM | SYSTOLIC BLOOD PRESSURE: 165 MMHG | HEART RATE: 97 BPM | DIASTOLIC BLOOD PRESSURE: 82 MMHG

## 2023-01-19 PROCEDURE — 99239 HOSP IP/OBS DSCHRG MGMT >30: CPT

## 2023-01-19 RX ADMIN — ENOXAPARIN SODIUM 40 MILLIGRAM(S): 100 INJECTION SUBCUTANEOUS at 09:25

## 2023-01-19 RX ADMIN — Medication 25 MICROGRAM(S): at 07:05

## 2023-01-19 RX ADMIN — Medication 5 MILLIGRAM(S): at 09:23

## 2023-01-19 RX ADMIN — TRAMADOL HYDROCHLORIDE 50 MILLIGRAM(S): 50 TABLET ORAL at 13:30

## 2023-01-19 RX ADMIN — ESCITALOPRAM OXALATE 10 MILLIGRAM(S): 10 TABLET, FILM COATED ORAL at 09:23

## 2023-01-19 RX ADMIN — POLYETHYLENE GLYCOL 3350 17 GRAM(S): 17 POWDER, FOR SOLUTION ORAL at 09:22

## 2023-01-19 RX ADMIN — PANTOPRAZOLE SODIUM 40 MILLIGRAM(S): 20 TABLET, DELAYED RELEASE ORAL at 07:05

## 2023-01-19 RX ADMIN — MEMANTINE HYDROCHLORIDE 10 MILLIGRAM(S): 10 TABLET ORAL at 09:23

## 2023-01-19 RX ADMIN — LIDOCAINE 1 PATCH: 4 CREAM TOPICAL at 09:23

## 2023-01-19 RX ADMIN — Medication 81 MILLIGRAM(S): at 09:22

## 2023-01-19 NOTE — PROGRESS NOTE ADULT - ASSESSMENT
86 y/o F with PMH left pubic fracture, Alzheimer's disease (A+Ox1 at baseline per daughter with waxing/waning confusion), HLD, hypothyroidism, anxiety disorder, constipation, urinary incontinence, GERD presented from [facility] with hitting her head. Documentation from [facility] states that the pt pushed back in a chair and the head of the chair came into contact with the radiator in the dining room.    #Demand ischemia  -likely in setting of HTN  -trop plateaud, EKG nonischemic  -echo as above  -f/u cards recs    # Head injury   - CT head and neck negative for acute pathology   - No reports of syncope     #Left pubic rami fracture   - appreciate ortho input, no current surgical plan  -PWB 50% LLE  -pain control  -PT    #DVT ppx: Lovenox 40 mg subcutaneous daily   Emergency contact: Elena (daughter) [phone]     DNR/DNI, MOLST completed. F/u further pall care recs    
88 y/o F with PMH left pubic fracture, Alzheimer's disease (A+Ox1 at baseline per daughter with waxing/waning confusion), HLD, hypothyroidism, anxiety disorder, constipation, urinary incontinence, GERD presented from [facility] with hitting her head. Documentation from [facility] states that the pt pushed back in a chair and the head of the chair came into contact with the radiator in the dining room.    #Demand ischemia  -likely in setting of HTN  -trop plateaud, EKG nonischemic  -echo as above  -appreciate cards input, no further inpt w/u    # Head injury   - CT head and neck negative for acute pathology   - No reports of syncope     #Left pubic rami fracture   - appreciate ortho input, no current surgical plan  -PWB 50% LLE  -pain control  -PT    #DVT ppx: Lovenox 40 mg subcutaneous daily   Emergency contact: Elena (daughter) [phone]     DNR/DNI, MOLST completed. Appreciate pall care input    Pending LEOPOLDO, medically ready      
88 y/o F with PMH left pubic fracture, Alzheimer's disease (A+Ox1 at baseline per daughter with waxing/waning confusion), HLD, hypothyroidism, anxiety disorder, constipation, urinary incontinence, GERD presented from [facility] with hitting her head. Documentation from [facility] states that the pt pushed back in a chair and the head of the chair came into contact with the radiator in the dining room.    Mildly elevated trops here, 2/2 demand ischemia.    #Demand ischemia  -likely in setting of HTN  -trop plateaud, EKG nonischemic  -echo as above  -appreciate cards input, no further inpt w/u    # Head injury   - CT head and neck negative for acute pathology   - No reports of syncope     #Left pubic rami fracture   - appreciate ortho input, no current surgical plan  -PWB 50% LLE  -Follow up outpatient with Dr. Ford in 2-3 weeks  -pain control  -PT    #DVT ppx: Lovenox 40 mg subcutaneous daily   Emergency contact: Elena (daughter) [phone]     DNR/DNI, MOLST completed. Appreciate pall care input    For LEOPOLDO. Medically ready.        
Process of Care  --Reviewed dx/treatment problems and alignment with Goals of Care    Physical Aspects of Care  --Pain  pt pain wasn't well controlled in past until Tramdol started OP.     Tylenol 1000mg TID  Tramadol 50mg [id number] prn for moderate pain  Lidocaine patch Qdaily     Pt is elderly w/ dementia, would be cautious and start at lower doses especialyl if she's responding well to them.   DC'd Tramadol 75mg and Morphine 4mg IV       --Bowel Regimen   risk for constipation d/t immobility  daily Miralax  if no BM x 2 days add Dulcolax     --Dyspnea  No SOB at this time  comfortable and in NAD    --Nausea Vomiting  denies    --Weakness  PT eval     --Delirium     melatonin 5mg qhs  -Avoid deliriogenic agents including BZD  continue to avoid anticholinergics, and sedating agents if possible  -Re-orient frequently, encourage family at bedside as able.   -work w/ PT adn dc once stable    Psychological and Psychiatric Aspects of Care:   --Greif/Bereavment: emotional support provided    --Hx of psychiatric dx: none  -Pastoral Care Available PRN     Social Aspects of Care  -SW involved     Cultural Aspects  -Primary Language: Senegalese      Goals of Care:     DNR DNI  c/w current measures  dc back to [facility]         Prognosis: Death can occur at anytime, but if disease continues to progress naturally patient likely has days to weeks.    Ethical and Legal Aspects:   NA        Capacity: w/o capacity at this time  HCP/Surrogate: Elena is daughter r(Cristhian defers to her as primary decision maker at this time)   Code Status: DNR DNI   MOLST: MOLST COMPLETED 1/17 PM  Dispo Plan: DC BACK TO [facility]    Discussed With: Case coordinated with attending and SW and RN     Time Spent: 50 minutes including the care, coordination and counseling of this patient, 50% of which was spent coordinating and counseling.

## 2023-01-19 NOTE — DISCHARGE NOTE PROVIDER - HOSPITAL COURSE
CC: CP  HPI and Hospital course: 88 y/o F with PMH left pubic fracture, Alzheimer's disease (A+Ox1 at baseline per daughter with waxing/waning confusion), HLD, hypothyroidism, anxiety disorder, constipation, urinary incontinence, GERD presented from [facility] with hitting her head. [facility] that the pt pushed back in a chair and the head of the chair came into contact with the radiator in the dining room.    Mildly elevated trops here, 2/2 demand ischemia.     VITALS:  T(F): 97.4 (01-19-23 @ 07:49), Max: 97.9 (01-18-23 @ 21:05)  HR: 97 (01-19-23 @ 07:49) (96 - 97)  BP: 165/82 (01-19-23 @ 07:49) (159/78 - 165/82)  RR: 17 (01-19-23 @ 07:49) (17 - 18)  SpO2: 93% (01-19-23 @ 07:49) (93% - 96%)    PHYSICAL EXAM:  General: NAD, lying in bed  HEENT:  pupils equal and reactive, EOMI, no oropharyngeal lesions, erythema, exudates, oral thrush  NECK:   supple, no carotid bruits, no palpable lymph nodes, no thyromegaly  CV:  +S1, +S2, regular, no murmurs or rubs  RESP:   lungs clear to auscultation bilaterally, no wheezing, rales, rhonchi, good air entry bilaterally  BREAST:  not examined  GI:  abdomen soft, non-tender, non-distended, normal BS, no bruits, no abdominal masses, no palpable masses  RECTAL:  not examined  :  not examined  MSK:   normal muscle tone, no atrophy, no rigidity, no contractions  EXT:  no clubbing, no cyanosis, no edema, no calf pain, swelling or erythema  VASCULAR:  pulses equal and symmetric in the upper and lower extremities  NEURO:  AAOX3, no focal neurological deficits, follows all commands, able to move extremities spontaneously  SKIN:  no ulcers, lesions or rashes    CTH 1/16/23: No acute intracranial hemorrhage, mass effect, or evidence of acute vascular territorial infarction. If clinical symptoms persist or worsen, more sensitive evaluation with brain MRI may be obtained, if no contraindications exist.    CT cspine 1/17/23: No evidence of acute osseous injury.    L hip, pelvis xray 1/17/23: Fractures around the low left pelvis with some displacement. CAT scan may be advisable.    L hip, pelvis xray 1/17/23: 1. Slightly comminuted and displaced superior and inferior pubic rami fractures. 2. Minor degenerative changes of both hips with no fracture on either side.    CT bony pelvis 1/17/23: Acute comminuted displaced fractures of left superior and inferior pubic rami. Acute comminuted fracture of the left hemisacrum.    L femur xray 1/17/23: 1. No acute fracture or dislocation is seen involving the left femur with only mild degenerative arthropathy of the left hip and left knee. 2. Left pubic ring fracture discussed under separate cover.    Echo 1/17/23: The mitral valve leaflets appear thickened. EA reversal of the mitral inflow consistent with reduced compliance of the left ventricle. Moderate (2+) mitral regurgitation is present. The aortic valve is well visualized, appears calcified. Valve opening seems to be normal. Mild to Moderate aortic regurgitation is present. Normal appearing tricuspid valve structure. Mild to Moderate Tricuspid regurgitation is present. Mild pulmonary hypertension. Pulmonic valve not well seen. No pulmonic valvular regurgitation is seen. Normal appearing left atrium. The left ventricle is normal in size, wall thickness, wall motion and contractility. Estimated left ventricular ejection fraction is 55-60 %. Normal appearing right atrium. Normal appearing right ventricle structure and function.    #Demand ischemia  -likely in setting of HTN  -trop plateaud, EKG nonischemic  -echo as above  -appreciate cards input, no further inpt w/u    # Head injury   - CT head and neck negative for acute pathology   - No reports of syncope     #Left pubic rami fracture   - appreciate ortho input, no current surgical plan  -PWB 50% LLE  -Follow up outpatient with Dr. Ford in 2-3 weeks  -pain control  -PT    #DVT ppx: Lovenox 40 mg subcutaneous daily   Emergency contact: Elena (daughter) [phone]     DNR/DNI, MOLST completed. Appreciate pall care input    For LEOPOLDO.  I have spent 34 min on day of d/c coordinating care.

## 2023-01-19 NOTE — DISCHARGE NOTE PROVIDER - NSDCCPCAREPLAN_GEN_ALL_CORE_FT
PRINCIPAL DISCHARGE DIAGNOSIS  Diagnosis: Pelvic fracture  Assessment and Plan of Treatment: -PWB 50% LLE  -Follow up outpatient with Dr. Ford in 2-3 weeks  -pain control, encourage tramadol use

## 2023-01-19 NOTE — DISCHARGE NOTE NURSING/CASE MANAGEMENT/SOCIAL WORK - PATIENT PORTAL LINK FT
You can access the FollowMyHealth Patient Portal offered by [facility] by registering at the following website: [email or web address]. By joining [employer]’s FollowMyHealth portal, you will also be able to view your health information using other applications (apps) compatible with our system.

## 2023-01-19 NOTE — DISCHARGE NOTE PROVIDER - NSDCFUSCHEDAPPT_GEN_ALL_CORE_FT
Keny Israel  [facility] Physician [facility] [address]  Scheduled Appointment: 03/13/2023

## 2023-01-19 NOTE — DISCHARGE NOTE PROVIDER - CARE PROVIDER_API CALL
Papito Ford (DO)  Orthopaedic Surgery  [address] [place] [address]  Phone: [phone]  Fax: [phone]  Follow Up Time: 2 weeks

## 2023-01-19 NOTE — PROGRESS NOTE ADULT - REASON FOR ADMISSION
Hitting her head, chest pain

## 2023-01-19 NOTE — DISCHARGE NOTE NURSING/CASE MANAGEMENT/SOCIAL WORK - NSDCPEFALRISK_GEN_ALL_CORE
For information on Fall & Injury Prevention, visit: [email or web address] OR  [email or web address] OR  https://www.cdc.gov/steadi/patient.html

## 2023-01-24 DIAGNOSIS — S32.502D UNSPECIFIED FRACTURE OF LEFT PUBIS, SUBSEQUENT ENCOUNTER FOR FRACTURE WITH ROUTINE HEALING: ICD-10-CM

## 2023-01-24 DIAGNOSIS — Z20.822 CONTACT WITH AND (SUSPECTED) EXPOSURE TO COVID-19: ICD-10-CM

## 2023-01-24 DIAGNOSIS — F02.80 DEMENTIA IN OTHER DISEASES CLASSIFIED ELSEWHERE, UNSPECIFIED SEVERITY, WITHOUT BEHAVIORAL DISTURBANCE, PSYCHOTIC DISTURBANCE, MOOD DISTURBANCE, AND ANXIETY: ICD-10-CM

## 2023-01-24 DIAGNOSIS — Z79.890 HORMONE REPLACEMENT THERAPY: ICD-10-CM

## 2023-01-24 DIAGNOSIS — R40.2362 COMA SCALE, BEST MOTOR RESPONSE, OBEYS COMMANDS, AT ARRIVAL TO EMERGENCY DEPARTMENT: ICD-10-CM

## 2023-01-24 DIAGNOSIS — S06.9X0A UNSPECIFIED INTRACRANIAL INJURY WITHOUT LOSS OF CONSCIOUSNESS, INITIAL ENCOUNTER: ICD-10-CM

## 2023-01-24 DIAGNOSIS — R40.2242 COMA SCALE, BEST VERBAL RESPONSE, CONFUSED CONVERSATION, AT ARRIVAL TO EMERGENCY DEPARTMENT: ICD-10-CM

## 2023-01-24 DIAGNOSIS — G30.9 ALZHEIMER'S DISEASE, UNSPECIFIED: ICD-10-CM
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
## 2023-01-24 DIAGNOSIS — E78.5 HYPERLIPIDEMIA, UNSPECIFIED: ICD-10-CM

## 2023-01-24 DIAGNOSIS — D64.9 ANEMIA, UNSPECIFIED: ICD-10-CM

## 2023-01-24 DIAGNOSIS — Z66 DO NOT RESUSCITATE: ICD-10-CM

## 2023-01-24 DIAGNOSIS — E03.9 HYPOTHYROIDISM, UNSPECIFIED: ICD-10-CM

## 2023-01-24 DIAGNOSIS — I10 ESSENTIAL (PRIMARY) HYPERTENSION: ICD-10-CM

## 2023-01-24 DIAGNOSIS — I24.8 OTHER FORMS OF ACUTE ISCHEMIC HEART DISEASE: ICD-10-CM

## 2023-01-24 DIAGNOSIS — K21.9 GASTRO-ESOPHAGEAL REFLUX DISEASE WITHOUT ESOPHAGITIS: ICD-10-CM
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
## 2023-01-24 DIAGNOSIS — F05 DELIRIUM DUE TO KNOWN PHYSIOLOGICAL CONDITION: ICD-10-CM

## 2023-01-24 DIAGNOSIS — R40.2142 COMA SCALE, EYES OPEN, SPONTANEOUS, AT ARRIVAL TO EMERGENCY DEPARTMENT: ICD-10-CM

## 2023-01-24 DIAGNOSIS — K59.00 CONSTIPATION, UNSPECIFIED: ICD-10-CM

## 2023-02-02 PROBLEM — Z87.898 PERSONAL HISTORY OF OTHER SPECIFIED CONDITIONS: Chronic | Status: ACTIVE | Noted: 2023-01-17

## 2023-02-02 PROBLEM — Z87.19 PERSONAL HISTORY OF OTHER DISEASES OF THE DIGESTIVE SYSTEM: Chronic | Status: ACTIVE | Noted: 2023-01-17

## 2023-02-02 PROBLEM — E03.9 HYPOTHYROIDISM, UNSPECIFIED: Chronic | Status: ACTIVE | Noted: 2023-01-17

## 2023-02-02 PROBLEM — F03.90 UNSPECIFIED DEMENTIA, UNSPECIFIED SEVERITY, WITHOUT BEHAVIORAL DISTURBANCE, PSYCHOTIC DISTURBANCE, MOOD DISTURBANCE, AND ANXIETY: Chronic | Status: ACTIVE | Noted: 2023-01-17

## 2023-02-02 PROBLEM — E78.5 HYPERLIPIDEMIA, UNSPECIFIED: Chronic | Status: ACTIVE | Noted: 2023-01-17

## 2023-02-02 PROBLEM — F41.9 ANXIETY DISORDER, UNSPECIFIED: Chronic | Status: ACTIVE | Noted: 2023-01-17

## 2023-02-02 PROBLEM — S32.9XXA FRACTURE OF UNSPECIFIED PARTS OF LUMBOSACRAL SPINE AND PELVIS, INITIAL ENCOUNTER FOR CLOSED FRACTURE: Chronic | Status: ACTIVE | Noted: 2023-01-17

## 2023-02-02 PROBLEM — K21.9 GASTRO-ESOPHAGEAL REFLUX DISEASE WITHOUT ESOPHAGITIS: Chronic | Status: ACTIVE | Noted: 2023-01-17

## 2023-02-07 ENCOUNTER — APPOINTMENT (OUTPATIENT)
Dept: ORTHOPEDIC SURGERY | Facility: CLINIC | Age: 88
End: 2023-02-07
Payer: MEDICARE

## 2023-02-07 PROCEDURE — 99204 OFFICE O/P NEW MOD 45 MIN: CPT

## 2023-02-07 PROCEDURE — 72170 X-RAY EXAM OF PELVIS: CPT

## 2023-02-10 ENCOUNTER — NON-APPOINTMENT (OUTPATIENT)
Age: 88
End: 2023-02-10

## 2023-03-07 ENCOUNTER — APPOINTMENT (OUTPATIENT)
Dept: GERIATRICS | Facility: CLINIC | Age: 88
End: 2023-03-07
Payer: MEDICARE

## 2023-03-07 VITALS
OXYGEN SATURATION: 97 % | RESPIRATION RATE: 16 BRPM | HEIGHT: 62.4 IN | BODY MASS INDEX: 22.71 KG/M2 | DIASTOLIC BLOOD PRESSURE: 100 MMHG | SYSTOLIC BLOOD PRESSURE: 178 MMHG | WEIGHT: 125 LBS | HEART RATE: 80 BPM

## 2023-03-07 VITALS — DIASTOLIC BLOOD PRESSURE: 95 MMHG | SYSTOLIC BLOOD PRESSURE: 160 MMHG

## 2023-03-07 PROCEDURE — 99214 OFFICE O/P EST MOD 30 MIN: CPT

## 2023-03-07 RX ORDER — QUETIAPINE FUMARATE 25 MG/1
25 TABLET ORAL
Qty: 90 | Refills: 3 | Status: COMPLETED | COMMUNITY
Start: 2021-05-11 | End: 2023-03-07

## 2023-03-09 NOTE — REVIEW OF SYSTEMS
[Incontinence] : incontinence [Back Pain] : back pain [Negative] : Heme/Lymph [Abdominal Pain] : no abdominal pain [Joint Pain] : no joint pain [Muscle Pain] : no muscle pain [Headache] : no headache [Dizziness] : no dizziness [FreeTextEntry9] : **see HPI** [de-identified] : Reports agitation **see HPI**

## 2023-03-09 NOTE — END OF VISIT
[] : A student assisted with documenting this visit. I have reviewed and verified all information documented by the student, and made modifications to such information, when appropriate. [FreeTextEntry3] : Pt seen with NP student\par Pt stable, declined memory since hospital. pt with 24 horu care. Safe, taking part in day program. \par Eating well.\par MOLST completed in hospital.

## 2023-03-09 NOTE — PHYSICAL EXAM
[No Acute Distress] : no acute distress [Normal Sclera/Conjunctiva] : normal sclera/conjunctiva [PERRL] : pupils equal round and reactive to light [EOMI] : extraocular movements intact [Normal Outer Ear/Nose] : the outer ears and nose were normal in appearance [No JVD] : no jugular venous distention [No Respiratory Distress] : no respiratory distress  [No Accessory Muscle Use] : no accessory muscle use [Clear to Auscultation] : lungs were clear to auscultation bilaterally [Normal Rate] : normal rate  [Regular Rhythm] : with a regular rhythm [Normal S1, S2] : normal S1 and S2 [No Murmur] : no murmur heard [No Carotid Bruits] : no carotid bruits [No Edema] : there was no peripheral edema [Soft] : abdomen soft [Non Tender] : non-tender [Non-distended] : non-distended [No Masses] : no abdominal mass palpated [No HSM] : no HSM [Normal Bowel Sounds] : normal bowel sounds [No CVA Tenderness] : no CVA  tenderness [No Spinal Tenderness] : no spinal tenderness [No Joint Swelling] : no joint swelling [No Rash] : no rash [No Focal Deficits] : no focal deficits [Normal Affect] : the affect was normal [de-identified] : alert

## 2023-03-09 NOTE — ASSESSMENT
[FreeTextEntry1] : 87 year old with dementia here for post discharge follow up visit s/p fall with pubic rami and sacral fx. \par \par 1)sacral fx-Patient is WBAT AND will f/u with Dr. Alberts on 3/27 for repeat x rays. continue with PRN tylenol for mgmt of acute pain. Patient will continue with physical therapy through [facility].\par \par 2)Hypertension-patient hypertensive today with /95. discussed with daughter. will consider initiating antihypertensive if persists. Pt denies any cardiopulmonary symptoms upon exam.\par \par 3)Dementia- pt with increased agitation since hospitalization. Will increase lexapro to 15 mg daily.\par \par 3)Osteoporosis-pt with f/u with Dr. Israel on monday 3/13/23.\par \par 4)LABS: CMP, CBC w DIFF and Sed rate. labs drawn in office. will noitify daughter when resulted.

## 2023-03-09 NOTE — HISTORY OF PRESENT ILLNESS
[Family Member] : family member [FreeTextEntry1] : Follow up s/p hospitalization  [de-identified] : 87 year old female with pmh of Alzheimers dementia with behavioral disturbance, GERD, HTN, hypothyroidism, OA here for follow up. Patient is s/p fall with pubic rami and sacral fx. \par -pt hospitalized at [facility] and d/c to rehab at [facility].\par -pt is now at home with 24/7 A services and receiving home physical therapy. \par -Followed  by Dr. Alberts for mgmt of fx which is being treated conservatively. \par -Patient with increased agitation during hospitalization which daughter states patient was given IV ativan. \par -Patient continues with agitation since d/c to home.Daughter states she never used PRN seroquel as patient never needed it prior.\par - Otherwise, Denies fever, dyspnea, dysuria, urinary frequency, shortness of breath, chest pain or palpitations at present time.

## 2023-03-13 ENCOUNTER — APPOINTMENT (OUTPATIENT)
Dept: ENDOCRINOLOGY | Facility: CLINIC | Age: 88
End: 2023-03-13
Payer: MEDICARE

## 2023-03-13 VITALS
DIASTOLIC BLOOD PRESSURE: 95 MMHG | SYSTOLIC BLOOD PRESSURE: 160 MMHG | BODY MASS INDEX: 22.71 KG/M2 | HEART RATE: 83 BPM | WEIGHT: 125 LBS | OXYGEN SATURATION: 97 % | HEIGHT: 62.4 IN

## 2023-03-13 DIAGNOSIS — Z81.8 FAMILY HISTORY OF OTHER MENTAL AND BEHAVIORAL DISORDERS: ICD-10-CM

## 2023-03-13 PROCEDURE — 77080 DXA BONE DENSITY AXIAL: CPT

## 2023-03-13 PROCEDURE — 99204 OFFICE O/P NEW MOD 45 MIN: CPT | Mod: 25

## 2023-03-13 PROCEDURE — ZZZZZ: CPT

## 2023-03-14 PROBLEM — Z81.8 FAMILY HISTORY OF DEMENTIA: Status: ACTIVE | Noted: 2023-03-14

## 2023-03-14 RX ADMIN — ZOLEDRONIC ACID 5 MG/100ML: 5 INJECTION, SOLUTION INTRAVENOUS at 00:00

## 2023-03-14 NOTE — PHYSICAL EXAM
[Alert] : alert [Well Nourished] : well nourished [No Acute Distress] : no acute distress [Well Developed] : well developed [Normal Sclera/Conjunctiva] : normal sclera/conjunctiva [EOMI] : extra ocular movement intact [No Proptosis] : no proptosis [Normal Oropharynx] : the oropharynx was normal [Thyroid Not Enlarged] : the thyroid was not enlarged [No Thyroid Nodules] : no palpable thyroid nodules [No Respiratory Distress] : no respiratory distress [No Accessory Muscle Use] : no accessory muscle use [Clear to Auscultation] : lungs were clear to auscultation bilaterally [Normal S1, S2] : normal S1 and S2 [Normal Rate] : heart rate was normal [Regular Rhythm] : with a regular rhythm [No Edema] : no peripheral edema [Pedal Pulses Normal] : the pedal pulses are present [Normal Bowel Sounds] : normal bowel sounds [Not Tender] : non-tender [Soft] : abdomen soft [Not Distended] : not distended [Normal Anterior Cervical Nodes] : no anterior cervical lymphadenopathy [No Spinal Tenderness] : no spinal tenderness [Kyphosis] : kyphosis present [No Stigmata of Cushings Syndrome] : no stigmata of Cushings Syndrome [Normal Gait] : normal gait [Normal Strength/Tone] : muscle strength and tone were normal [No Rash] : no rash [Acanthosis Nigricans] : no acanthosis nigricans [de-identified] : Elderly female

## 2023-03-14 NOTE — REASON FOR VISIT
[Consultation] : a consultation visit [Osteoporosis] : osteoporosis [Formal Caregiver] : formal caregiver [Source: ______] : History obtained from [facility] [FreeTextEntry2] : Laureen Conti MD

## 2023-03-14 NOTE — REVIEW OF SYSTEMS
[As Noted in HPI] : as noted in HPI [All other systems negative] : All other systems negative [de-identified] : Progressive dementia

## 2023-03-14 NOTE — ASSESSMENT
[FreeTextEntry1] : 87-year-old female presents with progressive osteoporosis with recent pelvic fracture.  Bone density 2023 shows decline in spine versus prior study.  Patient does not appear to have any unusual risk factors for osteoporosis.  Routine work-up for secondary causes have been unremarkable\par Patient is clearly at high risk for future fracture.  Options of therapy were discussed in great detail with patient.  I have recommended intravenous Reclast as the most efficient treatment given the patient's progressive dementia and potential issues with standard medication.  The patient has significant history of acid reflux and may not be a candidate for standard therapy and may not be able to report adverse effects.\par Risks and benefits reviewed.\par \par Recommended calcium 500 mg per day, vitamin D. 1000 units per day, in addition to dietary intake. \par Referred to rheumatology infusion center\par \par History of hypothyroidism for many years clinically euthyroid on low-dose levothyroxine 25 mcg/day.  Last TSH normal 1.95.

## 2023-03-14 NOTE — PROCEDURE
[FreeTextEntry1] : Bone mineral density March 13, 2023\par Compared to 2022\par Indication new fractures\par Spine -3.3 osteoporosis prior report -2.9\par  total hip -1.9 osteopenia prior report -2.1\par Femoral neck -2.5 osteoporosis prior report -2.7\par Proximal radius -3.4 osteoporosis no prior report\par

## 2023-03-14 NOTE — CONSULT LETTER
[Consult Letter:] : I had the pleasure of evaluating your patient, [unfilled]. [FreeTextEntry2] : Laureen Conti MD

## 2023-03-14 NOTE — HISTORY OF PRESENT ILLNESS
[FreeTextEntry1] : 87-year-old female referred for management of osteoporosis.  Patient has been told of low bone density for many years.  She took Fosamax in the past for many years although the details are not available.  She apparently has been off Fosamax for at least 10 years.  She recently suffered a pelvic fracture- m left sided pubic ramus fracture and sacral fracture after a fall.  She denies other previous fractures.\par Previous bone density test March 2022 that showed low values.  Spine -2.9 femoral neck -2.7 total hip -1.9.\par The patient denies any unusual risk factors for osteoporosis.\par \par Pt has no Hx of kidney stones or calcium issues. No Hx of anorexia nervosa, premature menopause, amenorrhea during reproductive years. No h/o celiac disease, malabsorption, nutritional deficiencies. . No ulcers or bleeding. No other  unusual risks for osteoporosis such as FHx hip fracture, suggestion of OI. No Hx of RT. No chronic prednisone use. No Hx of Paget's disease. No Hx of DVT or PE. Up to date with routine care. \par Medical history is notable for progressive dementia.\par Endocrine history is notable for hypothyroidism for approximately 15 years currently on very low-dose Synthroid 25 mcg daily.  If patient appears to be clinically euthyroid on this low-dose.

## 2023-03-15 LAB
ALBUMIN SERPL ELPH-MCNC: 4.2 G/DL
ALP BLD-CCNC: 154 U/L
ALT SERPL-CCNC: 11 U/L
ANION GAP SERPL CALC-SCNC: 18 MMOL/L
AST SERPL-CCNC: 23 U/L
BASOPHILS # BLD AUTO: 0.07 K/UL
BASOPHILS NFR BLD AUTO: 1.3 %
BILIRUB SERPL-MCNC: <0.2 MG/DL
BUN SERPL-MCNC: 16 MG/DL
CALCIUM SERPL-MCNC: 9.6 MG/DL
CHLORIDE SERPL-SCNC: 104 MMOL/L
CO2 SERPL-SCNC: 20 MMOL/L
CREAT SERPL-MCNC: 0.94 MG/DL
EGFR: 59 ML/MIN/1.73M2
EOSINOPHIL # BLD AUTO: 0.32 K/UL
EOSINOPHIL NFR BLD AUTO: 6.1 %
ERYTHROCYTE [SEDIMENTATION RATE] IN BLOOD BY WESTERGREN METHOD: 67 MM/HR
GLUCOSE SERPL-MCNC: 93 MG/DL
HCT VFR BLD CALC: 37.2 %
HGB BLD-MCNC: 11.8 G/DL
IMM GRANULOCYTES NFR BLD AUTO: 0.2 %
LYMPHOCYTES # BLD AUTO: 2.38 K/UL
LYMPHOCYTES NFR BLD AUTO: 45.2 %
MAN DIFF?: NORMAL
MCHC RBC-ENTMCNC: 29.9 PG
MCHC RBC-ENTMCNC: 31.7 GM/DL
MCV RBC AUTO: 94.4 FL
MONOCYTES # BLD AUTO: 0.44 K/UL
MONOCYTES NFR BLD AUTO: 8.4 %
NEUTROPHILS # BLD AUTO: 2.04 K/UL
NEUTROPHILS NFR BLD AUTO: 38.8 %
PLATELET # BLD AUTO: 345 K/UL
POTASSIUM SERPL-SCNC: 5 MMOL/L
PROT SERPL-MCNC: 7.4 G/DL
RBC # BLD: 3.94 M/UL
RBC # FLD: 14.2 %
SODIUM SERPL-SCNC: 143 MMOL/L
WBC # FLD AUTO: 5.26 K/UL

## 2023-03-27 ENCOUNTER — APPOINTMENT (OUTPATIENT)
Dept: ORTHOPEDIC SURGERY | Facility: CLINIC | Age: 88
End: 2023-03-27
Payer: MEDICARE

## 2023-03-27 PROCEDURE — 72170 X-RAY EXAM OF PELVIS: CPT

## 2023-03-27 PROCEDURE — 99024 POSTOP FOLLOW-UP VISIT: CPT

## 2023-04-13 ENCOUNTER — FORM ENCOUNTER (OUTPATIENT)
Age: 88
End: 2023-04-13

## 2023-04-16 ENCOUNTER — NON-APPOINTMENT (OUTPATIENT)
Age: 88
End: 2023-04-16

## 2023-04-18 ENCOUNTER — APPOINTMENT (OUTPATIENT)
Dept: RHEUMATOLOGY | Facility: CLINIC | Age: 88
End: 2023-04-18
Payer: MEDICARE

## 2023-04-18 VITALS
OXYGEN SATURATION: 98 % | DIASTOLIC BLOOD PRESSURE: 86 MMHG | SYSTOLIC BLOOD PRESSURE: 160 MMHG | TEMPERATURE: 97.8 F | HEART RATE: 78 BPM | RESPIRATION RATE: 16 BRPM

## 2023-04-18 VITALS
RESPIRATION RATE: 16 BRPM | HEART RATE: 74 BPM | DIASTOLIC BLOOD PRESSURE: 82 MMHG | OXYGEN SATURATION: 97 % | TEMPERATURE: 97.8 F | SYSTOLIC BLOOD PRESSURE: 154 MMHG

## 2023-04-18 PROCEDURE — 96374 THER/PROPH/DIAG INJ IV PUSH: CPT | Mod: 59

## 2023-04-18 RX ORDER — ZOLEDRONIC ACID 5 MG/100ML
5 INJECTION INTRAVENOUS
Qty: 0 | Refills: 0 | Status: COMPLETED | OUTPATIENT
Start: 2023-03-14

## 2023-04-21 NOTE — HISTORY OF PRESENT ILLNESS
[Denies] : Denies [N/A] : N/A [Yes] : Yes [24g] : 24g [IV discontinued. Intact. No signs or symptoms of IV complications noted. Time: ___] : IV discontinued. Intact. No signs or symptoms of IV complications noted. Time: [unfilled] [Patient  instructed to seek medical attention with signs and symptoms of adverse effects] : Patient  instructed to seek medical attention with signs and symptoms of adverse effects [Patient left unit in no acute distress] : Patient left unit in no acute distress [Medications administered as ordered and tolerated well.] : Medications administered as ordered and tolerated well. [Start Time: ___] : Medication Start Time: [unfilled] [End Time: ___] : Medication End Time: [unfilled] [de-identified] : ambulate with walker [de-identified] : first dose [de-identified] : Patient presents for Reclast (Zoledronic) infusion in NAD. Patient denies any recent fever, infection, or surgery. Medication education provided; patient verbalized understanding and consented to today's infusion. No other symptoms or concerns were verbalized. Fall risk assessed and patient denies any recent falls. In conclusion, patient left the unit in [facility].  [de-identified] : right median cubital vein

## 2023-05-22 ENCOUNTER — APPOINTMENT (OUTPATIENT)
Dept: ORTHOPEDIC SURGERY | Facility: CLINIC | Age: 88
End: 2023-05-22
Payer: MEDICARE

## 2023-05-22 DIAGNOSIS — S32.592A OTHER SPECIFIED FRACTURE OF LEFT PUBIS, INITIAL ENCOUNTER FOR CLOSED FRACTURE: ICD-10-CM

## 2023-05-22 PROCEDURE — 99213 OFFICE O/P EST LOW 20 MIN: CPT

## 2023-05-22 PROCEDURE — 72170 X-RAY EXAM OF PELVIS: CPT

## 2023-05-29 PROBLEM — S32.592A FRACTURE OF MULTIPLE PUBIC RAMI, LEFT, CLOSED, INITIAL ENCOUNTER: Status: ACTIVE | Noted: 2023-02-06

## 2023-06-05 ENCOUNTER — RX RENEWAL (OUTPATIENT)
Age: 88
End: 2023-06-05

## 2023-06-13 ENCOUNTER — NON-APPOINTMENT (OUTPATIENT)
Age: 88
End: 2023-06-13

## 2023-06-14 ENCOUNTER — APPOINTMENT (OUTPATIENT)
Dept: GERIATRICS | Facility: CLINIC | Age: 88
End: 2023-06-14
Payer: MEDICARE

## 2023-06-14 VITALS
OXYGEN SATURATION: 99 % | SYSTOLIC BLOOD PRESSURE: 149 MMHG | HEART RATE: 91 BPM | BODY MASS INDEX: 22.21 KG/M2 | WEIGHT: 123 LBS | TEMPERATURE: 98.4 F | RESPIRATION RATE: 14 BRPM | DIASTOLIC BLOOD PRESSURE: 81 MMHG

## 2023-06-14 PROCEDURE — 99215 OFFICE O/P EST HI 40 MIN: CPT

## 2023-06-15 ENCOUNTER — LABORATORY RESULT (OUTPATIENT)
Age: 88
End: 2023-06-15

## 2023-06-15 NOTE — ASSESSMENT
[FreeTextEntry1] : advised close follow up; scheduled appt for Friday \par \par ELIZABETH CandelarioP-BC

## 2023-06-15 NOTE — REVIEW OF SYSTEMS
[As Noted in HPI] : as noted in HPI [Fever] : no fever [Chills] : no chills [Feeling Poorly] : not feeling poorly [Feeling Tired] : not feeling tired [Eyesight Problems] : no eyesight problems [Discharge From Eyes] : no purulent discharge from the eyes [Nasal Discharge] : no nasal discharge [Sore Throat] : no sore throat [Chest Pain] : no chest pain [Palpitations] : no palpitations [Shortness Of Breath] : no shortness of breath [Wheezing] : no wheezing [Cough] : no cough [SOB on Exertion] : no shortness of breath during exertion [Abdominal Pain] : no abdominal pain [Vomiting] : no vomiting [Constipation] : no constipation [Diarrhea] : no diarrhea [Dysuria] : no dysuria [Dizziness] : no dizziness

## 2023-06-15 NOTE — PHYSICAL EXAM
[Alert] : alert [No Acute Distress] : in no acute distress [Sclera] : the sclera and conjunctiva were normal [PERRL] : pupils were equal in size, round, and reactive to light [Normal Oral Mucosa] : normal oral mucosa [Normal Outer Ear/Nose] : the ears and nose were normal in appearance [Normal Appearance] : the appearance of the neck was normal [Both Tympanic Membranes Were Examined] : both tympanic membranes were normal [Supple] : the neck was supple [No Respiratory Distress] : no respiratory distress [No Acc Muscle Use] : no accessory muscle use [Respiration, Rhythm And Depth] : normal respiratory rhythm and effort [Auscultation Breath Sounds / Voice Sounds] : lungs were clear to auscultation bilaterally [Normal S1, S2] : normal S1 and S2 [Heart Rate And Rhythm] : heart rate was normal and rhythm regular [Pedal Pulses Normal] : the pedal pulses are present [Bowel Sounds] : normal bowel sounds [Abdomen Tenderness] : non-tender [Abdomen Soft] : soft [Cervical Lymph Nodes Enlarged Posterior Bilaterally] : posterior cervical [Cervical Lymph Nodes Enlarged Anterior Bilaterally] : anterior cervical, supraclavicular [No CVA Tenderness] : no CVA  tenderness [Normal Affect] : the affect was normal [Normal Mood] : the mood was normal [de-identified] : left foot swelling [de-identified] : left foot swollen, erythematous and warm to touch; open blister in between second & third toe; third toe more erythematous, swollen and painful to touch than other toes- no evidence of fungal infection

## 2023-06-15 NOTE — HISTORY OF PRESENT ILLNESS
[FreeTextEntry1] : MANJULA GE is an 86 yo woman with PMH of Alzheimer's dementia, GERD, HTN, hypothyroidism, and OA who presents for acute visit for evaluation of swollen foot. Patient is accompanied by her daughter who provided collateral history. \par \par Left foot swelling:\par -started last night\par -went to urgent care this morning; was prescribed clotrimazole-betamethasone cream for fungal infection on toes and was ordered for doxycycline 100mg bid for 7 days\par -foot is painful/difficult to put in a shoe; currently wearing a loose slipper \par -denies fever or chills \par -mental status and PO intake at baseline \par \par \par

## 2023-06-16 ENCOUNTER — APPOINTMENT (OUTPATIENT)
Dept: GERIATRICS | Facility: CLINIC | Age: 88
End: 2023-06-16
Payer: MEDICARE

## 2023-06-16 DIAGNOSIS — L03.116 CELLULITIS OF LEFT LOWER LIMB: ICD-10-CM

## 2023-06-16 DIAGNOSIS — F03.90 UNSPECIFIED DEMENTIA W/OUT BEHAVIORAL DISTURBANCE: ICD-10-CM

## 2023-06-16 PROCEDURE — 99214 OFFICE O/P EST MOD 30 MIN: CPT | Mod: 95

## 2023-06-16 NOTE — HISTORY OF PRESENT ILLNESS
[FreeTextEntry1] : MANJULA GE is an 88 yo woman with PMH of Alzheimer's dementia, GERD, HTN, hypothyroidism, and OA who presents for acute visit for follow up of left foot cellulitis. Patient is accompanied by her daughter who provided collateral history. \par \par Left foot swelling:\par -less painful \par -swelling has improved\par -RN at day program notes that foot is not warm to touch \par -denies fever or chills\par -mental status and PO intake at baseline \par -started last night\par -taking doxycycline as prescribed\par

## 2023-06-16 NOTE — PHYSICAL EXAM
[Alert] : alert [No Respiratory Distress] : no respiratory distress [No Acc Muscle Use] : no accessory muscle use [Respiration, Rhythm And Depth] : normal respiratory rhythm and effort [Normal Affect] : the affect was normal [Normal Mood] : the mood was normal [de-identified] : left foot is still swollen but much improved from initial assessment; RN states that foot is no longer painful to touch or warm

## 2023-06-16 NOTE — REASON FOR VISIT
[Family Member] : family member [Other Location: e.g. School (Enter Location, City,State)___] : at [unfilled], at the time of the visit. [Medical Office: ([facility])___] : at the medical office located in  [Other:____] : [unfilled] [Patient] : the patient [This encounter was initiated by telehealth (audio with video) and converted to telephone (audio only) due to technical difficulties.] : This encounter was initiated by telehealth (audio with video) and converted to telephone (audio only) due to technical difficulties. 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

## 2023-06-16 NOTE — ASSESSMENT
[FreeTextEntry1] : follow up with Dr. Conti in 1-2 months; available earlier prn \par \par Summer Keys, FNP-BC

## 2023-06-20 ENCOUNTER — LABORATORY RESULT (OUTPATIENT)
Age: 88
End: 2023-06-20

## 2023-08-29 ENCOUNTER — RX RENEWAL (OUTPATIENT)
Age: 88
End: 2023-08-29

## 2023-09-05 ENCOUNTER — APPOINTMENT (OUTPATIENT)
Dept: GERIATRICS | Facility: CLINIC | Age: 88
End: 2023-09-05
Payer: MEDICARE

## 2023-09-05 VITALS
HEIGHT: 62 IN | BODY MASS INDEX: 23.28 KG/M2 | TEMPERATURE: 97.3 F | RESPIRATION RATE: 16 BRPM | DIASTOLIC BLOOD PRESSURE: 92 MMHG | WEIGHT: 126.5 LBS | OXYGEN SATURATION: 98 % | SYSTOLIC BLOOD PRESSURE: 181 MMHG | HEART RATE: 76 BPM

## 2023-09-05 PROCEDURE — 99214 OFFICE O/P EST MOD 30 MIN: CPT

## 2023-09-05 PROCEDURE — 99497 ADVNCD CARE PLAN 30 MIN: CPT | Mod: 25

## 2023-09-06 LAB
ALBUMIN SERPL ELPH-MCNC: 4.6 G/DL
ALP BLD-CCNC: 82 U/L
ALT SERPL-CCNC: 8 U/L
ANION GAP SERPL CALC-SCNC: 10 MMOL/L
AST SERPL-CCNC: 19 U/L
BILIRUB SERPL-MCNC: <0.2 MG/DL
BUN SERPL-MCNC: 15 MG/DL
CALCIUM SERPL-MCNC: 9.9 MG/DL
CHLORIDE SERPL-SCNC: 100 MMOL/L
CHOLEST SERPL-MCNC: 254 MG/DL
CO2 SERPL-SCNC: 30 MMOL/L
CREAT SERPL-MCNC: 0.96 MG/DL
EGFR: 58 ML/MIN/1.73M2
ESTIMATED AVERAGE GLUCOSE: 114 MG/DL
GLUCOSE SERPL-MCNC: 97 MG/DL
HBA1C MFR BLD HPLC: 5.6 %
HCT VFR BLD CALC: 39.9 %
HDLC SERPL-MCNC: 72 MG/DL
HGB BLD-MCNC: 12.9 G/DL
LDLC SERPL CALC-MCNC: 152 MG/DL
MCHC RBC-ENTMCNC: 30 PG
MCHC RBC-ENTMCNC: 32.3 GM/DL
MCV RBC AUTO: 92.8 FL
NONHDLC SERPL-MCNC: 182 MG/DL
PLATELET # BLD AUTO: 281 K/UL
POTASSIUM SERPL-SCNC: 5 MMOL/L
PROT SERPL-MCNC: 7.6 G/DL
RBC # BLD: 4.3 M/UL
RBC # FLD: 13.4 %
SODIUM SERPL-SCNC: 140 MMOL/L
TRIGL SERPL-MCNC: 173 MG/DL
TSH SERPL-ACNC: 2.46 UIU/ML
WBC # FLD AUTO: 5.4 K/UL

## 2023-09-06 RX ORDER — LEVOTHYROXINE SODIUM 0.03 MG/1
25 TABLET ORAL
Qty: 90 | Refills: 3 | Status: ACTIVE | COMMUNITY
Start: 2018-02-15 | End: 1900-01-01

## 2023-09-06 RX ORDER — TROSPIUM CHLORIDE 20 MG/1
20 TABLET, FILM COATED ORAL
Qty: 90 | Refills: 3 | Status: ACTIVE | COMMUNITY
Start: 2020-02-27 | End: 1900-01-01

## 2023-09-06 RX ORDER — AMLODIPINE BESYLATE 5 MG/1
5 TABLET ORAL
Qty: 90 | Refills: 3 | Status: ACTIVE | COMMUNITY
Start: 2023-03-15 | End: 1900-01-01

## 2023-09-06 RX ORDER — DONEPEZIL HYDROCHLORIDE 10 MG/1
10 TABLET ORAL
Qty: 90 | Refills: 3 | Status: ACTIVE | COMMUNITY
Start: 2018-02-15 | End: 1900-01-01

## 2023-09-06 RX ORDER — MEMANTINE HYDROCHLORIDE 10 MG/1
10 TABLET, FILM COATED ORAL
Qty: 180 | Refills: 3 | Status: ACTIVE | COMMUNITY
Start: 2018-02-15 | End: 1900-01-01

## 2023-09-06 RX ORDER — OMEPRAZOLE 20 MG/1
20 CAPSULE, DELAYED RELEASE ORAL
Qty: 90 | Refills: 3 | Status: ACTIVE | COMMUNITY
Start: 2022-08-23 | End: 1900-01-01

## 2023-09-06 NOTE — HISTORY OF PRESENT ILLNESS
[No falls in past year] : Patient reported no falls in the past year [Independent] : transferring/mobility [Completely Dependent] : Completely dependent. [0] : 2) Feeling down, depressed, or hopeless: Not at all (0) [PHQ-2 Negative - No further assessment needed] : PHQ-2 Negative - No further assessment needed [Moderate] : Stage: Moderate [Worse] : Status: Worse [Memory Lapses Or Loss] : worsened memory impairment [Patient Observed To Be Agitated] : worsened agitation [Hostility Toward Caregivers] : worsened aggression [Sleep Disturbances] : denies sleep disturbances [] : worsened wandering [Fixed Beliefs Contradicted By Reality (Delusions)] : worsened delusions [FreeTextEntry1] : 88 yo female with dementia and goes to [employer] [facility] by bus.  An incident occured at day program with bus and seating. Pt was to be seatbelted in by  but wasnt and pt somehow "escaped" and wouldnt get back on bus. Pt no longer going on that transportation.  Pt with increasing Paranoia Sundowning around 4-5 pm with agitation and being angry. Dtr describes regular agitation and paranoia, blaming others, annoyed.  Pt has been followed by orthopedist since pelvic fracture. Pt uses a cane to walk. Has a limp since pelvic fracture.    Pt having episodes of incontinence at night, but not all the time. The trospium does seem to help. We discussed pros and cons of continuing the medicine (dtr is pharmacist) .    No falls. Pt sleeping. Pt lives with her dtr. [[id number]] : 0 [de-identified] : bus ride incident, escaped the bus and wouldnt get back on around 4 pm leaving day program

## 2023-09-06 NOTE — HISTORY OF PRESENT ILLNESS
[No falls in past year] : Patient reported no falls in the past year [Independent] : transferring/mobility [Completely Dependent] : Completely dependent. [0] : 2) Feeling down, depressed, or hopeless: Not at all (0) [PHQ-2 Negative - No further assessment needed] : PHQ-2 Negative - No further assessment needed [Moderate] : Stage: Moderate [Worse] : Status: Worse [Memory Lapses Or Loss] : worsened memory impairment [Patient Observed To Be Agitated] : worsened agitation [Hostility Toward Caregivers] : worsened aggression [Sleep Disturbances] : denies sleep disturbances [] : worsened wandering [Fixed Beliefs Contradicted By Reality (Delusions)] : worsened delusions [FreeTextEntry1] : 88 yo female with dementia and goes to [employer] [facility] by bus.  An incident occured at day program with bus and seating. Pt was to be seatbelted in by  but wasnt and pt somehow "escaped" and wouldnt get back on bus. Pt no longer going on that transportation.  Pt with increasing Paranoia Sundowning around 4-5 pm with agitation and being angry. Dtr describes regular agitation and paranoia, blaming others, annoyed.  Pt has been followed by orthopedist since pelvic fracture. Pt uses a cane to walk. Has a limp since pelvic fracture.    Pt having episodes of incontinence at night, but not all the time. The trospium does seem to help. We discussed pros and cons of continuing the medicine (dtr is pharmacist) .    No falls. Pt sleeping. Pt lives with her dtr. [DNV2Bmand] : 0 [de-identified] : bus ride incident, escaped the bus and wouldnt get back on around 4 pm leaving day program

## 2023-09-06 NOTE — REVIEW OF SYSTEMS
[Incontinence] : incontinence [Confused] : confusion [Difficulty Walking] : difficulty walking [As Noted in HPI] : as noted in HPI [Negative] : Heme/Lymph [FreeTextEntry9] : limp with walking [de-identified] : smita [de-identified] : kevin

## 2023-09-06 NOTE — REVIEW OF SYSTEMS
[Incontinence] : incontinence [Confused] : confusion [Difficulty Walking] : difficulty walking [As Noted in HPI] : as noted in HPI [Negative] : Heme/Lymph [FreeTextEntry9] : limp with walking [de-identified] : smita [de-identified] : kevin

## 2023-09-08 NOTE — GOALS
[Patient] : patient [DNR/DNI] : DNR/DNI [MOLST Completed] : MOLST Completed [Time Spent: ___ minutes] : I, personally, spent [unfilled] minutes on advance care planning services with the patient.  This time is separate and distinct from any other care management services provided on this date [FreeTextEntry2] : DTR [FreeTextEntry3] : [id number] [FreeTextEntry7] : 86 yo Canadian speaking pt with advanced dementia, less verbal, with increasing agitation, sundowning and behavioral issues - paranoia. Pt escaped bus transport on way home from day program.  Pt was hospitalized in past year at [facility] for pelvic fracture following a fall.  Pt during [facility] completed MOLST form wiht dtr. Pt is DNR and DNI.  PINK MOLST form is " home on refrigerator"  Goals for family are to keep her as safe as possible, home and not hosptialized if able, comfortable.  Son lives in [place] is also very involved. Dtr to fax HCP and MOLST form to office. [phone] [LastACPVerDate] : 09/05/2023

## 2023-09-08 NOTE — GOALS
[Patient] : patient [DNR/DNI] : DNR/DNI [MOLST Completed] : MOLST Completed [Time Spent: ___ minutes] : I, personally, spent [unfilled] minutes on advance care planning services with the patient.  This time is separate and distinct from any other care management services provided on this date [FreeTextEntry2] : DTR [FreeTextEntry3] : [id number] [FreeTextEntry7] : 88 yo Togolese speaking pt with advanced dementia, less verbal, with increasing agitation, sundowning and behavioral issues - paranoia. Pt escaped bus transport on way home from day program.  Pt was hospitalized in past year at [facility] for pelvic fracture following a fall.  Pt during [facility] completed MOLST form wiht dtr. Pt is DNR and DNI.  PINK MOLST form is " home on refrigerator"  Goals for family are to keep her as safe as possible, home and not hosptialized if able, comfortable.  Son lives in [place] is also very involved. Dtr to fax HCP and MOLST form to office. [phone] [LastACPVerDate] : 09/05/2023

## 2024-02-27 ENCOUNTER — APPOINTMENT (OUTPATIENT)
Dept: GERIATRICS | Facility: CLINIC | Age: 89
End: 2024-02-27
Payer: MEDICARE

## 2024-02-27 VITALS
WEIGHT: 126 LBS | BODY MASS INDEX: 23.19 KG/M2 | OXYGEN SATURATION: 98 % | TEMPERATURE: 97.3 F | HEART RATE: 80 BPM | SYSTOLIC BLOOD PRESSURE: 147 MMHG | HEIGHT: 62 IN | DIASTOLIC BLOOD PRESSURE: 87 MMHG

## 2024-02-27 DIAGNOSIS — I10 ESSENTIAL (PRIMARY) HYPERTENSION: ICD-10-CM

## 2024-02-27 DIAGNOSIS — E03.9 HYPOTHYROIDISM, UNSPECIFIED: ICD-10-CM

## 2024-02-27 DIAGNOSIS — M19.90 UNSPECIFIED OSTEOARTHRITIS, UNSPECIFIED SITE: ICD-10-CM

## 2024-02-27 DIAGNOSIS — G30.9 ALZHEIMER'S DISEASE, UNSPECIFIED: ICD-10-CM

## 2024-02-27 DIAGNOSIS — F32.A ANXIETY DISORDER, UNSPECIFIED: ICD-10-CM

## 2024-02-27 DIAGNOSIS — F41.9 ANXIETY DISORDER, UNSPECIFIED: ICD-10-CM

## 2024-02-27 DIAGNOSIS — F02.811 ALZHEIMER'S DISEASE, UNSPECIFIED: ICD-10-CM

## 2024-02-27 DIAGNOSIS — K21.9 GASTRO-ESOPHAGEAL REFLUX DISEASE W/OUT ESOPHAGITIS: ICD-10-CM

## 2024-02-27 PROCEDURE — 99214 OFFICE O/P EST MOD 30 MIN: CPT

## 2024-02-27 RX ORDER — ESCITALOPRAM OXALATE 10 MG/1
10 TABLET ORAL
Qty: 135 | Refills: 3 | Status: ACTIVE | COMMUNITY
Start: 2021-01-12 | End: 1900-01-01

## 2024-02-27 RX ORDER — QUETIAPINE FUMARATE 25 MG/1
25 TABLET ORAL
Qty: 90 | Refills: 2 | Status: ACTIVE | COMMUNITY
Start: 2024-02-27 | End: 1900-01-01

## 2024-02-27 NOTE — HISTORY OF PRESENT ILLNESS
[FreeTextEntry1] : 87 yo female with dementia and some behavioral issues. 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 Pt at home having more agitation and irritability. Her agitation is unpredictable. pt wants to stay in bed more often. More sedated. Pt goes to [facility] 4 times a week. Home attendant has been threatened with cane. Pt more lethargic in am, doesnt want to ge tout of bed.   Pt 24 hour care wiht aide and dtr. INcontinence and pt wakes up twice a night.  Blood presure readings : 110-130s/70 mainly in 130s [No falls in past year] : Patient reported no falls in the past year [Completely Dependent] : Completely dependent.

## 2024-03-01 LAB
25(OH)D3 SERPL-MCNC: 97.7 NG/ML
ALBUMIN SERPL ELPH-MCNC: 4.3 G/DL
ALP BLD-CCNC: 63 U/L
ALT SERPL-CCNC: 9 U/L
ANION GAP SERPL CALC-SCNC: 11 MMOL/L
AST SERPL-CCNC: 17 U/L
BASOPHILS # BLD AUTO: 0.07 K/UL
BASOPHILS NFR BLD AUTO: 1.2 %
BILIRUB SERPL-MCNC: <0.2 MG/DL
BUN SERPL-MCNC: 19 MG/DL
CALCIUM SERPL-MCNC: 9.1 MG/DL
CHLORIDE SERPL-SCNC: 100 MMOL/L
CO2 SERPL-SCNC: 27 MMOL/L
CREAT SERPL-MCNC: 1.23 MG/DL
EGFR: 43 ML/MIN/1.73M2
EOSINOPHIL # BLD AUTO: 0.38 K/UL
EOSINOPHIL NFR BLD AUTO: 6.7 %
FOLATE SERPL-MCNC: 6.7 NG/ML
GLUCOSE SERPL-MCNC: 92 MG/DL
HCT VFR BLD CALC: 37.2 %
HGB BLD-MCNC: 12 G/DL
IMM GRANULOCYTES NFR BLD AUTO: 0.2 %
LYMPHOCYTES # BLD AUTO: 2.4 K/UL
LYMPHOCYTES NFR BLD AUTO: 42 %
MAN DIFF?: NORMAL
MCHC RBC-ENTMCNC: 29.3 PG
MCHC RBC-ENTMCNC: 32.3 GM/DL
MCV RBC AUTO: 91 FL
MONOCYTES # BLD AUTO: 0.51 K/UL
MONOCYTES NFR BLD AUTO: 8.9 %
NEUTROPHILS # BLD AUTO: 2.34 K/UL
NEUTROPHILS NFR BLD AUTO: 41 %
PLATELET # BLD AUTO: 295 K/UL
POTASSIUM SERPL-SCNC: 4.2 MMOL/L
PROT SERPL-MCNC: 7.1 G/DL
RBC # BLD: 4.09 M/UL
RBC # FLD: 13.7 %
SODIUM SERPL-SCNC: 138 MMOL/L
TSH SERPL-ACNC: 2.01 UIU/ML
VIT B12 SERPL-MCNC: 728 PG/ML
WBC # FLD AUTO: 5.71 K/UL

## 2024-03-20 ENCOUNTER — APPOINTMENT (OUTPATIENT)
Dept: ENDOCRINOLOGY | Facility: CLINIC | Age: 89
End: 2024-03-20
Payer: MEDICARE

## 2024-03-20 VITALS
BODY MASS INDEX: 25.04 KG/M2 | WEIGHT: 124 LBS | DIASTOLIC BLOOD PRESSURE: 80 MMHG | HEART RATE: 91 BPM | OXYGEN SATURATION: 95 % | SYSTOLIC BLOOD PRESSURE: 140 MMHG

## 2024-03-20 VITALS — BODY MASS INDEX: 25 KG/M2 | WEIGHT: 124 LBS | HEIGHT: 59 IN

## 2024-03-20 DIAGNOSIS — M81.0 AGE-RELATED OSTEOPOROSIS W/OUT CURRENT PATHOLOGICAL FRACTURE: ICD-10-CM

## 2024-03-20 PROCEDURE — 77080 DXA BONE DENSITY AXIAL: CPT

## 2024-03-20 PROCEDURE — 99213 OFFICE O/P EST LOW 20 MIN: CPT

## 2024-03-20 PROCEDURE — ZZZZZ: CPT

## 2024-03-20 PROCEDURE — G2211 COMPLEX E/M VISIT ADD ON: CPT

## 2024-03-21 PROBLEM — M81.0 OSTEOPOROSIS, POST-MENOPAUSAL: Status: ACTIVE | Noted: 2023-03-14

## 2024-03-21 RX ADMIN — ZOLEDRONIC ACID 5 MG/100ML: 5 INJECTION, SOLUTION INTRAVENOUS at 00:00

## 2024-03-21 NOTE — HISTORY OF PRESENT ILLNESS
[FreeTextEntry1] : 87-year-old female referred for management of osteoporosis. Medical history is notable for progressive dementia.  Patient has been told of low bone density for many years.  She took Fosamax in the past for many years although the details are not available.  She apparently has been off Fosamax for at least 10 years.  Prior  pelvic fracture- left sided pubic ramus fracture and sacral fracture after a fall.  She denies other previous fractures. Had first dose of Reclast 3 /2023.  Patient's daughter reports that the patient has had no problems with Reclast, tolerating well. No interval fracture  BMD 03/2024 shows stable osteoporosis in the spine and prox radius and stable osteopenia in the total hip and fem neck compared to 2023 in response to medication.  Labs 02/2024 VitD 97, high  2. Endocrine history is notable for hypothyroidism for approximately 15 years currently on very low-dose Synthroid 25 mcg daily.  If patient appears to be clinically euthyroid on this low-dose.  3. Patient has wet macular degeneration in left eye, no injection from ophthalmologist, following up with ophthalmologist according to patient's daughter.

## 2024-03-21 NOTE — PHYSICAL EXAM
[Alert] : alert [No Acute Distress] : no acute distress [Well Nourished] : well nourished [Well Developed] : well developed [EOMI] : extra ocular movement intact [Normal Sclera/Conjunctiva] : normal sclera/conjunctiva [No Proptosis] : no proptosis [Normal Oropharynx] : the oropharynx was normal [Thyroid Not Enlarged] : the thyroid was not enlarged [No Thyroid Nodules] : no palpable thyroid nodules [No Respiratory Distress] : no respiratory distress [No Accessory Muscle Use] : no accessory muscle use [Clear to Auscultation] : lungs were clear to auscultation bilaterally [Normal S1, S2] : normal S1 and S2 [Normal Rate] : heart rate was normal [Regular Rhythm] : with a regular rhythm [No Edema] : no peripheral edema [Normal Bowel Sounds] : normal bowel sounds [Pedal Pulses Normal] : the pedal pulses are present [Not Tender] : non-tender [Not Distended] : not distended [Soft] : abdomen soft [Normal Anterior Cervical Nodes] : no anterior cervical lymphadenopathy [No Spinal Tenderness] : no spinal tenderness [No Stigmata of Cushings Syndrome] : no stigmata of Cushings Syndrome [Kyphosis] : kyphosis present [Normal Gait] : normal gait [Normal Strength/Tone] : muscle strength and tone were normal [No Rash] : no rash [Acanthosis Nigricans] : no acanthosis nigricans [de-identified] : missing upper molars

## 2024-03-21 NOTE — REASON FOR VISIT
[Consultation] : a consultation visit [Osteoporosis] : osteoporosis [Formal Caregiver] : formal caregiver [Source: ______] : History obtained from STEVAN [Family Member] : family member [Follow - Up] : a follow-up visit [FreeTextEntry2] : Laureen Conti MD

## 2024-03-21 NOTE — END OF VISIT
[FreeTextEntry3] :  I, Caro Dent, authored this note working as a medical scribe for Dr. Israel.  03/20/2024 .  This note was authored by the medical scribe for me. I have reviewed, edited, and revised the note as needed. I am in agreement with the exam findings, imaging findings, and treatment plan.  Keny Israel MD

## 2024-03-21 NOTE — ASSESSMENT
[Bisphosphonate Therapy] : Risks and benefits of bisphosphonate therapy were  discussed with the patient including gastroesophageal irritation, osteonecrosis of the jaw, and atypical femur fractures, and acute phase reaction [FreeTextEntry1] : 1. 88-year-old female presents with progressive osteoporosis with recent pelvic fracture.  Bone density 2023 shows decline in spine versus prior study. Routine work-up for secondary causes have been unremarkable. Options of therapy were discussed in great detail with patient.  I have recommended intravenous Reclast as the most efficient treatment given the patient's progressive dementia and potential issues with standard medication. The patient has significant history of acid reflux and may not be a candidate for standard therapy and may not be able to report adverse effects. Risks and benefits reviewed. Recommended calcium 500 mg per day, vitamin D. 1000 units per day, in addition to dietary intake.  Had first dose of Reclast 3/2023. BMD 03/2024 shows stable osteoporosis in the spine and prox radius and stable osteopenia in the total hip and fem neck compared to 2023 in response to medication.  I have discussed with the patient's daughter potential alternative options for treatment. I recommend another dose of IV Reclast at this time. Patient's daughter elects for patient to start Reclast and has signed informed consent in stead of the patient.   2. History of hypothyroidism for many years clinically euthyroid on low-dose levothyroxine 25 mcg/day.  Last TSH normal 1.95.  F/u in 1 year for BMD to assess response to medication

## 2024-03-21 NOTE — REVIEW OF SYSTEMS
[As Noted in HPI] : as noted in HPI [All other systems negative] : All other systems negative [de-identified] : Progressive dementia

## 2024-03-21 NOTE — PROCEDURE
[FreeTextEntry1] : BMD 03/20/2024 compared to 2023, assess response to medication Total Hip: -1.8, osteopenia, ns Spine: -3.4, osteoporosis, ns Femoral Neck: -2.4, osteopenia, ns Forearm: -3.4, osteoporosis, ns  Bone mineral density March 13, 2023 Compared to 2022 Indication new fractures Spine -3.3 osteoporosis prior report -2.9  total hip -1.9 osteopenia prior report -2.1 Femoral neck -2.5 osteoporosis prior report -2.7 Proximal radius -3.4 osteoporosis no prior report

## 2024-05-15 ENCOUNTER — APPOINTMENT (OUTPATIENT)
Dept: RHEUMATOLOGY | Facility: CLINIC | Age: 89
End: 2024-05-15
Payer: MEDICARE

## 2024-05-15 VITALS
OXYGEN SATURATION: 95 % | TEMPERATURE: 97.6 F | SYSTOLIC BLOOD PRESSURE: 158 MMHG | DIASTOLIC BLOOD PRESSURE: 84 MMHG | RESPIRATION RATE: 16 BRPM | HEART RATE: 85 BPM

## 2024-05-15 VITALS — SYSTOLIC BLOOD PRESSURE: 136 MMHG | HEART RATE: 83 BPM | DIASTOLIC BLOOD PRESSURE: 83 MMHG

## 2024-05-15 PROCEDURE — 96374 THER/PROPH/DIAG INJ IV PUSH: CPT

## 2024-05-15 RX ORDER — ZOLEDRONIC ACID 5 MG/100ML
5 INJECTION INTRAVENOUS
Qty: 0 | Refills: 0 | Status: COMPLETED | OUTPATIENT
Start: 2024-03-21

## 2024-07-08 ENCOUNTER — APPOINTMENT (OUTPATIENT)
Dept: GERIATRICS | Facility: CLINIC | Age: 89
End: 2024-07-08
Payer: MEDICARE

## 2024-07-08 DIAGNOSIS — U07.1 COVID-19: ICD-10-CM

## 2024-07-08 PROCEDURE — 99447 NTRPROF PH1/NTRNET/EHR 11-20: CPT

## 2024-07-08 RX ORDER — NIRMATRELVIR AND RITONAVIR 150-100 MG
10 X 150 MG & KIT ORAL
Qty: 1 | Refills: 0 | Status: ACTIVE | COMMUNITY
Start: 2024-07-08 | End: 1900-01-01

## 2024-07-20 ENCOUNTER — NON-APPOINTMENT (OUTPATIENT)
Age: 89
End: 2024-07-20

## 2024-08-05 NOTE — PHYSICAL THERAPY INITIAL EVALUATION ADULT - SHORT TERM MEMORY, REHAB EVAL
Breast Problem    Assessment/Plan:   Cristine Randy (AJ), In Office.      Subjective:       Cristine Hopson impaired

## 2024-08-16 ENCOUNTER — RX RENEWAL (OUTPATIENT)
Age: 89
End: 2024-08-16

## 2024-08-27 ENCOUNTER — APPOINTMENT (OUTPATIENT)
Dept: GERIATRICS | Facility: CLINIC | Age: 89
End: 2024-08-27

## 2024-09-10 ENCOUNTER — RX RENEWAL (OUTPATIENT)
Age: 89
End: 2024-09-10

## 2024-09-12 ENCOUNTER — RX RENEWAL (OUTPATIENT)
Age: 89
End: 2024-09-12

## 2024-09-12 ENCOUNTER — APPOINTMENT (OUTPATIENT)
Dept: GERIATRICS | Facility: CLINIC | Age: 89
End: 2024-09-12
Payer: MEDICARE

## 2024-09-12 VITALS
HEIGHT: 59 IN | HEART RATE: 89 BPM | SYSTOLIC BLOOD PRESSURE: 151 MMHG | BODY MASS INDEX: 25.88 KG/M2 | WEIGHT: 128.38 LBS | TEMPERATURE: 97.3 F | OXYGEN SATURATION: 97 % | DIASTOLIC BLOOD PRESSURE: 88 MMHG

## 2024-09-12 DIAGNOSIS — F41.9 ANXIETY DISORDER, UNSPECIFIED: ICD-10-CM

## 2024-09-12 DIAGNOSIS — F02.811 ALZHEIMER'S DISEASE, UNSPECIFIED: ICD-10-CM

## 2024-09-12 DIAGNOSIS — E03.9 HYPOTHYROIDISM, UNSPECIFIED: ICD-10-CM

## 2024-09-12 DIAGNOSIS — F32.A ANXIETY DISORDER, UNSPECIFIED: ICD-10-CM

## 2024-09-12 DIAGNOSIS — I10 ESSENTIAL (PRIMARY) HYPERTENSION: ICD-10-CM

## 2024-09-12 DIAGNOSIS — G30.9 ALZHEIMER'S DISEASE, UNSPECIFIED: ICD-10-CM

## 2024-09-12 DIAGNOSIS — K21.9 GASTRO-ESOPHAGEAL REFLUX DISEASE W/OUT ESOPHAGITIS: ICD-10-CM

## 2024-09-12 PROCEDURE — G2211 COMPLEX E/M VISIT ADD ON: CPT

## 2024-09-12 PROCEDURE — 99214 OFFICE O/P EST MOD 30 MIN: CPT

## 2024-09-12 PROCEDURE — G0444 DEPRESSION SCREEN ANNUAL: CPT | Mod: 59

## 2024-09-13 NOTE — HISTORY OF PRESENT ILLNESS
[FreeTextEntry1] : 87 yo pt here for followup. Pt had gone to [facility] in July 2024 found to have covid. Pt was very lethargic. Treated with paxlovid. Pt then had swelling right leg and had US of legs node and found to be negative for a DVT.  Pt doing adult day care. and has 6 days of care at home. No new events. Gained some weight .   No falls.  No new complaints or issues [No falls in past year] : Patient reported no falls in the past year [Independent] : transferring/mobility [] : Patient is continent. [Completely Dependent] : Completely dependent. [0] : 2) Feeling down, depressed, or hopeless: Not at all (0) [PHQ-2 Negative - No further assessment needed] : PHQ-2 Negative - No further assessment needed [[id number]] : 0

## 2024-09-13 NOTE — HISTORY OF PRESENT ILLNESS
[FreeTextEntry1] : 89 yo pt here for followup. Pt had gone to [facility] in July 2024 found to have covid. Pt was very lethargic. Treated with paxlovid. Pt then had swelling right leg and had US of legs node and found to be negative for a DVT.  Pt doing adult day care. and has 6 days of care at home. No new events. Gained some weight .   No falls.  No new complaints or issues [No falls in past year] : Patient reported no falls in the past year [Independent] : transferring/mobility [] : Patient is continent. [Completely Dependent] : Completely dependent. [0] : 2) Feeling down, depressed, or hopeless: Not at all (0) [PHQ-2 Negative - No further assessment needed] : PHQ-2 Negative - No further assessment needed [[id number]] : 0

## 2024-10-31 ENCOUNTER — RX RENEWAL (OUTPATIENT)
Age: 89
End: 2024-10-31

## 2024-11-08 ENCOUNTER — NON-APPOINTMENT (OUTPATIENT)
Age: 89
End: 2024-11-08

## 2024-11-08 ENCOUNTER — INPATIENT (INPATIENT)
Facility: HOSPITAL | Age: 89
LOS: 4 days | Discharge: NURSING, REHAB OR LONG-TERM CARE | DRG: 488 | End: 2024-11-13
Attending: HOSPITALIST | Admitting: INTERNAL MEDICINE
Payer: MEDICARE

## 2024-11-08 VITALS
RESPIRATION RATE: 18 BRPM | TEMPERATURE: 98 F | DIASTOLIC BLOOD PRESSURE: 86 MMHG | OXYGEN SATURATION: 100 % | SYSTOLIC BLOOD PRESSURE: 188 MMHG | HEART RATE: 90 BPM | HEIGHT: 66 IN

## 2024-11-08 DIAGNOSIS — S82.009A UNSPECIFIED FRACTURE OF UNSPECIFIED PATELLA, INITIAL ENCOUNTER FOR CLOSED FRACTURE: ICD-10-CM

## 2024-11-08 LAB
ABO RH CONFIRMATION: SIGNIFICANT CHANGE UP
ALBUMIN SERPL ELPH-MCNC: 3.6 G/DL — SIGNIFICANT CHANGE UP (ref 3.3–5)
ALP SERPL-CCNC: 61 U/L — SIGNIFICANT CHANGE UP (ref 40–120)
ALT FLD-CCNC: 14 U/L — SIGNIFICANT CHANGE UP (ref 12–78)
ANION GAP SERPL CALC-SCNC: 5 MMOL/L — SIGNIFICANT CHANGE UP (ref 5–17)
APTT BLD: 32.5 SEC — SIGNIFICANT CHANGE UP (ref 24.5–35.6)
AST SERPL-CCNC: 20 U/L — SIGNIFICANT CHANGE UP (ref 15–37)
BASOPHILS # BLD AUTO: 0.04 K/UL — SIGNIFICANT CHANGE UP (ref 0–0.2)
BASOPHILS NFR BLD AUTO: 0.5 % — SIGNIFICANT CHANGE UP (ref 0–2)
BILIRUB SERPL-MCNC: 0.3 MG/DL — SIGNIFICANT CHANGE UP (ref 0.2–1.2)
BLD GP AB SCN SERPL QL: SIGNIFICANT CHANGE UP
BUN SERPL-MCNC: 16 MG/DL — SIGNIFICANT CHANGE UP (ref 7–23)
CALCIUM SERPL-MCNC: 9.2 MG/DL — SIGNIFICANT CHANGE UP (ref 8.5–10.1)
CHLORIDE SERPL-SCNC: 104 MMOL/L — SIGNIFICANT CHANGE UP (ref 96–108)
CO2 SERPL-SCNC: 29 MMOL/L — SIGNIFICANT CHANGE UP (ref 22–31)
CREAT SERPL-MCNC: 0.96 MG/DL — SIGNIFICANT CHANGE UP (ref 0.5–1.3)
EGFR: 57 ML/MIN/1.73M2 — LOW
EOSINOPHIL # BLD AUTO: 0.01 K/UL — SIGNIFICANT CHANGE UP (ref 0–0.5)
EOSINOPHIL NFR BLD AUTO: 0.1 % — SIGNIFICANT CHANGE UP (ref 0–6)
GLUCOSE SERPL-MCNC: 115 MG/DL — HIGH (ref 70–99)
HCT VFR BLD CALC: 37.8 % — SIGNIFICANT CHANGE UP (ref 34.5–45)
HGB BLD-MCNC: 12.8 G/DL — SIGNIFICANT CHANGE UP (ref 11.5–15.5)
IMM GRANULOCYTES NFR BLD AUTO: 0.4 % — SIGNIFICANT CHANGE UP (ref 0–0.9)
INR BLD: 1.01 RATIO — SIGNIFICANT CHANGE UP (ref 0.85–1.16)
LYMPHOCYTES # BLD AUTO: 0.69 K/UL — LOW (ref 1–3.3)
LYMPHOCYTES # BLD AUTO: 8.7 % — LOW (ref 13–44)
MCHC RBC-ENTMCNC: 29.9 PG — SIGNIFICANT CHANGE UP (ref 27–34)
MCHC RBC-ENTMCNC: 33.9 G/DL — SIGNIFICANT CHANGE UP (ref 32–36)
MCV RBC AUTO: 88.3 FL — SIGNIFICANT CHANGE UP (ref 80–100)
MONOCYTES # BLD AUTO: 0.17 K/UL — SIGNIFICANT CHANGE UP (ref 0–0.9)
MONOCYTES NFR BLD AUTO: 2.1 % — SIGNIFICANT CHANGE UP (ref 2–14)
NEUTROPHILS # BLD AUTO: 7.02 K/UL — SIGNIFICANT CHANGE UP (ref 1.8–7.4)
NEUTROPHILS NFR BLD AUTO: 88.2 % — HIGH (ref 43–77)
PLATELET # BLD AUTO: 263 K/UL — SIGNIFICANT CHANGE UP (ref 150–400)
POTASSIUM SERPL-MCNC: 3.5 MMOL/L — SIGNIFICANT CHANGE UP (ref 3.5–5.3)
POTASSIUM SERPL-SCNC: 3.5 MMOL/L — SIGNIFICANT CHANGE UP (ref 3.5–5.3)
PROT SERPL-MCNC: 7.8 GM/DL — SIGNIFICANT CHANGE UP (ref 6–8.3)
PROTHROM AB SERPL-ACNC: 11.9 SEC — SIGNIFICANT CHANGE UP (ref 9.9–13.4)
RBC # BLD: 4.28 M/UL — SIGNIFICANT CHANGE UP (ref 3.8–5.2)
RBC # FLD: 13.4 % — SIGNIFICANT CHANGE UP (ref 10.3–14.5)
SODIUM SERPL-SCNC: 138 MMOL/L — SIGNIFICANT CHANGE UP (ref 135–145)
TROPONIN I, HIGH SENSITIVITY RESULT: 108.59 NG/L — HIGH
TROPONIN I, HIGH SENSITIVITY RESULT: 116.48 NG/L — HIGH
WBC # BLD: 7.96 K/UL — SIGNIFICANT CHANGE UP (ref 3.8–10.5)
WBC # FLD AUTO: 7.96 K/UL — SIGNIFICANT CHANGE UP (ref 3.8–10.5)

## 2024-11-08 PROCEDURE — 73562 X-RAY EXAM OF KNEE 3: CPT | Mod: 26,RT

## 2024-11-08 PROCEDURE — 99223 1ST HOSP IP/OBS HIGH 75: CPT

## 2024-11-08 PROCEDURE — 71045 X-RAY EXAM CHEST 1 VIEW: CPT | Mod: 26

## 2024-11-08 PROCEDURE — 70450 CT HEAD/BRAIN W/O DYE: CPT | Mod: 26,MC

## 2024-11-08 PROCEDURE — 72125 CT NECK SPINE W/O DYE: CPT | Mod: 26,MC

## 2024-11-08 PROCEDURE — 76376 3D RENDER W/INTRP POSTPROCES: CPT | Mod: 26

## 2024-11-08 PROCEDURE — 73562 X-RAY EXAM OF KNEE 3: CPT | Mod: RT

## 2024-11-08 PROCEDURE — 99285 EMERGENCY DEPT VISIT HI MDM: CPT

## 2024-11-08 PROCEDURE — 83735 ASSAY OF MAGNESIUM: CPT

## 2024-11-08 PROCEDURE — 73590 X-RAY EXAM OF LOWER LEG: CPT | Mod: RT

## 2024-11-08 PROCEDURE — 86850 RBC ANTIBODY SCREEN: CPT

## 2024-11-08 PROCEDURE — 73562 X-RAY EXAM OF KNEE 3: CPT | Mod: 26,RT,77

## 2024-11-08 PROCEDURE — 80048 BASIC METABOLIC PNL TOTAL CA: CPT

## 2024-11-08 PROCEDURE — 85610 PROTHROMBIN TIME: CPT

## 2024-11-08 PROCEDURE — 97166 OT EVAL MOD COMPLEX 45 MIN: CPT | Mod: GO

## 2024-11-08 PROCEDURE — 84484 ASSAY OF TROPONIN QUANT: CPT

## 2024-11-08 PROCEDURE — 93005 ELECTROCARDIOGRAM TRACING: CPT

## 2024-11-08 PROCEDURE — 97530 THERAPEUTIC ACTIVITIES: CPT | Mod: GP

## 2024-11-08 PROCEDURE — 97162 PT EVAL MOD COMPLEX 30 MIN: CPT | Mod: GP

## 2024-11-08 PROCEDURE — 73552 X-RAY EXAM OF FEMUR 2/>: CPT | Mod: 26,RT

## 2024-11-08 PROCEDURE — 76000 FLUOROSCOPY <1 HR PHYS/QHP: CPT

## 2024-11-08 PROCEDURE — 85730 THROMBOPLASTIN TIME PARTIAL: CPT

## 2024-11-08 PROCEDURE — 97116 GAIT TRAINING THERAPY: CPT | Mod: GP

## 2024-11-08 PROCEDURE — 86900 BLOOD TYPING SEROLOGIC ABO: CPT

## 2024-11-08 PROCEDURE — 73552 X-RAY EXAM OF FEMUR 2/>: CPT | Mod: RT

## 2024-11-08 PROCEDURE — 36415 COLL VENOUS BLD VENIPUNCTURE: CPT

## 2024-11-08 PROCEDURE — 73590 X-RAY EXAM OF LOWER LEG: CPT | Mod: 26,RT

## 2024-11-08 PROCEDURE — C1713: CPT

## 2024-11-08 PROCEDURE — 84100 ASSAY OF PHOSPHORUS: CPT

## 2024-11-08 PROCEDURE — 73700 CT LOWER EXTREMITY W/O DYE: CPT | Mod: 26,RT,MC

## 2024-11-08 PROCEDURE — 93010 ELECTROCARDIOGRAM REPORT: CPT

## 2024-11-08 PROCEDURE — 85027 COMPLETE CBC AUTOMATED: CPT

## 2024-11-08 PROCEDURE — 86901 BLOOD TYPING SEROLOGIC RH(D): CPT

## 2024-11-08 RX ORDER — INFLUENZ VIR VAC TV P-SURF2003 15MCG/.5ML
0.5 SYRINGE (ML) INTRAMUSCULAR ONCE
Refills: 0 | Status: DISCONTINUED | OUTPATIENT
Start: 2024-11-08 | End: 2024-11-13

## 2024-11-08 RX ORDER — ENOXAPARIN SODIUM 40MG/0.4ML
40 SYRINGE (ML) SUBCUTANEOUS ONCE
Refills: 0 | Status: COMPLETED | OUTPATIENT
Start: 2024-11-08 | End: 2024-11-08

## 2024-11-08 RX ORDER — MELATONIN 5 MG
3 TABLET ORAL AT BEDTIME
Refills: 0 | Status: DISCONTINUED | OUTPATIENT
Start: 2024-11-08 | End: 2024-11-13

## 2024-11-08 RX ORDER — ESCITALOPRAM 10 MG/1
15 TABLET, FILM COATED ORAL DAILY
Refills: 0 | Status: DISCONTINUED | OUTPATIENT
Start: 2024-11-08 | End: 2024-11-13

## 2024-11-08 RX ORDER — QUETIAPINE FUMARATE 200 MG/1
25 TABLET ORAL DAILY
Refills: 0 | Status: DISCONTINUED | OUTPATIENT
Start: 2024-11-08 | End: 2024-11-13

## 2024-11-08 RX ORDER — ACETAMINOPHEN 500 MG
975 TABLET ORAL ONCE
Refills: 0 | Status: COMPLETED | OUTPATIENT
Start: 2024-11-08 | End: 2024-11-08

## 2024-11-08 RX ORDER — ASPIRIN/MAG CARB/ALUMINUM AMIN 325 MG
81 TABLET ORAL DAILY
Refills: 0 | Status: DISCONTINUED | OUTPATIENT
Start: 2024-11-08 | End: 2024-11-09

## 2024-11-08 RX ORDER — AMLODIPINE BESYLATE 10 MG
1 TABLET ORAL
Refills: 0 | DISCHARGE

## 2024-11-08 RX ORDER — SODIUM CHLORIDE 9 MG/ML
1000 INJECTION, SOLUTION INTRAMUSCULAR; INTRAVENOUS; SUBCUTANEOUS
Refills: 0 | Status: DISCONTINUED | OUTPATIENT
Start: 2024-11-08 | End: 2024-11-08

## 2024-11-08 RX ORDER — SODIUM CHLORIDE 9 MG/ML
1000 INJECTION, SOLUTION INTRAMUSCULAR; INTRAVENOUS; SUBCUTANEOUS
Refills: 0 | Status: DISCONTINUED | OUTPATIENT
Start: 2024-11-08 | End: 2024-11-13

## 2024-11-08 RX ORDER — MEMANTINE HYDROCHLORIDE 21 MG/1
10 CAPSULE, EXTENDED RELEASE ORAL DAILY
Refills: 0 | Status: DISCONTINUED | OUTPATIENT
Start: 2024-11-08 | End: 2024-11-13

## 2024-11-08 RX ORDER — PANTOPRAZOLE SODIUM 40 MG/1
40 TABLET, DELAYED RELEASE ORAL
Refills: 0 | Status: DISCONTINUED | OUTPATIENT
Start: 2024-11-08 | End: 2024-11-13

## 2024-11-08 RX ORDER — CLOSTRIDIUM TETANI TOXOID ANTIGEN (FORMALDEHYDE INACTIVATED), CORYNEBACTERIUM DIPHTHERIAE TOXOID ANTIGEN (FORMALDEHYDE INACTIVATED), BORDETELLA PERTUSSIS TOXOID ANTIGEN (GLUTARALDEHYDE INACTIVATED), BORDETELLA PERTUSSIS FILAMENTOUS HEMAGGLUTININ ANTIGEN (FORMALDEHYDE INACTIVATED), BORDETELLA PERTUSSIS PERTACTIN ANTIGEN, AND BORDETELLA PERTUSSIS FIMBRIAE 2/3 ANTIGEN 5; 2; 2.5; 5; 3; 5 [LF]/.5ML; [LF]/.5ML; UG/.5ML; UG/.5ML; UG/.5ML; UG/.5ML
0.5 INJECTION, SUSPENSION INTRAMUSCULAR ONCE
Refills: 0 | Status: COMPLETED | OUTPATIENT
Start: 2024-11-08 | End: 2024-11-08

## 2024-11-08 RX ORDER — ACETAMINOPHEN 500 MG
650 TABLET ORAL EVERY 6 HOURS
Refills: 0 | Status: DISCONTINUED | OUTPATIENT
Start: 2024-11-08 | End: 2024-11-13

## 2024-11-08 RX ORDER — LEVOTHYROXINE SODIUM 88 MCG
25 TABLET ORAL DAILY
Refills: 0 | Status: DISCONTINUED | OUTPATIENT
Start: 2024-11-08 | End: 2024-11-13

## 2024-11-08 RX ORDER — HALOPERIDOL DECANOATE 50 MG/ML
0.5 INJECTION INTRAMUSCULAR ONCE
Refills: 0 | Status: COMPLETED | OUTPATIENT
Start: 2024-11-08 | End: 2024-11-08

## 2024-11-08 RX ORDER — ONDANSETRON HYDROCHLORIDE 2 MG/ML
4 INJECTION, SOLUTION INTRAMUSCULAR; INTRAVENOUS EVERY 8 HOURS
Refills: 0 | Status: DISCONTINUED | OUTPATIENT
Start: 2024-11-08 | End: 2024-11-11

## 2024-11-08 RX ORDER — MORPHINE SULFATE 30 MG/1
2 TABLET, EXTENDED RELEASE ORAL ONCE
Refills: 0 | Status: DISCONTINUED | OUTPATIENT
Start: 2024-11-08 | End: 2024-11-08

## 2024-11-08 RX ORDER — OXYBUTYNIN CHLORIDE 5 MG/1
5 TABLET ORAL
Refills: 0 | Status: DISCONTINUED | OUTPATIENT
Start: 2024-11-08 | End: 2024-11-13

## 2024-11-08 RX ORDER — MORPHINE SULFATE 30 MG/1
1 TABLET, EXTENDED RELEASE ORAL EVERY 4 HOURS
Refills: 0 | Status: DISCONTINUED | OUTPATIENT
Start: 2024-11-08 | End: 2024-11-11

## 2024-11-08 RX ORDER — AMLODIPINE BESYLATE 10 MG
5 TABLET ORAL DAILY
Refills: 0 | Status: DISCONTINUED | OUTPATIENT
Start: 2024-11-08 | End: 2024-11-13

## 2024-11-08 RX ORDER — MORPHINE SULFATE 30 MG/1
2 TABLET, EXTENDED RELEASE ORAL EVERY 4 HOURS
Refills: 0 | Status: DISCONTINUED | OUTPATIENT
Start: 2024-11-08 | End: 2024-11-11

## 2024-11-08 RX ORDER — DONEPEZIL HYDROCHLORIDE 23 MG/1
10 TABLET ORAL AT BEDTIME
Refills: 0 | Status: DISCONTINUED | OUTPATIENT
Start: 2024-11-08 | End: 2024-11-13

## 2024-11-08 RX ORDER — ASPIRIN/MAG CARB/ALUMINUM AMIN 325 MG
324 TABLET ORAL ONCE
Refills: 0 | Status: COMPLETED | OUTPATIENT
Start: 2024-11-08 | End: 2024-11-08

## 2024-11-08 RX ORDER — MAGNESIUM, ALUMINUM HYDROXIDE 200-200 MG
30 TABLET,CHEWABLE ORAL EVERY 4 HOURS
Refills: 0 | Status: DISCONTINUED | OUTPATIENT
Start: 2024-11-08 | End: 2024-11-13

## 2024-11-08 RX ADMIN — CLOSTRIDIUM TETANI TOXOID ANTIGEN (FORMALDEHYDE INACTIVATED), CORYNEBACTERIUM DIPHTHERIAE TOXOID ANTIGEN (FORMALDEHYDE INACTIVATED), BORDETELLA PERTUSSIS TOXOID ANTIGEN (GLUTARALDEHYDE INACTIVATED), BORDETELLA PERTUSSIS FILAMENTOUS HEMAGGLUTININ ANTIGEN (FORMALDEHYDE INACTIVATED), BORDETELLA PERTUSSIS PERTACTIN ANTIGEN, AND BORDETELLA PERTUSSIS FIMBRIAE 2/3 ANTIGEN 0.5 MILLILITER(S): 5; 2; 2.5; 5; 3; 5 INJECTION, SUSPENSION INTRAMUSCULAR at 10:57

## 2024-11-08 RX ADMIN — OXYBUTYNIN CHLORIDE 5 MILLIGRAM(S): 5 TABLET ORAL at 20:11

## 2024-11-08 RX ADMIN — Medication 40 MILLIGRAM(S): at 17:23

## 2024-11-08 RX ADMIN — Medication 324 MILLIGRAM(S): at 15:20

## 2024-11-08 RX ADMIN — Medication 975 MILLIGRAM(S): at 12:05

## 2024-11-08 RX ADMIN — MORPHINE SULFATE 2 MILLIGRAM(S): 30 TABLET, EXTENDED RELEASE ORAL at 17:45

## 2024-11-08 RX ADMIN — ONDANSETRON HYDROCHLORIDE 4 MILLIGRAM(S): 2 INJECTION, SOLUTION INTRAMUSCULAR; INTRAVENOUS at 17:22

## 2024-11-08 RX ADMIN — HALOPERIDOL DECANOATE 0.5 MILLIGRAM(S): 50 INJECTION INTRAMUSCULAR at 23:00

## 2024-11-08 RX ADMIN — MORPHINE SULFATE 2 MILLIGRAM(S): 30 TABLET, EXTENDED RELEASE ORAL at 23:00

## 2024-11-08 RX ADMIN — MORPHINE SULFATE 2 MILLIGRAM(S): 30 TABLET, EXTENDED RELEASE ORAL at 17:22

## 2024-11-08 RX ADMIN — QUETIAPINE FUMARATE 25 MILLIGRAM(S): 200 TABLET ORAL at 17:22

## 2024-11-08 NOTE — ED PROVIDER NOTE - RESPIRATORY, MLM
Called patient. Patient states she wants the information regarding Osteo specialist sent to her via [employer].  
I would recommend that she sees Dr Hamm for osteoporosis consult. Please help set up appt (virtual [place] if pt prefers).  
Reason for Call:  Other call back    Detailed comments:   Dr Cheryl Gutierrez - Osteoporosis at [facility] - this physician is retiring -  [facility] - Osteoporosis -- pt currently on this medication    Pt asking for a new recommendation for Osteoporsis    Please call patient for further details      Phone Number Patient can be reached at: Home number on file [phone] (home)    Best Time: anytime    Can we leave a detailed message on this number? NO    Call taken on 1/31/2022 at 4:16 PM by Piedad Wilkerson    
Breath sounds clear and equal bilaterally.

## 2024-11-08 NOTE — H&P ADULT - NSHPPHYSICALEXAM_GEN_ALL_CORE
T(C): 36.4 (11-08-24 @ 15:15), Max: 36.6 (11-08-24 @ 10:17)  HR: 85 (11-08-24 @ 15:15) (85 - 90)  BP: 153/92 (11-08-24 @ 15:15) (153/92 - 188/86)  RR: 18 (11-08-24 @ 15:15) (18 - 18)  SpO2: 100% (11-08-24 @ 15:15) (100% - 100%)    CONSTITUTIONAL: Well groomed, no apparent distress  EYES: PERRLA and symmetric, EOMI, No conjunctival or scleral injection, non-icteric  ENMT: Oral mucosa with moist membranes. no pharyngeal injection or exudates             NECK: Supple, symmetric and without tracheal deviation , bruising on forehead  RESP: No respiratory distress, no use of accessory muscles; CTA b/l, no WRR  CV: RRR, +S1S2, no MRG; no JVD; no peripheral edema  GI: Soft, NT, ND, no rebound, no guarding; no palpable masses; no hepatosplenomegaly; no hernia palpated  LYMPH: No cervical LAD or tenderness; no axillary LAD or tenderness; no inguinal LAD or tenderness  MSK: Right knee: +mild-moderate swelling over patella, superficial abrasion anterior patella, +TTP over patella with palpable defect.  SKIN: Bruise in forehead.   NEURO: unable to assess as patient has dementia   PSYCH: unable to assess as patient has dementia

## 2024-11-08 NOTE — H&P ADULT - ASSESSMENT
A/P:    1.  Right patellar fracture  -will need surgical intervention  -orthopedics service is following  -no prior history of cardiac disease  -Elevated troponin  -will need cardiac clearance before surgery  -if patient is cleared by the cardiology team then she will be medically optimized for the procedure.    2.  Elevated troponin  -no chest pain  -no acute EKG changes  -follow in telemetry   -follow repeat troponin  -started on asprin for now  -follow cardiology consult .    3.  Dementia  -on Seroquel Donepezil, memantine, lexapro    4.  HTN  -on amlodipine    5.  SCD for DVT ppx    6.  Code status  -DNR/DNI. MOLST is filled out

## 2024-11-08 NOTE — ED PROVIDER NOTE - ENMT, MLM
Airway patent, Nasal mucosa clear. Mouth with normal mucosa. Throat has no vesicles, no oropharyngeal exudates and uvula is midline. visible hematoma to mid forehead with overlying abrasions, abrasions also noted to nose and upper lip

## 2024-11-08 NOTE — PATIENT PROFILE ADULT - FALL HARM RISK - HARM RISK INTERVENTIONS
Assistance with ambulation/Assistance OOB with selected safe patient handling equipment/Communicate Risk of Fall with Harm to all staff/Discuss with provider need for PT consult/Monitor for mental status changes/Monitor gait and stability/Move patient closer to nurses' station/Provide patient with walking aids - walker, cane, crutches/Reinforce activity limits and safety measures with patient and family/Reorient to person, place and time as needed/Tailored Fall Risk Interventions/Toileting schedule using arm’s reach rule for commode and bathroom/Use of alarms - bed, chair and/or voice tab/Visual Cue: Yellow wristband and red socks/Bed in lowest position, wheels locked, appropriate side rails in place/Call bell, personal items and telephone in reach/Instruct patient to call for assistance before getting out of bed or chair/Non-slip footwear when patient is out of bed/[place] to call system/Physically safe environment - no spills, clutter or unnecessary equipment/Purposeful Proactive Rounding/Room/bathroom lighting operational, light cord in reach

## 2024-11-08 NOTE — ED PROVIDER NOTE - CLINICAL SUMMARY MEDICAL DECISION MAKING FREE TEXT BOX
Plan for patient who is a neuro alert status post mechanical fall, will CT, x-ray, pain meds, reevaluate. Plan for patient who is a neuro alert status post mechanical fall, will CT, x-ray, pain meds, reevaluate.    1140:   XR noted.   case d/w ortho and will evaluate pt Plan for patient who is a neuro alert status post mechanical fall, will CT, x-ray, pain meds, reevaluate.    1140:   XR noted.   case d/w ortho and will evaluate pt    1300:   pt evalauted by ortho and recommend admit to medicine and will take to OR tomorrow  1330:   case d/w Jomar and will admit Plan for patient who is a neuro alert status post mechanical fall, will CT, x-ray, pain meds, reevaluate.    1140:   XR noted.   case d/w ortho and will evaluate pt    1300:   pt evalauted by ortho and recommend admit to medicine and will take to OR tomorrow  1330:   case d/w Jomar and will admit    1400:   trop noted.   Jomar made aware and will up grade to tele and cards consult 88 y/o female presents status post fall where she hit R knee and face, from outpt day program, endorses headache and R knee pain. Denies LOC. Unknown last tetanus shot.   pt denies any neck pain, cp, sob, n/v/d/abd pain.   states increase knee pain with movement.  visible hematoma to forehead.   multiple abrasions to face and right knee.   mild ttp of right knee with no visible deformity,.  pt meet NA.  Plan for patient who is a neuro alert status post mechanical fall, will CT, x-ray, pain meds, reevaluate.    1140:   XR noted.   case d/w ortho and will evaluate pt    1300:   pt evalauted by ortho and recommend admit to medicine and will take to OR tomorrow  1330:   case d/w Jomar and will admit    1400:   trop noted.   Jomar made aware and will up grade to tele and cards consult

## 2024-11-08 NOTE — ED PROVIDER NOTE - OBJECTIVE STATEMENT
88 y/o female presents status post fall where she hit R knee and face, from outpt day program, endorses headache and R knee pain. Denies LOC. Unknown last tetanus shot. 88 y/o female presents status post fall where she hit R knee and face, from outpt day program, endorses headache and R knee pain. Denies LOC. Unknown last tetanus shot.   pt denies any neck pain, cp, sob, n/v/d/abd pain.   states increase knee pain with movement.

## 2024-11-08 NOTE — CONSULT NOTE ADULT - SUBJECTIVE AND OBJECTIVE BOX
Orthopedics Consult Note:    This is a 89y Female who presents to the ED today with c/o right  knee pain, swelling, LROM and difficulty ambulating s/p mechanical trip and fall today. Pt denies any other injuries. Pt denies head trauma or LOC. Pt denies any numbness, tingling or parethesias. Pt is a community ambulator without use of any assistive devices**//**with walker**//**cane at baseline.    Past Medical and Surgical History:  Patient unable to provide medical history (Father, Mother)    MEWS Score    Prior    Dementia    HLD (hyperlipidemia)    Pelvic fracture    Hypothyroidism    Anxiety    H/O constipation    H/O urinary incontinence    GERD (gastroesophageal reflux disease)    No significant past surgical history    fall    90+    SysAdmin_VisitLink        Allergies:  No Known Allergies      Vitals:  T(C): 36.6 (11-08-24 @ 10:17), Max: 36.6 (11-08-24 @ 10:17)  HR: 90 (11-08-24 @ 10:17) (90 - 90)  BP: 188/86 (11-08-24 @ 10:17) (188/86 - 188/86)  RR: 18 (11-08-24 @ 10:17) (18 - 18)  SpO2: 100% (11-08-24 @ 10:17) (100% - 100%)    Labs:                Imaging:  X-rays of the rightt knee demonstrate a displaced patella fracture.     Physical Exam:  PE right knee  +mild-moderate swelling over patella, no ecchymosis, no erythema, skin intact, normothermic. +TTP over patella. LROM 2' weakness and pain, unable to extend knee. Pt unable to SLR 2' pain and weakness. No pain with axial loading, no pain with log roll. Moving ankle and all toes, +EHL/FHL/TA/GS. SILT throughout. DP and PT pulses 2+.    Assessment:  89y Female with a right patella fracture    **Send out**  Plan:  WBAT RLE**//**LLE with Bulky Sears Knee Immobilizer.  Pain control.  Ice application.  RLE**//**LLE elevation.  Pt advised surgical fixation of right**//**left patella fracture may be necessary.  Follow-up with **Surgeon** in the office within 3-5 days; call office for appointment.  Return to ED immediately if severe pain, severe swelling, numbness/tingling, unable to move toes or toes changing color.     Case discussed with . **Surgeon** who agrees with plan.    **Admission**  Plan:  Patient advised that surgical fixation of right**//**left patellla fracture with ORIF will be necessary.  Surgical procedure was discussed in detail with patient including all R/B/As. Pt expressed understanding and consent for surgery was obtained.  Admit to Medicine for medical optimization and clearance**//** Ortho.  f/u Medical clearance.  f/u labs/imaging.  WBAT RLE**//**LLE with Bulky Sears Knee Immobilizer.  Pain control.  Ice application.  RLE**//**LLE elevation.  NPO and hold chemical anticoagulation for now**//**after midnight with plan for possible OR today**//** tomorrow pending medical optimization and clearance.  IVF while NPO.    Case discussed with . **Surgeon** who agrees with plan.     Orthopedics Consult Note:    This is a 89y Female, daughter present at bedside, who presents to the ED with c/o right knee pain, swelling, LROM and difficulty ambulating s/p mechanical trip and fall today while [facility]. Pt with history of dementia, history given by daughter. ambulates with cane at baseline. bruising noted to forehead.  Pt denies any numbness, tingling or parethesias.     Past Medical and Surgical History:  Patient unable to provide medical history (Father, Mother)    MEWS Score    Prior    Dementia    HLD (hyperlipidemia)    Pelvic fracture    Hypothyroidism    Anxiety    H/O constipation    H/O urinary incontinence    GERD (gastroesophageal reflux disease)    No significant past surgical history    fall    90+    SysAdmin_VisitLink        Allergies:  No Known Allergies      Vitals:  T(C): 36.6 (11-08-24 @ 10:17), Max: 36.6 (11-08-24 @ 10:17)  HR: 90 (11-08-24 @ 10:17) (90 - 90)  BP: 188/86 (11-08-24 @ 10:17) (188/86 - 188/86)  RR: 18 (11-08-24 @ 10:17) (18 - 18)  SpO2: 100% (11-08-24 @ 10:17) (100% - 100%)    Labs:                Imaging:  X-rays of the right knee demonstrate a displaced patella fracture.     Physical Exam:  PE right knee  +mild-moderate swelling over patella, superficial abrasion anterior patella, +TTP over patella with palpable defect.  Pt unable to SLR 2' pain and weakness. No pain with axial loading, no pain with log roll. Moving ankle and all toes, +EHL/FHL/TA/GS. SILT throughout. DP and PT pulses 2+.    Assessment:  89y Female with a right patella fracture    Plan:  Patient advised that surgical fixation of right patella fracture with ORIF will be necessary.  Surgical procedure was discussed in detail with patient/patients daughter including all R/B/As. pt scarleter expressed understanding and consent for surgery was obtained.  Admit to Medicine for medical optimization and clearance  f/u Medical clearance.  f/u labs/imaging.  WBAT RLE with Bulky Sears Knee Immobilizer.  Pain control.  Ice application.  RLE elevation.  NPO at midnight   hold chemical anticoagulation after midnight  plan for OR tomorrow pending medical optimization and clearance.  IVF while NPO.    Case discussed with  who agrees with plan.     Orthopedics Consult Note:    This is a 89y Female, daughter present at bedside, who presents to the ED with c/o right knee pain, swelling, LROM and difficulty ambulating s/p mechanical trip and fall today while walking into her community center. Pt with history of dementia, history given by daughter. ambulates with cane at baseline. + head strike no LOC, bruising noted to anterior forehead.  Pt denies any numbness, tingling or parethesias.     Past Medical and Surgical History:  Patient unable to provide medical history (Father, Mother)    MEWS Score    Prior    Dementia    HLD (hyperlipidemia)    Pelvic fracture    Hypothyroidism    Anxiety    H/O constipation    H/O urinary incontinence    GERD (gastroesophageal reflux disease)    No significant past surgical history    fall    90+    SysAdmin_VisitLink        Allergies:  No Known Allergies      Vitals:  T(C): 36.6 (11-08-24 @ 10:17), Max: 36.6 (11-08-24 @ 10:17)  HR: 90 (11-08-24 @ 10:17) (90 - 90)  BP: 188/86 (11-08-24 @ 10:17) (188/86 - 188/86)  RR: 18 (11-08-24 @ 10:17) (18 - 18)  SpO2: 100% (11-08-24 @ 10:17) (100% - 100%)    Labs:                Imaging:  X-rays of the right knee demonstrate a displaced patella fracture.     Physical Exam:  PE right knee  +mild-moderate swelling over patella, superficial abrasion anterior patella, +TTP over patella with palpable defect.  Pt unable to SLR 2' pain and weakness. No pain with axial loading, no pain with log roll. Moving ankle and all toes, +EHL/FHL/TA/GS. SILT throughout. DP and PT pulses 2+.    Assessment:  89y Female with a right patella fracture    Plan:  Patient advised that surgical fixation of right patella fracture with ORIF will be necessary.  Surgical procedure was discussed in detail with patient/patients daughter including all R/B/As. pt daughter expressed understanding and consent for surgery was obtained via daughter the HCP; Elena [phone]  Admit to Medicine for medical optimization and clearance  f/u Medical clearance.  f/u labs/imaging.  WBAT RLE with Bulky Sears Knee Immobilizer.  Pain control.  Ice application.  RLE elevation.  NPO at midnight   hold chemical anticoagulation after midnight  plan for OR tomorrow 0800 pending medical optimization and clearance.  IVF while NPO.    Case discussed with  who agrees with plan.

## 2024-11-08 NOTE — ED PROVIDER NOTE - SKIN, MLM
Skin normal color for race, warm, dry and intact. No evidence of rash. visible abrasion to anterior knee

## 2024-11-08 NOTE — ED PROVIDER NOTE - MUSCULOSKELETAL, MLM
Spine appears normal, range of motion is not limited, no muscle or joint tenderness, moving all extremities, neurovascularly intact, mild tenderness to palpation to R knee with no palpable deformity

## 2024-11-08 NOTE — ED ADULT NURSE NOTE - CHIEF COMPLAINT QUOTE
pt presents to Ed with complaints of mechanical trip and fall at [facility] this am. pt had head strike and right knee abrasion and pain. hx of dementia. does not remember fall. neuro alert initiated in triage by Md Land.

## 2024-11-08 NOTE — ED ADULT NURSE NOTE - NSFALLRISKINTERV_ED_ALL_ED
Assistance OOB with selected safe patient handling equipment if applicable/Assistance with ambulation/Communicate fall risk and risk factors to all staff, patient, and family/Monitor gait and stability/Monitor for mental status changes and reorient to person, place, and time, as needed/Provide visual cue: yellow wristband, yellow gown, etc/Reinforce activity limits and safety measures with patient and family/Toileting schedule using arm’s reach rule for commode and bathroom/Use of alarms - bed, stretcher, chair and/or video monitoring/Call bell, personal items and telephone in reach/Instruct patient to call for assistance before getting out of bed/chair/stretcher/Non-slip footwear applied when patient is off stretcher/[place] to call system/Physically safe environment - no spills, clutter or unnecessary equipment/Purposeful Proactive Rounding/Room/bathroom lighting operational, light cord in reach

## 2024-11-08 NOTE — ED ADULT NURSE REASSESSMENT NOTE - NS ED NURSE REASSESS COMMENT FT1
Purposeful proactive rounding completed at this time, all needs addressed, no change from previous assessment. Dinner provided, daughter at bedside. Denies pain at present, patient updated on admission status and plan of care. All questions addressed and answered. Comfort and safety measures maintained. Will continue to monitor.

## 2024-11-08 NOTE — ED PROVIDER NOTE - CARE PLAN
1 Principal Discharge DX:	Patellar fracture   Principal Discharge DX:	Patellar fracture  Secondary Diagnosis:	Elevated troponin

## 2024-11-08 NOTE — ED ADULT NURSE REASSESSMENT NOTE - NS ED NURSE REASSESS COMMENT FT1
Received patient AxOx1, hx of dementia. Daughter at bedside and states her mom is at her baseline mental status. VSS, EKG done, MD Hadley at bedside. Patient with c/o pain to right knee, 7/10, MD hadley aware and new orders received. Comfort and safety measures maintained, meal order placed, daughter and patient updated on plan of care. Will continue to monitor.

## 2024-11-08 NOTE — ED ADULT TRIAGE NOTE - CHIEF COMPLAINT QUOTE
pt presents to Ed with complaints of mechanical trip and fall at [facility] this am. pt had head strike and right knee abrasion and pain. hx of dementia. does not remember fall. neuro alert initiated in triage by Md Land.

## 2024-11-08 NOTE — H&P ADULT - HISTORY OF PRESENT ILLNESS
89y Female, daughter present at bedside, who presents to the ED with complain of right knee pain, swelling, LROM and difficulty ambulating s/p mechanical trip and fall today while walking into her community center. Patient with history of dementia, history given by daughter at the bedside. patient ambulates with cane at baseline. She had + head strike but no LOC, bruising noted to anterior forehead.  Patient denies any numbness, tingling or paresthesia. No chest pain. No Sob. As per daughter patient has no history of heart disease.

## 2024-11-08 NOTE — ED ADULT NURSE NOTE - OBJECTIVE STATEMENT
PT presents to ED c/o trip and fall. +head strike. Denies LOC. pt c/o pain to right knee. abrasion noted to lip and right knee

## 2024-11-09 ENCOUNTER — TRANSCRIPTION ENCOUNTER (OUTPATIENT)
Age: 89
End: 2024-11-09

## 2024-11-09 DIAGNOSIS — Z01.810 ENCOUNTER FOR PREPROCEDURAL CARDIOVASCULAR EXAMINATION: ICD-10-CM

## 2024-11-09 DIAGNOSIS — R79.89 OTHER SPECIFIED ABNORMAL FINDINGS OF BLOOD CHEMISTRY: ICD-10-CM

## 2024-11-09 DIAGNOSIS — I10 ESSENTIAL (PRIMARY) HYPERTENSION: ICD-10-CM

## 2024-11-09 LAB
ANION GAP SERPL CALC-SCNC: 3 MMOL/L — LOW (ref 5–17)
ANION GAP SERPL CALC-SCNC: 6 MMOL/L — SIGNIFICANT CHANGE UP (ref 5–17)
APTT BLD: 33.5 SEC — SIGNIFICANT CHANGE UP (ref 24.5–35.6)
BLD GP AB SCN SERPL QL: SIGNIFICANT CHANGE UP
BUN SERPL-MCNC: 12 MG/DL — SIGNIFICANT CHANGE UP (ref 7–23)
BUN SERPL-MCNC: 14 MG/DL — SIGNIFICANT CHANGE UP (ref 7–23)
CALCIUM SERPL-MCNC: 7.8 MG/DL — LOW (ref 8.5–10.1)
CALCIUM SERPL-MCNC: 8.7 MG/DL — SIGNIFICANT CHANGE UP (ref 8.5–10.1)
CHLORIDE SERPL-SCNC: 106 MMOL/L — SIGNIFICANT CHANGE UP (ref 96–108)
CHLORIDE SERPL-SCNC: 107 MMOL/L — SIGNIFICANT CHANGE UP (ref 96–108)
CO2 SERPL-SCNC: 25 MMOL/L — SIGNIFICANT CHANGE UP (ref 22–31)
CO2 SERPL-SCNC: 30 MMOL/L — SIGNIFICANT CHANGE UP (ref 22–31)
CREAT SERPL-MCNC: 0.91 MG/DL — SIGNIFICANT CHANGE UP (ref 0.5–1.3)
CREAT SERPL-MCNC: 0.92 MG/DL — SIGNIFICANT CHANGE UP (ref 0.5–1.3)
EGFR: 60 ML/MIN/1.73M2 — SIGNIFICANT CHANGE UP
EGFR: 61 ML/MIN/1.73M2 — SIGNIFICANT CHANGE UP
GLUCOSE SERPL-MCNC: 114 MG/DL — HIGH (ref 70–99)
GLUCOSE SERPL-MCNC: 145 MG/DL — HIGH (ref 70–99)
HCT VFR BLD CALC: 30.5 % — LOW (ref 34.5–45)
HCT VFR BLD CALC: 34.6 % — SIGNIFICANT CHANGE UP (ref 34.5–45)
HGB BLD-MCNC: 11.3 G/DL — LOW (ref 11.5–15.5)
HGB BLD-MCNC: 9.9 G/DL — LOW (ref 11.5–15.5)
INR BLD: 1.06 RATIO — SIGNIFICANT CHANGE UP (ref 0.85–1.16)
MCHC RBC-ENTMCNC: 29.5 PG — SIGNIFICANT CHANGE UP (ref 27–34)
MCHC RBC-ENTMCNC: 29.6 PG — SIGNIFICANT CHANGE UP (ref 27–34)
MCHC RBC-ENTMCNC: 32.5 G/DL — SIGNIFICANT CHANGE UP (ref 32–36)
MCHC RBC-ENTMCNC: 32.7 G/DL — SIGNIFICANT CHANGE UP (ref 32–36)
MCV RBC AUTO: 90.3 FL — SIGNIFICANT CHANGE UP (ref 80–100)
MCV RBC AUTO: 91 FL — SIGNIFICANT CHANGE UP (ref 80–100)
PLATELET # BLD AUTO: 234 K/UL — SIGNIFICANT CHANGE UP (ref 150–400)
PLATELET # BLD AUTO: 269 K/UL — SIGNIFICANT CHANGE UP (ref 150–400)
POTASSIUM SERPL-MCNC: 3.4 MMOL/L — LOW (ref 3.5–5.3)
POTASSIUM SERPL-MCNC: 3.8 MMOL/L — SIGNIFICANT CHANGE UP (ref 3.5–5.3)
POTASSIUM SERPL-SCNC: 3.4 MMOL/L — LOW (ref 3.5–5.3)
POTASSIUM SERPL-SCNC: 3.8 MMOL/L — SIGNIFICANT CHANGE UP (ref 3.5–5.3)
PROTHROM AB SERPL-ACNC: 12.2 SEC — SIGNIFICANT CHANGE UP (ref 9.9–13.4)
RBC # BLD: 3.35 M/UL — LOW (ref 3.8–5.2)
RBC # BLD: 3.83 M/UL — SIGNIFICANT CHANGE UP (ref 3.8–5.2)
RBC # FLD: 13.7 % — SIGNIFICANT CHANGE UP (ref 10.3–14.5)
RBC # FLD: 13.8 % — SIGNIFICANT CHANGE UP (ref 10.3–14.5)
SODIUM SERPL-SCNC: 138 MMOL/L — SIGNIFICANT CHANGE UP (ref 135–145)
SODIUM SERPL-SCNC: 139 MMOL/L — SIGNIFICANT CHANGE UP (ref 135–145)
TROPONIN I, HIGH SENSITIVITY RESULT: 107.71 NG/L — HIGH
WBC # BLD: 6.37 K/UL — SIGNIFICANT CHANGE UP (ref 3.8–10.5)
WBC # BLD: 6.61 K/UL — SIGNIFICANT CHANGE UP (ref 3.8–10.5)
WBC # FLD AUTO: 6.37 K/UL — SIGNIFICANT CHANGE UP (ref 3.8–10.5)
WBC # FLD AUTO: 6.61 K/UL — SIGNIFICANT CHANGE UP (ref 3.8–10.5)

## 2024-11-09 PROCEDURE — 99222 1ST HOSP IP/OBS MODERATE 55: CPT

## 2024-11-09 PROCEDURE — 93010 ELECTROCARDIOGRAM REPORT: CPT

## 2024-11-09 PROCEDURE — 99233 SBSQ HOSP IP/OBS HIGH 50: CPT

## 2024-11-09 RX ORDER — HYDROMORPHONE HCL/0.9% NACL/PF 6 MG/30 ML
0.5 PATIENT CONTROLLED ANALGESIA SYRINGE INTRAVENOUS
Refills: 0 | Status: DISCONTINUED | OUTPATIENT
Start: 2024-11-09 | End: 2024-11-09

## 2024-11-09 RX ORDER — TRANEXAMIC ACID 650 MG/1
1000 TABLET ORAL ONCE
Refills: 0 | Status: DISCONTINUED | OUTPATIENT
Start: 2024-11-09 | End: 2024-11-13

## 2024-11-09 RX ORDER — ACETAMINOPHEN 500 MG
1000 TABLET ORAL EVERY 8 HOURS
Refills: 0 | Status: DISCONTINUED | OUTPATIENT
Start: 2024-11-11 | End: 2024-11-13

## 2024-11-09 RX ORDER — POLYETHYLENE GLYCOL 3350 17 G/17G
17 POWDER, FOR SOLUTION ORAL AT BEDTIME
Refills: 0 | Status: DISCONTINUED | OUTPATIENT
Start: 2024-11-10 | End: 2024-11-13

## 2024-11-09 RX ORDER — POTASSIUM CHLORIDE 10 MEQ
40 TABLET, EXTENDED RELEASE ORAL ONCE
Refills: 0 | Status: COMPLETED | OUTPATIENT
Start: 2024-11-09 | End: 2024-11-09

## 2024-11-09 RX ORDER — OXYCODONE HYDROCHLORIDE 30 MG/1
5 TABLET ORAL EVERY 4 HOURS
Refills: 0 | Status: DISCONTINUED | OUTPATIENT
Start: 2024-11-10 | End: 2024-11-13

## 2024-11-09 RX ORDER — MAGNESIUM, ALUMINUM HYDROXIDE 200-200 MG
30 TABLET,CHEWABLE ORAL
Refills: 0 | Status: DISCONTINUED | OUTPATIENT
Start: 2024-11-10 | End: 2024-11-13

## 2024-11-09 RX ORDER — ONDANSETRON HYDROCHLORIDE 2 MG/ML
4 INJECTION, SOLUTION INTRAMUSCULAR; INTRAVENOUS ONCE
Refills: 0 | Status: DISCONTINUED | OUTPATIENT
Start: 2024-11-09 | End: 2024-11-09

## 2024-11-09 RX ORDER — ASPIRIN/MAG CARB/ALUMINUM AMIN 325 MG
81 TABLET ORAL
Refills: 0 | Status: DISCONTINUED | OUTPATIENT
Start: 2024-11-10 | End: 2024-11-13

## 2024-11-09 RX ORDER — OXYCODONE HYDROCHLORIDE 30 MG/1
2.5 TABLET ORAL EVERY 4 HOURS
Refills: 0 | Status: DISCONTINUED | OUTPATIENT
Start: 2024-11-10 | End: 2024-11-13

## 2024-11-09 RX ORDER — TRAMADOL HYDROCHLORIDE 50 MG/1
25 TABLET, COATED ORAL EVERY 6 HOURS
Refills: 0 | Status: DISCONTINUED | OUTPATIENT
Start: 2024-11-10 | End: 2024-11-11

## 2024-11-09 RX ORDER — FENTANYL CITRAT/DEXTROSE 5%/PF 1250MCG/50
25 PATIENT CONTROLLED ANALGESIA SYRINGE INTRAVENOUS
Refills: 0 | Status: DISCONTINUED | OUTPATIENT
Start: 2024-11-09 | End: 2024-11-09

## 2024-11-09 RX ORDER — SENNA 187 MG
2 TABLET ORAL AT BEDTIME
Refills: 0 | Status: DISCONTINUED | OUTPATIENT
Start: 2024-11-10 | End: 2024-11-13

## 2024-11-09 RX ORDER — MAGNESIUM HYDROXIDE 1200 MG/15ML
30 SUSPENSION ORAL DAILY
Refills: 0 | Status: DISCONTINUED | OUTPATIENT
Start: 2024-11-10 | End: 2024-11-13

## 2024-11-09 RX ORDER — ACETAMINOPHEN 500 MG
1000 TABLET ORAL ONCE
Refills: 0 | Status: COMPLETED | OUTPATIENT
Start: 2024-11-10 | End: 2024-11-10

## 2024-11-09 RX ORDER — ONDANSETRON HYDROCHLORIDE 2 MG/ML
4 INJECTION, SOLUTION INTRAMUSCULAR; INTRAVENOUS EVERY 6 HOURS
Refills: 0 | Status: DISCONTINUED | OUTPATIENT
Start: 2024-11-10 | End: 2024-11-13

## 2024-11-09 RX ADMIN — Medication 25 MICROGRAM(S): at 05:39

## 2024-11-09 RX ADMIN — Medication 40 MILLIEQUIVALENT(S): at 09:18

## 2024-11-09 RX ADMIN — Medication 650 MILLIGRAM(S): at 17:45

## 2024-11-09 RX ADMIN — MORPHINE SULFATE 2 MILLIGRAM(S): 30 TABLET, EXTENDED RELEASE ORAL at 18:30

## 2024-11-09 RX ADMIN — DONEPEZIL HYDROCHLORIDE 10 MILLIGRAM(S): 23 TABLET ORAL at 21:15

## 2024-11-09 RX ADMIN — Medication 5 MILLIGRAM(S): at 09:19

## 2024-11-09 RX ADMIN — MORPHINE SULFATE 2 MILLIGRAM(S): 30 TABLET, EXTENDED RELEASE ORAL at 22:14

## 2024-11-09 RX ADMIN — SODIUM CHLORIDE 100 MILLILITER(S): 9 INJECTION, SOLUTION INTRAMUSCULAR; INTRAVENOUS; SUBCUTANEOUS at 23:59

## 2024-11-09 RX ADMIN — MORPHINE SULFATE 2 MILLIGRAM(S): 30 TABLET, EXTENDED RELEASE ORAL at 17:58

## 2024-11-09 RX ADMIN — OXYBUTYNIN CHLORIDE 5 MILLIGRAM(S): 5 TABLET ORAL at 05:39

## 2024-11-09 RX ADMIN — OXYBUTYNIN CHLORIDE 5 MILLIGRAM(S): 5 TABLET ORAL at 17:43

## 2024-11-09 RX ADMIN — MEMANTINE HYDROCHLORIDE 10 MILLIGRAM(S): 21 CAPSULE, EXTENDED RELEASE ORAL at 09:19

## 2024-11-09 RX ADMIN — ESCITALOPRAM 15 MILLIGRAM(S): 10 TABLET, FILM COATED ORAL at 09:19

## 2024-11-09 RX ADMIN — QUETIAPINE FUMARATE 25 MILLIGRAM(S): 200 TABLET ORAL at 09:19

## 2024-11-09 RX ADMIN — MORPHINE SULFATE 2 MILLIGRAM(S): 30 TABLET, EXTENDED RELEASE ORAL at 00:40

## 2024-11-09 RX ADMIN — Medication 650 MILLIGRAM(S): at 18:18

## 2024-11-09 RX ADMIN — PANTOPRAZOLE SODIUM 40 MILLIGRAM(S): 40 TABLET, DELAYED RELEASE ORAL at 05:40

## 2024-11-09 NOTE — DISCHARGE NOTE PROVIDER - NSDCCPCAREPLAN_GEN_ALL_CORE_FT
PRINCIPAL DISCHARGE DIAGNOSIS  Diagnosis: Patellar fracture  Assessment and Plan of Treatment:       SECONDARY DISCHARGE DIAGNOSES  Diagnosis: Elevated troponin  Assessment and Plan of Treatment:      PRINCIPAL DISCHARGE DIAGNOSIS  Diagnosis: Patellar fracture  Assessment and Plan of Treatment: Fall, traumatic R patellar fracture. You received surgery with open reduction internal fixation performed 11/9. Tolerated procedure well. Orthopedic Surgery input appreciated. Continue weight bearing as tolerated in cylinder cast. Pain control as needed. DVT prophylaxis with aspirin 81mg twice a day. PT/OT recommending LEOPOLDO. Continue restorative therapy sessions. Outpatient follow-up with Dr Guillory.      SECONDARY DISCHARGE DIAGNOSES  Diagnosis: Elevated troponin  Assessment and Plan of Treatment: Likely myocardial demand ischemia without infarction. Mild troponin elevation in the absence of acute coronary syndrome and with no significant rise/fall on serial assays; similar elevation during 1/2023 hospitalization; ECGs without dynamic change. Per patient's, no recent chest pain and there is no history of heart disease.  No further work-up planned at this time.    Diagnosis: HTN (hypertension)  Assessment and Plan of Treatment: BP controlled. Continue amlodipine and metoprolol    Diagnosis: Hypothyroidism  Assessment and Plan of Treatment: Stable. Continue levothyroxine    Diagnosis: GERD (gastroesophageal reflux disease)  Assessment and Plan of Treatment: Stable. Continue pantoprazole    Diagnosis: Dementia, senile with depression  Assessment and Plan of Treatment: Stable. Continue to reorient frequently. Continue donepezil, memantine and escitalopram.

## 2024-11-09 NOTE — PROGRESS NOTE ADULT - ASSESSMENT
1.  Right patellar fracture  -OR today   -orthopedics service is following  -no prior history of cardiac disease  -Elevated troponin  -seen and cleared by Cardiology   -medically optimized for the procedure   -if patient is cleared by the cardiology team then she will be medically optimized for the procedure.    2.  Elevated troponin  -no chest pain  -no acute EKG changes  -follow in telemetry   -follow repeat troponin  -started on asprin for now  -cardiology following     3.  Dementia  -on Seroquel Donepezil, memantine, lexapro    4.  HTN  -on amlodipine    5.  SCD for DVT ppx    6.  Code status  -DNR/DNI. MOLST in the chart

## 2024-11-09 NOTE — DISCHARGE NOTE PROVIDER - NSDCFUADDINST_GEN_ALL_CORE_FT
Orthopedics Patella Fracture Instructions  1. Keep Kai brace on at all times except for sponge bath and to check skin daily. Must keep leg STRIAGHT at ALL times. Can cut off ACE wrap to wash skin and leave it off.  2. Wiggle toes and ankle often  3. Elevate ankle on pillow  4. ICE packs over knee, no heat packs  5. DVT PE ppx Lovenox or other per AC Team or Medical team  6. OK to weight bear as tolerated WBAT with rolling walker and assistance  7. Must get out of bed daily  8. Follow up with orthopedist listed below in 1-2 weeks.

## 2024-11-09 NOTE — BRIEF OPERATIVE NOTE - ANTIBIOTIC PROTOCOL
No new care gaps identified.  [facility] Embedded Care Gaps. Reference number: [id number]. 9/15/2022   12:18:18 AM CDT  
Refill Decision Note   Aretha Nguyen  is requesting a refill authorization.  Brief Assessment and Rationale for Refill:  Approve     Medication Therapy Plan:       Medication Reconciliation Completed: No   Comments:     No Care Gaps recommended.     Note composed:3:54 AM 09/15/2022          
Followed protocol

## 2024-11-09 NOTE — PROGRESS NOTE ADULT - SUBJECTIVE AND OBJECTIVE BOX
HOSPITALIST ATTENDING PROGRESS NOTE     Chart and meds reviewed. Patient seen and examined       Interval Hx/Events: Pt seen and evaluated. Plan for OR today       All other systems and founds to be negative with exception of what has been described above.         PHYSICAL EXAM:  Vital Signs Last 24 Hrs  T(C): 37.1 (2024 17:26), Max: 37.1 (2024 17:)  T(F): 98.8 (:), Max: 98.8 (:)  HR: 79 (:) (71 - 90)  BP: 155/88 (:) (125/72 - 162/88)  RR: 18 (:) (12 - 18)  SpO2: 100% (:) (94% - 100%)    Parameters below as of :26  Patient On (Oxygen Delivery Method): room air      Daily     Daily Weight in k.9 (2024 21:45)    GEN: NAD   HEENT: EOMI,  moist mucous membranes  NECK : Soft and supple, no JVD  LUNG: CTABL, No wheezing, rales or rhonchi  CVS: S1S2+, RRR, no M/G/R  GI: BS+, soft, NT/ND, no guarding, no rebound  EXTREMITIES: No peripheral edema  VASCULAR: 2+ peripheral pulses  NEURO: AAOx1-2, grossly non-focal   SKIN: No rashes    HOME MEDICATIONS:  Home Medications:  amLODIPine 5 mg oral tablet: 1 tab(s) orally once a day (2024 15:02)  donepezil 10 mg oral tablet: 1 tab(s) orally once a day (at bedtime) (2024 15:01)  escitalopram 10 mg oral tablet: 1.5 tab(s) orally once a day (2024 14:57)  levothyroxine 25 mcg (0.025 mg) oral tablet: 1 tab(s) orally once a day (at bedtime) (2024 15:01)  memantine 10 mg oral tablet: 1 tab(s) orally once a day (2024 15:01)  omeprazole 20 mg oral delayed release capsule: 1 cap(s) orally once a day (2024 15:01)  QUEtiapine 25 mg oral tablet: 1 tab(s) orally (2024 15:03)  trospium 20 mg oral tablet: 1 tab(s) orally once a day (2024 15:01)      MEDICATIONS  MEDICATIONS  (STANDING):  amLODIPine   Tablet 5 milliGRAM(s) Oral daily  donepezil 10 milliGRAM(s) Oral at bedtime  escitalopram 15 milliGRAM(s) Oral daily  influenza  Vaccine (HIGH DOSE) 0.5 milliLiter(s) IntraMuscular once  levothyroxine 25 MICROGram(s) Oral daily  memantine 10 milliGRAM(s) Oral daily  oxybutynin 5 milliGRAM(s) Oral two times a day  pantoprazole    Tablet 40 milliGRAM(s) Oral before breakfast  QUEtiapine 25 milliGRAM(s) Oral daily  sodium chloride 0.9%. 1000 milliLiter(s) (100 mL/Hr) IV Continuous <Continuous>  tranexamic acid IVPB 1000 milliGRAM(s) IV Intermittent once  tranexamic acid IVPB 1000 milliGRAM(s) IV Intermittent once      LABS: All Labs Reviewed:                        9.9    6.37  )-----------( 234      ( 2024 13:54 )             30.5     11-    138  |  107  |  12  ----------------------------<  145[H]  3.8   |  25  |  0.92    Ca    7.8[L]      2024 13:54    TPro  7.8  /  Alb  3.6  /  TBili  0.3  /  DBili  x   /  AST  20  /  ALT  14  /  AlkPhos  61  11-08    PT/INR - ( 2024 06:18 )   PT: 12.2 sec;   INR: 1.06 ratio         PTT - ( 2024 06:18 )  PTT:33.5 sec    Urinalysis Basic - ( 2024 13:54 )    Color: x / Appearance: x / SG: x / pH: x  Gluc: 145 mg/dL / Ketone: x  / Bili: x / Urobili: x   Blood: x / Protein: x / Nitrite: x   Leuk Esterase: x / RBC: x / WBC x   Sq Epi: x / Non Sq Epi: x / Bacteria: x        Blood Culture:   I&O's Detail    2024 07:01  -  2024 18:33  --------------------------------------------------------  IN:    Lactated Ringers: 150 mL    Other (mL): 400 mL  Total IN: 550 mL    OUT:    Oral Fluid: 0 mL    Voided (mL): 0 mL  Total OUT: 0 mL    Total NET: 550 mL        CAPILLARY BLOOD GLUCOSE            CARDIOLOGY TESTING         EKG     ECHO       RADIOLOGY

## 2024-11-09 NOTE — DISCHARGE NOTE PROVIDER - NSDCPNSUBOBJ_GEN_ALL_CORE
Chief Complaint: Trip and fall, R knee pain and swelling, difficulty with ambulating    Interval Hx: Patient seen and examined. History limited due to patient's dementia. No acute pain complaints. Resting comfortably. Still with some difficulty with ambulation. Seen by PT. Recommended LEOPOLDO. Orthopedic Surgery advises WBAT to RLE in cylinder cast. Pain control as needed. DVT px with ASA 81mg BID. Stable for discharge. Auth received.    ROS: Multi system review is comprehensively negative x 10 systems except as above    Vitals:  T(F): 98.7 (12 Nov 2024 16:05), Max: 98.7 (12 Nov 2024 16:05)  HR: 75 (13 Nov 2024 07:26) (75 - 79)  BP: 130/77 (13 Nov 2024 07:26) (120/64 - 130/77)  RR: 18 (13 Nov 2024 07:26) (18 - 18)  SpO2: 100% (13 Nov 2024 07:26) (99% - 100%)    Exam:  Gen: Comfortable  HEENT: NCAT PERRL EOMI MMM  Neck: Supple, No JVD, No LAD  CVS: s1 s2 normal, rate ~80  Chest: Normal resp effort, lungs CTA B/L  Abd: Non distended, +BS, soft, non tender  Ext: RLE in cylinder cast  Skin: Warm, dry  Mood: Calm, pleasant  Neuro: Awake to voice, follows simple commands, minimally verbal    Labs:                      9.4    6.08  )--------( 260                  28.9       142  |  109  |  10  -----------------------<  98  3.8   |   31   |  0.70    Ca  7.9    Phos  2.4    Mg   2.1        Troponin 109 --> 116 --> 108    Imaging:  XR R knee 3 views 11/8: Comminuted transverse fracture through the midportion ofthe patella. Bony fragments are  by up to 2 cm. There is a small joint effusion.    XR R Tibia and fibula 2 views 11/8: The leg is in a splint partially obscures the adjacent bony structures. No evidence of fracture in the tibia or fibula.    XR R femur 2 views 11/8: The femur appears intact. No evidence of focal bony destruction or periosteal reaction.    CT R knee WO 11/8: Acute comminuted displaced right patellar fracture including dominant oblique transverse component through the midpole with small more significantly displaced osteochondral fragment along its deep central margin.    CT C spine WO 11/8: Vertebral body heights are intact. Grade 1 anterolisthesis C4 on C5 unchanged. Multilevel degenerative changes noted resulting in multilevel spinal canal stenosis and neural foraminal narrowing. No acute fracture cervical spine    CT head WO 11/8: Moderate diffuse parenchymal volume loss. There are areas of low attenuation in the periventricular white matter likely related to mild chronic microvascular ischemic changes. There is no acute intracranial hemorrhage, parenchymal mass, mass effect or midline shift. There is no acute territorial infarct. There is no hydrocephalus. The cranium is intact. Partially visualized mildly displaced nasal bone fracture noted. Small osteoma ethmoid sinus.    Cardiac Testing:  EKG 11/9: Rate 86.  Sinus rhythm with 1st degree A-V block. Nonspecific T wave abnormality.    EKG 11/8: Rate 83. Normal sinus rhythm. Nonspecific ST and T wave abnormality

## 2024-11-09 NOTE — DISCHARGE NOTE PROVIDER - NSDCMRMEDTOKEN_GEN_ALL_CORE_FT
amLODIPine 5 mg oral tablet: 1 tab(s) orally once a day  donepezil 10 mg oral tablet: 1 tab(s) orally once a day (at bedtime)  escitalopram 10 mg oral tablet: 1.5 tab(s) orally once a day  levothyroxine 25 mcg (0.025 mg) oral tablet: 1 tab(s) orally once a day (at bedtime)  memantine 10 mg oral tablet: 1 tab(s) orally once a day  omeprazole 20 mg oral delayed release capsule: 1 cap(s) orally once a day  QUEtiapine 25 mg oral tablet: 1 tab(s) orally  trospium 20 mg oral tablet: 1 tab(s) orally once a day   acetaminophen 500 mg oral tablet: 2 tab(s) orally every 8 hours For pain control. Can transition to as needed once pain is improved.  amLODIPine 5 mg oral tablet: 1 tab(s) orally once a day  aspirin 81 mg oral delayed release tablet: 1 tab(s) orally 2 times a day  donepezil 10 mg oral tablet: 1 tab(s) orally once a day (at bedtime)  escitalopram 10 mg oral tablet: 1.5 tab(s) orally once a day  levothyroxine 25 mcg (0.025 mg) oral tablet: 1 tab(s) orally once a day (at bedtime)  memantine 10 mg oral tablet: 1 tab(s) orally once a day  Metoprolol Tartrate 25 mg oral tablet: 0.5 tab(s) orally 2 times a day  omeprazole 20 mg oral delayed release capsule: 1 cap(s) orally once a day  oxyCODONE 5 mg oral tablet: 0.5 tab(s) orally every 6 hours as needed for as needed for moderate pain May take 1 whole tablet every 6 hours as needed for severe pain.  polyethylene glycol 3350 oral powder for reconstitution: 17 gram(s) orally once a day (at bedtime)  QUEtiapine 25 mg oral tablet: 1 tab(s) orally once a day  senna leaf extract oral tablet: 2 tab(s) orally once a day (at bedtime)  trospium 20 mg oral tablet: 1 tab(s) orally once a day

## 2024-11-09 NOTE — DISCHARGE NOTE PROVIDER - PROVIDER TOKENS
PROVIDER:[TOKEN:[[id number]]] PROVIDER:[TOKEN:[[id number]],FOLLOWUP:[2 weeks]],PROVIDER:[TOKEN:[[id number]],FOLLOWUP:[2 weeks]]

## 2024-11-09 NOTE — DISCHARGE NOTE PROVIDER - CARE PROVIDER_API CALL
Javier Guillory  Orthopaedic Surgery  [address] [place] [address], [facility] [address]  [place] [address]  Phone: [phone]  Fax: [phone]  Follow Up Time:    Javier Guillory  Orthopaedic Surgery  [address] [place] [address], [address]  [place] [address]  Phone: [phone]  Fax: [phone]  Follow Up Time: 2 weeks    Laureen Conti  Geriatric Medicine  [address] [facility], [address]  [place] [address]  Phone: [phone]  Fax: [phone]  Follow Up Time: 2 weeks

## 2024-11-09 NOTE — BRIEF OPERATIVE NOTE - NSICDXBRIEFPROCEDURE_GEN_ALL_CORE_FT
PROCEDURES:  Open reduction and internal fixation of right patella 09-Nov-2024 14:23:04  Sebastian Paris

## 2024-11-09 NOTE — PROGRESS NOTE ADULT - SUBJECTIVE AND OBJECTIVE BOX
Postop Check    Patient tolerated the procedure well. Patient seen and examined at bedside.  No acute complaints at this time. Pain well controlled. Denies chest pain, shortness of breath, nausea or vomiting.     PE:  Vital Signs Last 24 Hrs  T(C): 36.4 (11-09-24 @ 14:30), Max: 37 (11-09-24 @ 08:13)  T(F): 97.5 (11-09-24 @ 14:30), Max: 98.6 (11-09-24 @ 08:13)  HR: 74 (11-09-24 @ 14:30) (71 - 90)  BP: 131/71 (11-09-24 @ 14:30) (125/72 - 162/88)  BP(mean): 113 (11-08-24 @ 17:57) (113 - 113)  RR: 14 (11-09-24 @ 14:30) (12 - 18)  SpO2: 96% (11-09-24 @ 14:30) (94% - 100%)    General: NAD, resting comfortably in bed  RLE:   Cylinder cast in place, C/D/I  SCD in place bilaterally  Unable to assess calf tenderness  Motor: + EHL/FHL/TA/GSC  Sensory: SILT SPN/DPN/Kendell/Saph/Tib  DP/PT 2+      A/P:  89y f s/p R patellar ORIF POD 0  -PT/OT  -WBAT to RLE in Cylinder cast  -Pain Control  -DVT ppx: start POD1- A81 BID  -Continue perioperative abx x 24 hours  -FU AM Labs  -Rest, ice, compress, and elevate the extremity as tolerated  -Incentive Spirometry  -Medical management appreciated  -Dispo per PT  -D/w Dr. Guillory, will update plan as needed

## 2024-11-09 NOTE — CONSULT NOTE ADULT - SUBJECTIVE AND OBJECTIVE BOX
CHIEF COMPLAINT: Knee pain    HPI:  89 year old woman with a history of dementia, hypothyroidism, GERD, HTN, osteoporosis who presented to the ER following a trip/fall.  She was found to have a patellar fracture and surgery is planned.  Cardiology called for pre-op examination.  She presently has no complaints; no knee pain (at rest / in bed) and no chest discomfort, dyspnea, palpitations. Chart notes indicate ancillary history from patient's daughter: no history of heart disease.    PAST MEDICAL / SURGICAL HISTORY:  Dementia  HLD (hyperlipidemia)  Pelvic fracture  Hypothyroidism  Anxiety  H/O constipation  H/O urinary incontinence  GERD (gastroesophageal reflux disease)  No significant past surgical history    SOCIAL HISTORY:   Alcohol: Denied  Smoking: Nonsmoker    FAMILY HISTORY: Patient unable to provide medical history (Father, Mother)    MEDICATIONS  (STANDING):  amLODIPine   Tablet 5 milliGRAM(s) Oral daily  aspirin enteric coated 81 milliGRAM(s) Oral daily  donepezil 10 milliGRAM(s) Oral at bedtime  escitalopram 15 milliGRAM(s) Oral daily  influenza  Vaccine (HIGH DOSE) 0.5 milliLiter(s) IntraMuscular once  levothyroxine 25 MICROGram(s) Oral daily  memantine 10 milliGRAM(s) Oral daily  oxybutynin 5 milliGRAM(s) Oral two times a day  pantoprazole    Tablet 40 milliGRAM(s) Oral before breakfast  potassium chloride    Tablet ER 40 milliEquivalent(s) Oral once  QUEtiapine 25 milliGRAM(s) Oral daily  sodium chloride 0.9%. 1000 milliLiter(s) (100 mL/Hr) IV Continuous <Continuous>    MEDICATIONS  (PRN):  acetaminophen     Tablet .. 650 milliGRAM(s) Oral every 6 hours PRN Temp greater or equal to 38C (100.4F), Mild Pain (1 - 3)  aluminum hydroxide/magnesium hydroxide/simethicone Suspension 30 milliLiter(s) Oral every 4 hours PRN Dyspepsia  melatonin 3 milliGRAM(s) Oral at bedtime PRN Insomnia  morphine  - Injectable 2 milliGRAM(s) IV Push every 4 hours PRN Severe Pain (7 - 10)  morphine  - Injectable 1 milliGRAM(s) IV Push every 4 hours PRN Moderate Pain (4 - 6)  ondansetron Injectable 4 milliGRAM(s) IV Push every 8 hours PRN Nausea and/or Vomiting    Allergies: No Known Allergies    REVIEW OF SYSTEMS:  CONSTITUTIONAL: No fevers or chills  Eyes: No visual changes  NECK: No pain or stiffness  RESPIRATORY: No shortness of breath  CARDIOVASCULAR: No chest pain  GASTROINTESTINAL: No abdominal pain.   GENITOURINARY: No dysuria  NEUROLOGICAL: No numbness.  SKIN: No itching or rash  All other review of systems is negative unless indicated above    VITAL SIGNS:   Vital Signs Last 24 Hrs  T(C): 36.6 (08 Nov 2024 21:45), Max: 36.7 (08 Nov 2024 19:52)  T(F): 97.9 (08 Nov 2024 21:45), Max: 98.1 (08 Nov 2024 19:52)  HR: 84 (08 Nov 2024 21:45) (84 - 90)  BP: 129/76 (08 Nov 2024 21:45) (125/72 - 188/86)  BP(mean): 113 (08 Nov 2024 17:57) (113 - 113)  RR: 18 (08 Nov 2024 21:45) (17 - 18)  SpO2: 99% (08 Nov 2024 21:45) (98% - 100%)  Patient On (Oxygen Delivery Method): room air    PHYSICAL EXAM:  Constitutional: NAD, awake and alert  HEENT:  EOMI,  Pupils round, No oral cyanosis.  Pulmonary: Non-labored, breath sounds are clear bilaterally  Cardiovascular: S1 and S2, regular rate and rhythm, systolic murmur  Gastrointestinal: Bowel Sounds present, soft, nontender.   Lymph:  No cervical lymphadenopathy.  Neurological: Alert, no focal deficits  Skin: No rashes.  Psych:  Mood & affect appropriate    LABS:              11.3   6.61  )-----------( 269                 34.6                   139    |  106    |  14     ----------------------------<  114    3.4     |  30     |  0.91     Ca    8.7        09 Nov 2024 06:18    TPro  7.8    /  Alb  3.6    /  TBili  0.3    /  DBili  x      /  AST  20     /  ALT  14     /  AlkPhos  61     08 Nov 2024 13:30    PT/INR - ( 09 Nov 2024 06:18 )   PT: 12.2 sec;   INR: 1.06 ratio    PTT - ( 09 Nov 2024 06:18 )  PTT:33.5 sec    TroponinI hsT: <-107.71, <-116.48, <-108.59    ECG 11/8 @ 14:34. NSR, baseline artifact, nonspecific T wave abnormality / flattening  ECG 11/9 @ 7:50. NSR with 1st degree AV block, nonspecific T wave abnormality    TTE Echo Complete w/o Contrast w/ Doppler (01.17.23 @ 12:19):   The mitral valve leaflets appear thickened.   EA reversal of the mitral inflow consistent with reduced compliance of the left ventricle.   Moderate (2+) mitral regurgitation is present.   The aortic valve is well visualized, appears calcified. Valve opening seems to be normal.   Mild to Moderate aortic regurgitation is present.   Normal appearing tricuspid valve structure. Mild to Moderate Tricuspid regurgitation is present.   Mild pulmonary hypertension.   Pulmonic valve not well seen. No pulmonic valvular regurgitation is seen.   Normal appearing left atrium.   The left ventricle is normal in size, wall thickness, wall motion and contractility. Estimated left ventricular ejection fraction is 55-60 %.   Normal appearing right atrium.   Normal appearing right ventricle structure and function.

## 2024-11-09 NOTE — PROGRESS NOTE ADULT - SUBJECTIVE AND OBJECTIVE BOX
Patient seen and examined at bedside. Pain well controlled with medication. Patient denies any numbness, tingling, weakness, or any other orthopaedic complaint. Denies N/V/CP/SOB.    LABS:                        11.3   6.61  )-----------( 269      ( 09 Nov 2024 06:18 )             34.6     11-09    139  |  106  |  14  ----------------------------<  114[H]  3.4[L]   |  30  |  0.91    Ca    8.7      09 Nov 2024 06:18    TPro  7.8  /  Alb  3.6  /  TBili  0.3  /  DBili  x   /  AST  20  /  ALT  14  /  AlkPhos  61  11-08    PT/INR - ( 09 Nov 2024 06:18 )   PT: 12.2 sec;   INR: 1.06 ratio    PTT - ( 09 Nov 2024 06:18 )  PTT:33.5 sec    Urinalysis Basic - ( 09 Nov 2024 06:18 )  Color: x / Appearance: x / SG: x / pH: x  Gluc: 114 mg/dL / Ketone: x  / Bili: x / Urobili: x   Blood: x / Protein: x / Nitrite: x   Leuk Esterase: x / RBC: x / WBC x   Sq Epi: x / Non Sq Epi: x / Bacteria: x    VITAL SIGNS:  T(C): 36.6 (11-08-24 @ 21:45), Max: 36.7 (11-08-24 @ 19:52)  HR: 84 (11-08-24 @ 21:45) (84 - 90)  BP: 129/76 (11-08-24 @ 21:45) (125/72 - 188/86)  RR: 18 (11-08-24 @ 21:45) (17 - 18)  SpO2: 99% (11-08-24 @ 21:45) (98% - 100%)    Physical Exam:  PE right lower extremity:  +mild-moderate swelling over patella, superficial abrasion anterior patella  +TTP over patella with palpable defect  Pt unable to SLR 2' pain and weakness  No pain with axial loading, no pain with log roll  Moving ankle and all toes,   +EHL/FHL/TA/GS.   Sensory: +SPN/DPN/SAPH/DOT/TIB  + DP/PT pulses    Assessment:  89y Female with a right patella fracture    Plan:  Patient advised that surgical fixation of right patella fracture with ORIF will be necessary.  Surgical procedure was discussed in detail with patient/patients daughter including all R/B/As. pt daughter expressed understanding and consent for surgery was obtained via daughter the HCP; Elena [phone]  Admit to Medicine for medical optimization and clearance  f/u Medical/Cardiac clearance  f/u labs/imaging  WBAT RLE with Bulky Sears Knee Immobilizer.  Pain control.  Ice application.  RLE elevation.  NPO  HOLD DVT ppx  plan for OR today pending medical optimization and clearance  IVF while NPO    Case discussed with  who agrees with plan.

## 2024-11-09 NOTE — DISCHARGE NOTE PROVIDER - NSDCCAREPROVSEEN_GEN_ALL_CORE_FT
Javier Guillory Lia Hadley (Medicine)  Javier Denise (Cardiology)  Eden Mallory (Palliative Care Team)  Javier Guillory (Orthopedic Surgery)  Harinder Mike (Medicine)  Alexandro Mcclendon (Medicine)  Eulalio Cartwright (Medicine)  SurgVinicio rivers (Medicine)

## 2024-11-09 NOTE — DISCHARGE NOTE PROVIDER - HOSPITAL COURSE
89 yo woman with dementia, HTN, hypothyroidism, GERD, osteoporosis, presented for further evaluation and management after a trip and fall, which resulted in R knee pain and swelling, difficulty with ambulating. Patient was found to have an acute comminuted displaced right patellar fracture. Admitted to Medicine with Orthopedic Surgery following.     Fall, traumatic R patellar fracture  S/P ORIF 11/9. Tolerated procedure well. Orthopedic Surgery input appreciated. WBAT to RLE in cylinder cast. Pain control as needed. DVT ppx w/ ASA 81mg BID. PT/OT recommending LEOPOLDO. Continue restorative therapy sessions. Outpatient follow-up with Dr Guillory.     Elevated troponin  Mild troponin elevation in the absence of ACS and with no significant rise/fall on serial assays; similar elevation during 1/2023 hospitalization; ECGs without dynamic change.  Per pt's daughter, Mrs. Mendoza has not described any recent chest pain and there is no history of heart disease.  No further work-up planned at this time.    HTN  BP controlled. Continue amlodipine and metoprolol    Hypothyroidism  Stable. Continue levothyroxine    GERD  Stable. Continue pantoprazole    Dementia with depression  Stable. Continue to reorient frequently. Continue donepezil, memantine and escitalopram.    Mild hypophosphatemia  Replete phos to goal ~3

## 2024-11-09 NOTE — DISCHARGE NOTE PROVIDER - CARE PROVIDERS DIRECT ADDRESSES
[email or web address]

## 2024-11-09 NOTE — CONSULT NOTE ADULT - PROBLEM SELECTOR RECOMMENDATION 2
Mild troponin elevation in the absence of ACS and with no significant rise/fall on serial assays; similar elevation during 1/2023 hospitalization; ECGs without dynamic change.  I spoke to her daughter: Mrs. Mendoza has not described any recent chest pain and there is no history of heart disease. No further work-up planned at this time.

## 2024-11-10 PROCEDURE — 99232 SBSQ HOSP IP/OBS MODERATE 35: CPT

## 2024-11-10 PROCEDURE — 99233 SBSQ HOSP IP/OBS HIGH 50: CPT

## 2024-11-10 RX ORDER — CEFAZOLIN SODIUM 1 G
2000 VIAL (EA) INJECTION EVERY 8 HOURS
Refills: 0 | Status: COMPLETED | OUTPATIENT
Start: 2024-11-10 | End: 2024-11-10

## 2024-11-10 RX ORDER — CEFAZOLIN SODIUM 1 G
2000 VIAL (EA) INJECTION EVERY 8 HOURS
Refills: 0 | Status: DISCONTINUED | OUTPATIENT
Start: 2024-11-10 | End: 2024-11-10

## 2024-11-10 RX ADMIN — Medication 25 MICROGRAM(S): at 06:29

## 2024-11-10 RX ADMIN — SODIUM CHLORIDE 100 MILLILITER(S): 9 INJECTION, SOLUTION INTRAMUSCULAR; INTRAVENOUS; SUBCUTANEOUS at 09:28

## 2024-11-10 RX ADMIN — POLYETHYLENE GLYCOL 3350 17 GRAM(S): 17 POWDER, FOR SOLUTION ORAL at 21:54

## 2024-11-10 RX ADMIN — ESCITALOPRAM 15 MILLIGRAM(S): 10 TABLET, FILM COATED ORAL at 09:23

## 2024-11-10 RX ADMIN — Medication 2000 MILLIGRAM(S): at 06:51

## 2024-11-10 RX ADMIN — Medication 2 TABLET(S): at 21:53

## 2024-11-10 RX ADMIN — Medication 2000 MILLIGRAM(S): at 13:31

## 2024-11-10 RX ADMIN — DONEPEZIL HYDROCHLORIDE 10 MILLIGRAM(S): 23 TABLET ORAL at 21:53

## 2024-11-10 RX ADMIN — OXYBUTYNIN CHLORIDE 5 MILLIGRAM(S): 5 TABLET ORAL at 06:29

## 2024-11-10 RX ADMIN — OXYCODONE HYDROCHLORIDE 5 MILLIGRAM(S): 30 TABLET ORAL at 03:12

## 2024-11-10 RX ADMIN — Medication 81 MILLIGRAM(S): at 06:29

## 2024-11-10 RX ADMIN — Medication 81 MILLIGRAM(S): at 17:42

## 2024-11-10 RX ADMIN — PANTOPRAZOLE SODIUM 40 MILLIGRAM(S): 40 TABLET, DELAYED RELEASE ORAL at 06:29

## 2024-11-10 RX ADMIN — OXYCODONE HYDROCHLORIDE 5 MILLIGRAM(S): 30 TABLET ORAL at 15:48

## 2024-11-10 RX ADMIN — Medication 5 MILLIGRAM(S): at 09:24

## 2024-11-10 RX ADMIN — Medication 400 MILLIGRAM(S): at 21:54

## 2024-11-10 RX ADMIN — OXYCODONE HYDROCHLORIDE 5 MILLIGRAM(S): 30 TABLET ORAL at 14:48

## 2024-11-10 RX ADMIN — QUETIAPINE FUMARATE 25 MILLIGRAM(S): 200 TABLET ORAL at 09:24

## 2024-11-10 RX ADMIN — MEMANTINE HYDROCHLORIDE 10 MILLIGRAM(S): 21 CAPSULE, EXTENDED RELEASE ORAL at 09:24

## 2024-11-10 RX ADMIN — OXYBUTYNIN CHLORIDE 5 MILLIGRAM(S): 5 TABLET ORAL at 17:41

## 2024-11-10 NOTE — PROGRESS NOTE ADULT - PROBLEM SELECTOR PLAN 2
Mild troponin elevation in the absence of ACS and with no significant rise/fall on serial assays; similar elevation during 1/2023 hospitalization; ECGs without dynamic change.  Per pt's daughter, Mrs. Mendoza has not described any recent chest pain and there is no history of heart disease.  No further work-up planned at this time.

## 2024-11-10 NOTE — PROGRESS NOTE ADULT - SUBJECTIVE AND OBJECTIVE BOX
HOSPITALIST ATTENDING PROGRESS NOTE     Chart and meds reviewed. Patient seen and examined       Interval Hx/Events: Pt seen and evaluated. Plan for OR today     11/10: Pt seen and evaluated. s/p R patellar ORIF    All other systems and founds to be negative with exception of what has been described above.     PHYSICAL EXAM:  Vital Signs Last 24 Hrs  T(C): 37.1 (10 Nov 2024 09:08), Max: 37.1 (2024 17:26)  T(F): 98.8 (10 Nov 2024 09:08), Max: 98.8 (2024 17:26)  HR: 100 (10 Nov 2024 09:08) (71 - 100)  BP: 159/69 (10 Nov 2024 09:08) (131/71 - 162/88)  RR: 18 (10 Nov 2024 09:08) (12 - 18)  SpO2: 99% (10 Nov 2024 09:08) (94% - 100%)    Parameters below as of 10 Nov 2024 09:08  Patient On (Oxygen Delivery Method): room air          Daily     Daily Weight in k.9 (2024 21:45)    GEN: NAD   HEENT: EOMI,  moist mucous membranes  NECK : Soft and supple, no JVD  LUNG: CTABL, No wheezing, rales or rhonchi  CVS: S1S2+, RRR, no M/G/R  GI: BS+, soft, NT/ND, no guarding, no rebound  EXTREMITIES: No peripheral edema, RLE in cast   VASCULAR: 2+ peripheral pulses  NEURO: AAOx1-2, grossly non-focal   SKIN: No rashes    HOME MEDICATIONS:  Home Medications:  amLODIPine 5 mg oral tablet: 1 tab(s) orally once a day (2024 15:02)  donepezil 10 mg oral tablet: 1 tab(s) orally once a day (at bedtime) (2024 15:01)  escitalopram 10 mg oral tablet: 1.5 tab(s) orally once a day (2024 14:57)  levothyroxine 25 mcg (0.025 mg) oral tablet: 1 tab(s) orally once a day (at bedtime) (2024 15:01)  memantine 10 mg oral tablet: 1 tab(s) orally once a day (2024 15:01)  omeprazole 20 mg oral delayed release capsule: 1 cap(s) orally once a day (2024 15:01)  QUEtiapine 25 mg oral tablet: 1 tab(s) orally (2024 15:03)  trospium 20 mg oral tablet: 1 tab(s) orally once a day (2024 15:01)      MEDICATIONS  MEDICATIONS  (STANDING):  acetaminophen   IVPB .. 1000 milliGRAM(s) IV Intermittent once  amLODIPine   Tablet 5 milliGRAM(s) Oral daily  aspirin enteric coated 81 milliGRAM(s) Oral two times a day  ceFAZolin  Injectable. 2000 milliGRAM(s) IV Push every 8 hours  donepezil 10 milliGRAM(s) Oral at bedtime  escitalopram 15 milliGRAM(s) Oral daily  influenza  Vaccine (HIGH DOSE) 0.5 milliLiter(s) IntraMuscular once  levothyroxine 25 MICROGram(s) Oral daily  memantine 10 milliGRAM(s) Oral daily  oxybutynin 5 milliGRAM(s) Oral two times a day  pantoprazole    Tablet 40 milliGRAM(s) Oral before breakfast  polyethylene glycol 3350 17 Gram(s) Oral at bedtime  QUEtiapine 25 milliGRAM(s) Oral daily  senna 2 Tablet(s) Oral at bedtime  sodium chloride 0.9%. 1000 milliLiter(s) (100 mL/Hr) IV Continuous <Continuous>  tranexamic acid IVPB 1000 milliGRAM(s) IV Intermittent once  tranexamic acid IVPB 1000 milliGRAM(s) IV Intermittent once    MEDICATIONS  (PRN):  acetaminophen     Tablet .. 650 milliGRAM(s) Oral every 6 hours PRN Temp greater or equal to 38C (100.4F), Mild Pain (1 - 3)  aluminum hydroxide/magnesium hydroxide/simethicone Suspension 30 milliLiter(s) Oral four times a day PRN Indigestion  aluminum hydroxide/magnesium hydroxide/simethicone Suspension 30 milliLiter(s) Oral every 4 hours PRN Dyspepsia  magnesium hydroxide Suspension 30 milliLiter(s) Oral daily PRN Constipation  melatonin 3 milliGRAM(s) Oral at bedtime PRN Insomnia  morphine  - Injectable 1 milliGRAM(s) IV Push every 4 hours PRN Moderate Pain (4 - 6)  morphine  - Injectable 2 milliGRAM(s) IV Push every 4 hours PRN Severe Pain (7 - 10)  ondansetron Injectable 4 milliGRAM(s) IV Push every 8 hours PRN Nausea and/or Vomiting  ondansetron Injectable 4 milliGRAM(s) IV Push every 6 hours PRN Nausea and/or Vomiting  oxyCODONE    IR 5 milliGRAM(s) Oral every 4 hours PRN Severe Pain (7 - 10)  oxyCODONE    IR 2.5 milliGRAM(s) Oral every 4 hours PRN Moderate Pain (4 - 6)  traMADol 25 milliGRAM(s) Oral every 6 hours PRN Mild Pain (1 - 3)        LABS: All Labs Reviewed:                        9.9    6.37  )-----------( 234      ( 2024 13:54 )             30.5       138  |  107  |  12  ----------------------------<  145[H]  3.8   |  25  |  0.92    Ca    7.8[L]      2024 13:54    TPro  7.8  /  Alb  3.6  /  TBili  0.3  /  DBili  x   /  AST  20  /  ALT  14  /  AlkPhos  61  11-08                     Blood Culture:   I&O's Detail    2024 07:01  -  2024 18:33  --------------------------------------------------------  IN:    Lactated Ringers: 150 mL    Other (mL): 400 mL  Total IN: 550 mL    OUT:    Oral Fluid: 0 mL    Voided (mL): 0 mL  Total OUT: 0 mL    Total NET: 550 mL        CAPILLARY BLOOD GLUCOSE    CARDIOLOGY TESTING   EKG: reviewed     ECHO       RADIOLOGY: reviewed

## 2024-11-10 NOTE — PHYSICAL THERAPY INITIAL EVALUATION ADULT - WORK/LEISURE ACTIVITY, REHAB EVAL
normally very active for age & attended [facility]/independent

## 2024-11-10 NOTE — PROGRESS NOTE ADULT - SUBJECTIVE AND OBJECTIVE BOX
CHIEF COMPLAINT: Knee pain    HPI:  89 year old woman with a history of dementia, hypothyroidism, GERD, HTN, osteoporosis who presented to the ER following a trip/fall.  She was found to have a patellar fracture and surgery is planned.  Cardiology called for pre-op examination.  She presently has no complaints; no knee pain (at rest / in bed) and no chest discomfort, dyspnea, palpitations. Chart notes indicate ancillary history from patient's daughter: no history of heart disease.      11/10/24:  ( used ID #[id number])  Pt sitting up in bed in NAD.  Denies CP, SOB or any CV complaints.  Pt is s/p R patellar ORIF yesterday.  Tele:  SR-ST 60-100s, occ PAC.      PAST MEDICAL / SURGICAL HISTORY:  Dementia  HLD (hyperlipidemia)  Pelvic fracture  Hypothyroidism  Anxiety  H/O constipation  H/O urinary incontinence  GERD (gastroesophageal reflux disease)  No significant past surgical history    SOCIAL HISTORY:   Alcohol: Denied  Smoking: Nonsmoker    FAMILY HISTORY:   Patient unable to provide medical history (Father, Mother)    Allergies:   No Known Allergies      MEDICATIONS  Home Medications:  amLODIPine 5 mg oral tablet: 1 tab(s) orally once a day (08 Nov 2024 15:02)  donepezil 10 mg oral tablet: 1 tab(s) orally once a day (at bedtime) (08 Nov 2024 15:01)  escitalopram 10 mg oral tablet: 1.5 tab(s) orally once a day (08 Nov 2024 14:57)  levothyroxine 25 mcg (0.025 mg) oral tablet: 1 tab(s) orally once a day (at bedtime) (08 Nov 2024 15:01)  memantine 10 mg oral tablet: 1 tab(s) orally once a day (08 Nov 2024 15:01)  omeprazole 20 mg oral delayed release capsule: 1 cap(s) orally once a day (08 Nov 2024 15:01)  QUEtiapine 25 mg oral tablet: 1 tab(s) orally (08 Nov 2024 15:03)  trospium 20 mg oral tablet: 1 tab(s) orally once a day (08 Nov 2024 15:01)    MEDICATIONS  (STANDING):  acetaminophen   IVPB .. 1000 milliGRAM(s) IV Intermittent once  amLODIPine   Tablet 5 milliGRAM(s) Oral daily  aspirin enteric coated 81 milliGRAM(s) Oral two times a day  ceFAZolin  Injectable. 2000 milliGRAM(s) IV Push every 8 hours  donepezil 10 milliGRAM(s) Oral at bedtime  escitalopram 15 milliGRAM(s) Oral daily  influenza  Vaccine (HIGH DOSE) 0.5 milliLiter(s) IntraMuscular once  levothyroxine 25 MICROGram(s) Oral daily  memantine 10 milliGRAM(s) Oral daily  oxybutynin 5 milliGRAM(s) Oral two times a day  pantoprazole    Tablet 40 milliGRAM(s) Oral before breakfast  polyethylene glycol 3350 17 Gram(s) Oral at bedtime  QUEtiapine 25 milliGRAM(s) Oral daily  senna 2 Tablet(s) Oral at bedtime  sodium chloride 0.9%. 1000 milliLiter(s) (100 mL/Hr) IV Continuous <Continuous>  tranexamic acid IVPB 1000 milliGRAM(s) IV Intermittent once  tranexamic acid IVPB 1000 milliGRAM(s) IV Intermittent once    MEDICATIONS  (PRN):  acetaminophen     Tablet .. 650 milliGRAM(s) Oral every 6 hours PRN Temp greater or equal to 38C (100.4F), Mild Pain (1 - 3)  aluminum hydroxide/magnesium hydroxide/simethicone Suspension 30 milliLiter(s) Oral every 4 hours PRN Dyspepsia  aluminum hydroxide/magnesium hydroxide/simethicone Suspension 30 milliLiter(s) Oral four times a day PRN Indigestion  magnesium hydroxide Suspension 30 milliLiter(s) Oral daily PRN Constipation  melatonin 3 milliGRAM(s) Oral at bedtime PRN Insomnia  morphine  - Injectable 2 milliGRAM(s) IV Push every 4 hours PRN Severe Pain (7 - 10)  morphine  - Injectable 1 milliGRAM(s) IV Push every 4 hours PRN Moderate Pain (4 - 6)  ondansetron Injectable 4 milliGRAM(s) IV Push every 6 hours PRN Nausea and/or Vomiting  ondansetron Injectable 4 milliGRAM(s) IV Push every 8 hours PRN Nausea and/or Vomiting  oxyCODONE    IR 2.5 milliGRAM(s) Oral every 4 hours PRN Moderate Pain (4 - 6)  oxyCODONE    IR 5 milliGRAM(s) Oral every 4 hours PRN Severe Pain (7 - 10)  traMADol 25 milliGRAM(s) Oral every 6 hours PRN Mild Pain (1 - 3)      Vital Signs Last 24 Hrs  T(C): 37.1 (10 Nov 2024 09:08), Max: 37.1 (09 Nov 2024 17:26)  T(F): 98.8 (10 Nov 2024 09:08), Max: 98.8 (09 Nov 2024 17:26)  HR: 100 (10 Nov 2024 09:08) (71 - 100)  BP: 159/69 (10 Nov 2024 09:08) (131/71 - 162/88)  BP(mean): --  RR: 18 (10 Nov 2024 09:08) (12 - 18)  SpO2: 99% (10 Nov 2024 09:08) (94% - 100%)    Parameters below as of 10 Nov 2024 09:08  Patient On (Oxygen Delivery Method): room air      PHYSICAL EXAM:  Constitutional: NAD, awake and alert  Pulmonary: Non-labored, breath sounds are clear bilaterally  Cardiovascular: S1 and S2, regular rate and rhythm, systolic murmur; LLE no edema  Gastrointestinal: Bowel Sounds present, soft, nontender.   Neurological: Alert, awake, pleasant, oriented to person only; no focal deficits  Skin: No rashes.  Musculoskeletal:  RLE with cast  Psych:  Mood & affect appropriate      LABS:                         9.9    6.37  )-----------( 234      ( 09 Nov 2024 13:54 )             30.5                11.3   6.61  )-----------( 269                 34.6   11-09    138  |  107  |  12  ----------------------------<  145[H]  3.8   |  25  |  0.92    Ca    7.8[L]      09 Nov 2024 13:54    TPro  7.8  /  Alb  3.6  /  TBili  0.3  /  DBili  x   /  AST  20  /  ALT  14  /  AlkPhos  61  11-08                  139    |  106    |  14     ----------------------------<  114    3.4     |  30     |  0.91     Ca    8.7        09 Nov 2024 06:18    TPro  7.8    /  Alb  3.6    /  TBili  0.3    /  DBili  x      /  AST  20     /  ALT  14     /  AlkPhos  61     08 Nov 2024 13:30    PT/INR - ( 09 Nov 2024 06:18 )   PT: 12.2 sec;   INR: 1.06 ratio    PTT - ( 09 Nov 2024 06:18 )  PTT:33.5 sec    TroponinI hsT: <-107.71, <-116.48, <-108.59    ECG 11/8 @ 14:34. NSR, baseline artifact, nonspecific T wave abnormality / flattening  ECG 11/9 @ 7:50. NSR with 1st degree AV block, nonspecific T wave abnormality    TTE Echo Complete w/o Contrast w/ Doppler (01.17.23 @ 12:19):   The mitral valve leaflets appear thickened.   EA reversal of the mitral inflow consistent with reduced compliance of the left ventricle.   Moderate (2+) mitral regurgitation is present.   The aortic valve is well visualized, appears calcified. Valve opening seems to be normal.   Mild to Moderate aortic regurgitation is present.   Normal appearing tricuspid valve structure. Mild to Moderate Tricuspid regurgitation is present.   Mild pulmonary hypertension.   Pulmonic valve not well seen. No pulmonic valvular regurgitation is seen.   Normal appearing left atrium.   The left ventricle is normal in size, wall thickness, wall motion and contractility. Estimated left ventricular ejection fraction is 55-60 %.   Normal appearing right atrium.   Normal appearing right ventricle structure and function.             REASON FOR VISIT: Post-op cardiac exam s/p clearance for knee surgery    HPI:  89 year old woman with a history of dementia, hypothyroidism, GERD, HTN, osteoporosis who presented to the ER following a trip/fall.  She was found to have a patellar fracture and cardiology was consulted for pre-op exam.    11/10/24:  ( used ID #[id number])  Pt sitting up in bed in NAD.  Denies CP, SOB or any CV complaints.  Pt is s/p R patellar ORIF yesterday.  Tele:  SR-ST 60-100s, occ PAC.      PAST MEDICAL / SURGICAL HISTORY:  Dementia  HLD (hyperlipidemia)  Pelvic fracture  Hypothyroidism  Anxiety  H/O constipation  H/O urinary incontinence  GERD (gastroesophageal reflux disease)  No significant past surgical history    SOCIAL HISTORY:   Alcohol: Denied  Smoking: Nonsmoker    FAMILY HISTORY:   Patient unable to provide medical history (Father, Mother)    Allergies:   No Known Allergies      MEDICATIONS  Home Medications:  amLODIPine 5 mg oral tablet: 1 tab(s) orally once a day (08 Nov 2024 15:02)  donepezil 10 mg oral tablet: 1 tab(s) orally once a day (at bedtime) (08 Nov 2024 15:01)  escitalopram 10 mg oral tablet: 1.5 tab(s) orally once a day (08 Nov 2024 14:57)  levothyroxine 25 mcg (0.025 mg) oral tablet: 1 tab(s) orally once a day (at bedtime) (08 Nov 2024 15:01)  memantine 10 mg oral tablet: 1 tab(s) orally once a day (08 Nov 2024 15:01)  omeprazole 20 mg oral delayed release capsule: 1 cap(s) orally once a day (08 Nov 2024 15:01)  QUEtiapine 25 mg oral tablet: 1 tab(s) orally (08 Nov 2024 15:03)  trospium 20 mg oral tablet: 1 tab(s) orally once a day (08 Nov 2024 15:01)    MEDICATIONS  (STANDING):  acetaminophen   IVPB .. 1000 milliGRAM(s) IV Intermittent once  amLODIPine   Tablet 5 milliGRAM(s) Oral daily  aspirin enteric coated 81 milliGRAM(s) Oral two times a day  ceFAZolin  Injectable. 2000 milliGRAM(s) IV Push every 8 hours  donepezil 10 milliGRAM(s) Oral at bedtime  escitalopram 15 milliGRAM(s) Oral daily  influenza  Vaccine (HIGH DOSE) 0.5 milliLiter(s) IntraMuscular once  levothyroxine 25 MICROGram(s) Oral daily  memantine 10 milliGRAM(s) Oral daily  oxybutynin 5 milliGRAM(s) Oral two times a day  pantoprazole    Tablet 40 milliGRAM(s) Oral before breakfast  polyethylene glycol 3350 17 Gram(s) Oral at bedtime  QUEtiapine 25 milliGRAM(s) Oral daily  senna 2 Tablet(s) Oral at bedtime  sodium chloride 0.9%. 1000 milliLiter(s) (100 mL/Hr) IV Continuous <Continuous>  tranexamic acid IVPB 1000 milliGRAM(s) IV Intermittent once  tranexamic acid IVPB 1000 milliGRAM(s) IV Intermittent once    MEDICATIONS  (PRN):  acetaminophen     Tablet .. 650 milliGRAM(s) Oral every 6 hours PRN Temp greater or equal to 38C (100.4F), Mild Pain (1 - 3)  aluminum hydroxide/magnesium hydroxide/simethicone Suspension 30 milliLiter(s) Oral every 4 hours PRN Dyspepsia  aluminum hydroxide/magnesium hydroxide/simethicone Suspension 30 milliLiter(s) Oral four times a day PRN Indigestion  magnesium hydroxide Suspension 30 milliLiter(s) Oral daily PRN Constipation  melatonin 3 milliGRAM(s) Oral at bedtime PRN Insomnia  morphine  - Injectable 2 milliGRAM(s) IV Push every 4 hours PRN Severe Pain (7 - 10)  morphine  - Injectable 1 milliGRAM(s) IV Push every 4 hours PRN Moderate Pain (4 - 6)  ondansetron Injectable 4 milliGRAM(s) IV Push every 6 hours PRN Nausea and/or Vomiting  ondansetron Injectable 4 milliGRAM(s) IV Push every 8 hours PRN Nausea and/or Vomiting  oxyCODONE    IR 2.5 milliGRAM(s) Oral every 4 hours PRN Moderate Pain (4 - 6)  oxyCODONE    IR 5 milliGRAM(s) Oral every 4 hours PRN Severe Pain (7 - 10)  traMADol 25 milliGRAM(s) Oral every 6 hours PRN Mild Pain (1 - 3)      Vital Signs Last 24 Hrs  T(C): 37.1 (10 Nov 2024 09:08), Max: 37.1 (09 Nov 2024 17:26)  T(F): 98.8 (10 Nov 2024 09:08), Max: 98.8 (09 Nov 2024 17:26)  HR: 100 (10 Nov 2024 09:08) (71 - 100)  BP: 159/69 (10 Nov 2024 09:08) (131/71 - 162/88)  RR: 18 (10 Nov 2024 09:08) (12 - 18)  SpO2: 99% (10 Nov 2024 09:08) (94% - 100%)  Patient On (Oxygen Delivery Method): room air    PHYSICAL EXAM:  Constitutional: NAD, awake and alert  Pulmonary: Non-labored, breath sounds are clear bilaterally  Cardiovascular: S1 and S2, regular rate and rhythm, systolic murmur; LLE no edema  Gastrointestinal: Bowel Sounds present, soft, nontender.   Neurological: Alert, awake, pleasant, oriented to person only; no focal deficits  Skin: No rashes.  Musculoskeletal:  RLE with cast  Psych:  Mood & affect appropriate    LABS:                    9.9    6.37  )-----------( 234      ( 09 Nov 2024 13:54 )             30.5     138  |  107  |  12  ----------------------------<  145[H]  3.8   |  25  |  0.92    Ca    7.8[L]      09 Nov 2024 13:54    TPro  7.8  /  Alb  3.6  /  TBili  0.3  /  DBili  x   /  AST  20  /  ALT  14  /  AlkPhos  61  11-08    TroponinI hsT: <-107.71, <-116.48, <-108.59    ECG 11/8 @ 14:34. NSR, baseline artifact, nonspecific T wave abnormality / flattening  ECG 11/9 @ 7:50. NSR with 1st degree AV block, nonspecific T wave abnormality    TTE Echo Complete w/o Contrast w/ Doppler (01.17.23 @ 12:19):   The mitral valve leaflets appear thickened.   EA reversal of the mitral inflow consistent with reduced compliance of the left ventricle.   Moderate (2+) mitral regurgitation is present.   The aortic valve is well visualized, appears calcified. Valve opening seems to be normal.   Mild to Moderate aortic regurgitation is present.   Normal appearing tricuspid valve structure. Mild to Moderate Tricuspid regurgitation is present.   Mild pulmonary hypertension.   Pulmonic valve not well seen. No pulmonic valvular regurgitation is seen.   Normal appearing left atrium.   The left ventricle is normal in size, wall thickness, wall motion and contractility. Estimated left ventricular ejection fraction is 55-60 %.   Normal appearing right atrium.   Normal appearing right ventricle structure and function.

## 2024-11-10 NOTE — PHYSICAL THERAPY INITIAL EVALUATION ADULT - REHAB POTENTIAL, PT EVAL
Patient unable to answer admission database questions at this time. Attempted to reach primary contact, Alfred Alatorre, no answer at this time. Will attempt again as time allows. good, to achieve stated therapy goals

## 2024-11-10 NOTE — PROGRESS NOTE ADULT - PROBLEM SELECTOR PROBLEM 1
Subjective:     Erendira Pretty is here for follow up of Cirrhosis      HPI  Since Erendira Pretty's last visit he denies any acute issues. He has been feeling well.    No evidence of liver decompensation: no ascites, confusion or GI bleeding.    His wife has been battling colon cancer with liver and lungs mets all resected expect a recent lung met. She is now in a trial.    Review of Systems    Objective:     Physical Exam   Constitutional: He is oriented to person, place, and time. He appears well-developed and well-nourished. No distress.   HENT:   Head: Normocephalic and atraumatic.   Mouth/Throat: Oropharynx is clear and moist. No oropharyngeal exudate.   Eyes: Conjunctivae are normal. Pupils are equal, round, and reactive to light. Right eye exhibits no discharge. Left eye exhibits no discharge. No scleral icterus.   Pulmonary/Chest: Effort normal and breath sounds normal. No respiratory distress. He has no wheezes.   Abdominal: Soft. He exhibits no distension. There is no tenderness.   Musculoskeletal: He exhibits edema (trace LE edema).   Neurological: He is alert and oriented to person, place, and time.   Psychiatric: He has a normal mood and affect. His behavior is normal.   Vitals reviewed.      MELD-Na score: 7 at 10/16/2017  8:42 AM  MELD score: 7 at 10/16/2017  8:42 AM  Calculated from:  Serum Creatinine: 1.1 mg/dL at 10/16/2017  8:42 AM  Serum Sodium: 137 mmol/L at 10/16/2017  8:42 AM  Total Bilirubin: 0.5 mg/dL (Rounded to 1) at 10/16/2017  8:42 AM  INR(ratio): 1.0 at 10/16/2017  8:42 AM  Age: 73 years    WBC   Date Value Ref Range Status   10/16/2017 6.37 3.90 - 12.70 K/uL Final     Hemoglobin   Date Value Ref Range Status   10/16/2017 14.4 14.0 - 18.0 g/dL Final     Hematocrit   Date Value Ref Range Status   10/16/2017 40.9 40.0 - 54.0 % Final     Platelets   Date Value Ref Range Status   10/16/2017 147 (L) 150 - 350 K/uL Final     BUN, Bld   Date Value Ref Range Status   10/16/2017 18 8 - 23 mg/dL  Final     Creatinine   Date Value Ref Range Status   10/16/2017 1.1 0.5 - 1.4 mg/dL Final     Glucose   Date Value Ref Range Status   10/16/2017 114 (H) 70 - 110 mg/dL Final     Calcium   Date Value Ref Range Status   10/16/2017 9.6 8.7 - 10.5 mg/dL Final     Sodium   Date Value Ref Range Status   10/16/2017 137 136 - 145 mmol/L Final     Potassium   Date Value Ref Range Status   10/16/2017 4.5 3.5 - 5.1 mmol/L Final     Chloride   Date Value Ref Range Status   10/16/2017 101 95 - 110 mmol/L Final     Magnesium   Date Value Ref Range Status   02/24/2013 1.7 1.6 - 2.6 mg/dl Final     AST   Date Value Ref Range Status   10/16/2017 29 10 - 40 U/L Final   10/16/2017 29 10 - 40 U/L Final     ALT   Date Value Ref Range Status   10/16/2017 28 10 - 44 U/L Final   10/16/2017 28 10 - 44 U/L Final     Alkaline Phosphatase   Date Value Ref Range Status   10/16/2017 86 55 - 135 U/L Final   10/16/2017 86 55 - 135 U/L Final     Total Bilirubin   Date Value Ref Range Status   10/16/2017 0.5 0.1 - 1.0 mg/dL Final     Comment:     For infants and newborns, interpretation of results should be based  on gestational age, weight and in agreement with clinical  observations.  Premature Infant recommended reference ranges:  Up to 24 hours.............<8.0 mg/dL  Up to 48 hours............<12.0 mg/dL  3-5 days..................<15.0 mg/dL  6-29 days.................<15.0 mg/dL     10/16/2017 0.5 0.1 - 1.0 mg/dL Final     Comment:     For infants and newborns, interpretation of results should be based  on gestational age, weight and in agreement with clinical  observations.  Premature Infant recommended reference ranges:  Up to 24 hours.............<8.0 mg/dL  Up to 48 hours............<12.0 mg/dL  3-5 days..................<15.0 mg/dL  6-29 days.................<15.0 mg/dL       Albumin   Date Value Ref Range Status   10/16/2017 3.8 3.5 - 5.2 g/dL Final   10/16/2017 3.8 3.5 - 5.2 g/dL Final     INR   Date Value Ref Range Status   10/16/2017  1.0 0.8 - 1.2 Final     Comment:     Coumadin Therapy:  2.0 - 3.0 for INR for all indicators except mechanical heart valves  and antiphospholipid syndromes which should use 2.5 - 3.5.           Assessment/Plan:     1. Other cirrhosis of liver    2. Secondary esophageal varices without bleeding      Erendira Pretty is a 73 y.o. male withCirrhosis    Other cirrhosis of liver-low meld, well compensated  -HCC surveillance every 6 months  -Meld labs every 6 months  -     US Abdomen Limited; Future; Expected date: 11/03/2017  -     Comprehensive metabolic panel; Future; Expected date: 11/03/2017  -     CBC auto differential; Future; Expected date: 11/03/2017  -     AFP tumor marker; Future; Expected date: 11/03/2017  -     Protime-INR; Future; Expected date: 11/03/2017    Secondary esophageal varices without bleeding  -EGD 6/2019 for variceal screening    Anna Tomas MD      Preoperative cardiovascular examination

## 2024-11-10 NOTE — PHYSICAL THERAPY INITIAL EVALUATION ADULT - SITTING BALANCE: DYNAMIC
Clinic hours for Dr. Muhammad:  Monday    In Surgery  Tuesday 7:30am - 4:30pm  Wednesday 7:30am - 4:30pm  Thursday       7:30am - 4:30pm  Friday            7:30 - 11am    If you need a refill on your prescription please call your pharmacy and let them know. Please be proactive and call before your medication runs out.    The pharmacy will then contact us for the refill.  Please allow 24-48 hours for the refill to be processed.     If your physician has ordered additional laboratory or radiology testing as part of your ongoing plan of care, please allow 5-7 business days from the day of your lab draw or test for the results to be sent and reviewed by your provider. If your results are critical and require more immediate intervention, you will be contacted sooner. Your results will be conveyed to you via a phone call or letter.    You may be receiving a patient satisfaction survey in the mail.   Please take the time to complete, as your feedback is very important to us.   We strive to make your experience exceptional and your comments help us with that goal.  We look forward to hearing from you.      No nose blowing/strenuous activities for one week. Apply triple antibiotic ointment to cauterized nasal cavity 3 times a day for one week.    
good minus

## 2024-11-10 NOTE — PROGRESS NOTE ADULT - PROBLEM SELECTOR PLAN 1
Pt was seen for prior to R patellar ORIF and cleared from cardiac standpoint.  -  Pt now POD#1; mgnt per Ortho  -  no active CV issues     -  will sign off, please call with questions, pt can see Dr. Denise for outpt cardiology f/u. Pt was seen for prior to R patellar ORIF and cleared from cardiac standpoint.  -  Pt now POD#1; mgnt per Ortho  -  no active CV issues     -  will sign off, please call with questions

## 2024-11-10 NOTE — PROGRESS NOTE ADULT - NS ATTEND AMEND GEN_ALL_CORE FT
Patient seen with NP; she tolerated yesterday's open reduction and internal fixation of right patella.  Will f/u PRN.

## 2024-11-10 NOTE — PHYSICAL THERAPY INITIAL EVALUATION ADULT - GENERAL OBSERVATIONS, REHAB EVAL
Pt seen for 45min PT Eval. Pt rec'd semi supine in bed in NAD on 3E, +tele, LLE +Long leg cast, [place] speaking CNA translating throughout. Pt confused. Pt requires mod Ax1 for bedmobility, trans and amb took 1 step to HOB with min-mod ax2 at RW. Pt sitting without warning. Pt left semi supine in bed in NAD, all needs met, RN aware, +bed alarm.

## 2024-11-10 NOTE — PHYSICAL THERAPY INITIAL EVALUATION ADULT - ADDITIONAL COMMENTS
unable to provide PLOF today. As per CM note: pt attends adult day care 6days a week 24/7 [facility]

## 2024-11-10 NOTE — PHYSICAL THERAPY INITIAL EVALUATION ADULT - ACTIVE RANGE OF MOTION EXAMINATION, REHAB EVAL
RLE limited in long leg cast. LLE tremulous at times/bilateral upper extremity Active ROM was WFL (within functional limits)/Left LE Active ROM was WFL (within functional limits)

## 2024-11-10 NOTE — PHYSICAL THERAPY INITIAL EVALUATION ADULT - MANUAL MUSCLE TESTING RESULTS, REHAB EVAL
Strength is grossly at least 3+/5 throughout BUE/LLE. RLE not tested in cast./grossly assessed due to

## 2024-11-10 NOTE — PROGRESS NOTE ADULT - SUBJECTIVE AND OBJECTIVE BOX
Patient seen and examined at bedside.  No acute complaints at this time. Pain well controlled. Denies chest pain, shortness of breath, nausea or vomiting.     LABS:                        9.9    6.37  )-----------( 234      ( 09 Nov 2024 13:54 )             30.5     11-09    138  |  107  |  12  ----------------------------<  145[H]  3.8   |  25  |  0.92    Ca    7.8[L]      09 Nov 2024 13:54    TPro  7.8  /  Alb  3.6  /  TBili  0.3  /  DBili  x   /  AST  20  /  ALT  14  /  AlkPhos  61  11-08    PT/INR - ( 09 Nov 2024 06:18 )   PT: 12.2 sec;   INR: 1.06 ratio         PTT - ( 09 Nov 2024 06:18 )  PTT:33.5 sec  Urinalysis Basic - ( 09 Nov 2024 13:54 )    Color: x / Appearance: x / SG: x / pH: x  Gluc: 145 mg/dL / Ketone: x  / Bili: x / Urobili: x   Blood: x / Protein: x / Nitrite: x   Leuk Esterase: x / RBC: x / WBC x   Sq Epi: x / Non Sq Epi: x / Bacteria: x        VITAL SIGNS:  T(C): 37 (11-10-24 @ 00:31), Max: 37.1 (11-09-24 @ 17:26)  HR: 89 (11-10-24 @ 00:31) (71 - 89)  BP: 156/74 (11-10-24 @ 00:31) (131/71 - 162/88)  RR: 18 (11-10-24 @ 00:31) (12 - 18)  SpO2: 97% (11-10-24 @ 00:31) (94% - 100%)    General: NAD, resting comfortably in bed  RLE:   Cylinder cast in place, C/D/I  SCD in place bilaterally  Unable to assess calf tenderness  Motor: + EHL/FHL/TA/GSC  Sensory: SILT SPN/DPN/Kendell/Saph/Tib  DP/PT 2+      A/P:  89y f POD 1 s/p R patellar ORIF  -PT/OT: FU PT Recs   -WBAT to RLE in Cylinder cast  -Pain Control  -DVT ppx: start POD1- A81 BID  -Complete perioperative abx x 24 hours  -FU AM Labs  -Rest, ice, compress, and elevate the extremity as tolerated  -Incentive Spirometry  -Medical management appreciated  -Dispo per PT  -D/w Dr. Guillory, will update plan as needed

## 2024-11-10 NOTE — PHYSICAL THERAPY INITIAL EVALUATION ADULT - PERTINENT HX OF CURRENT PROBLEM, REHAB EVAL
88yo female POD 1 s/p R patellar ORIF.    XRAYS: Comminuted fracture of the right patella with moderate separation of the fracture fragments. The femur tibia and fibula appear intact.  CT KNEE: Acute comminuted displaced right patellar fracture including dominant oblique transverse component through the midpole with small more significantly displaced osteochondral fragment along its deep central margin.

## 2024-11-10 NOTE — PROGRESS NOTE ADULT - ASSESSMENT
#Right patellar fracture  -s/p R patellar ORIF (11/09/24_  -PT/OT: FU PT Recs   -WBAT to RLE in Cylinder cast  -Pain Control  -DVT ppx: A81 BID  -Complete perioperative abx x 24 hours  -Rest, ice, compress, and elevate the extremity as tolerated  -Incentive Spirometry  -Orthopedics following: f/u in office at 3wk     #Elevated troponin  -no chest pain  -no acute EKG changes  -seen and evaluated by Cardiology   -cardiology following     #Dementia  -on Seroquel Donepezil, memantine, lexapro    #HTN  -on amlodipine    #VTE Prophylaxis   -ASA 81mg BID     #Advanced Care Directives   -DNR/DNI. MOLST in the chart    #Right patellar fracture  -s/p R patellar ORIF (11/09/24_  -PT/OT: FU PT Recs   -WBAT to RLE in Cylinder cast  -Pain Control  -DVT ppx: A81 BID  -Complete perioperative abx x 24 hours  -Rest, ice, compress, and elevate the extremity as tolerated  -Incentive Spirometry  -Orthopedics following: f/u in office at 3wk     #Anemia, likely acute blood loss anemia   -monitor H&H   -transfuse PRN    #Elevated troponin  -no chest pain  -no acute EKG changes  -seen and evaluated by Cardiology   -cardiology following     #Dementia  -on Seroquel Donepezil, memantine, lexapro    #HTN  -on amlodipine    #VTE Prophylaxis   -ASA 81mg BID     #Advanced Care Directives   -DNR/DNI. MOLST in the chart

## 2024-11-11 LAB
ANION GAP SERPL CALC-SCNC: 3 MMOL/L — LOW (ref 5–17)
BUN SERPL-MCNC: 8 MG/DL — SIGNIFICANT CHANGE UP (ref 7–23)
CALCIUM SERPL-MCNC: 7.8 MG/DL — LOW (ref 8.5–10.1)
CHLORIDE SERPL-SCNC: 108 MMOL/L — SIGNIFICANT CHANGE UP (ref 96–108)
CO2 SERPL-SCNC: 31 MMOL/L — SIGNIFICANT CHANGE UP (ref 22–31)
CREAT SERPL-MCNC: 0.68 MG/DL — SIGNIFICANT CHANGE UP (ref 0.5–1.3)
EGFR: 84 ML/MIN/1.73M2 — SIGNIFICANT CHANGE UP
GLUCOSE SERPL-MCNC: 98 MG/DL — SIGNIFICANT CHANGE UP (ref 70–99)
HCT VFR BLD CALC: 29.2 % — LOW (ref 34.5–45)
HGB BLD-MCNC: 9.6 G/DL — LOW (ref 11.5–15.5)
MAGNESIUM SERPL-MCNC: 2 MG/DL — SIGNIFICANT CHANGE UP (ref 1.6–2.6)
MCHC RBC-ENTMCNC: 29.4 PG — SIGNIFICANT CHANGE UP (ref 27–34)
MCHC RBC-ENTMCNC: 32.9 G/DL — SIGNIFICANT CHANGE UP (ref 32–36)
MCV RBC AUTO: 89.6 FL — SIGNIFICANT CHANGE UP (ref 80–100)
PHOSPHATE SERPL-MCNC: 2.1 MG/DL — LOW (ref 2.5–4.5)
PLATELET # BLD AUTO: 233 K/UL — SIGNIFICANT CHANGE UP (ref 150–400)
POTASSIUM SERPL-MCNC: 3.3 MMOL/L — LOW (ref 3.5–5.3)
POTASSIUM SERPL-SCNC: 3.3 MMOL/L — LOW (ref 3.5–5.3)
RBC # BLD: 3.26 M/UL — LOW (ref 3.8–5.2)
RBC # FLD: 13.8 % — SIGNIFICANT CHANGE UP (ref 10.3–14.5)
SODIUM SERPL-SCNC: 142 MMOL/L — SIGNIFICANT CHANGE UP (ref 135–145)
WBC # BLD: 7.34 K/UL — SIGNIFICANT CHANGE UP (ref 3.8–10.5)
WBC # FLD AUTO: 7.34 K/UL — SIGNIFICANT CHANGE UP (ref 3.8–10.5)

## 2024-11-11 PROCEDURE — 99232 SBSQ HOSP IP/OBS MODERATE 35: CPT

## 2024-11-11 PROCEDURE — 99223 1ST HOSP IP/OBS HIGH 75: CPT

## 2024-11-11 RX ORDER — POTASSIUM CHLORIDE 10 MEQ
20 TABLET, EXTENDED RELEASE ORAL ONCE
Refills: 0 | Status: COMPLETED | OUTPATIENT
Start: 2024-11-11 | End: 2024-11-11

## 2024-11-11 RX ORDER — METOPROLOL TARTRATE 50 MG
12.5 TABLET ORAL
Refills: 0 | Status: DISCONTINUED | OUTPATIENT
Start: 2024-11-11 | End: 2024-11-13

## 2024-11-11 RX ORDER — DIBASIC SODIUM PHOSPHATE, MONOBASIC POTASSIUM PHOSPHATE AND MONOBASIC SODIUM PHOSPHATE 852; 155; 130 MG/1; MG/1; MG/1
1 TABLET ORAL THREE TIMES A DAY
Refills: 0 | Status: COMPLETED | OUTPATIENT
Start: 2024-11-11 | End: 2024-11-13

## 2024-11-11 RX ADMIN — Medication 81 MILLIGRAM(S): at 05:28

## 2024-11-11 RX ADMIN — Medication 5 MILLIGRAM(S): at 09:25

## 2024-11-11 RX ADMIN — Medication 1000 MILLIGRAM(S): at 21:42

## 2024-11-11 RX ADMIN — Medication 1000 MILLIGRAM(S): at 14:08

## 2024-11-11 RX ADMIN — Medication 20 MILLIEQUIVALENT(S): at 09:24

## 2024-11-11 RX ADMIN — PANTOPRAZOLE SODIUM 40 MILLIGRAM(S): 40 TABLET, DELAYED RELEASE ORAL at 05:28

## 2024-11-11 RX ADMIN — Medication 25 MICROGRAM(S): at 05:28

## 2024-11-11 RX ADMIN — OXYCODONE HYDROCHLORIDE 5 MILLIGRAM(S): 30 TABLET ORAL at 14:43

## 2024-11-11 RX ADMIN — DONEPEZIL HYDROCHLORIDE 10 MILLIGRAM(S): 23 TABLET ORAL at 21:42

## 2024-11-11 RX ADMIN — Medication 12.5 MILLIGRAM(S): at 21:42

## 2024-11-11 RX ADMIN — POLYETHYLENE GLYCOL 3350 17 GRAM(S): 17 POWDER, FOR SOLUTION ORAL at 21:42

## 2024-11-11 RX ADMIN — ESCITALOPRAM 15 MILLIGRAM(S): 10 TABLET, FILM COATED ORAL at 09:57

## 2024-11-11 RX ADMIN — OXYBUTYNIN CHLORIDE 5 MILLIGRAM(S): 5 TABLET ORAL at 18:57

## 2024-11-11 RX ADMIN — QUETIAPINE FUMARATE 25 MILLIGRAM(S): 200 TABLET ORAL at 09:24

## 2024-11-11 RX ADMIN — MEMANTINE HYDROCHLORIDE 10 MILLIGRAM(S): 21 CAPSULE, EXTENDED RELEASE ORAL at 09:25

## 2024-11-11 RX ADMIN — DIBASIC SODIUM PHOSPHATE, MONOBASIC POTASSIUM PHOSPHATE AND MONOBASIC SODIUM PHOSPHATE 1 PACKET(S): 852; 155; 130 TABLET ORAL at 21:42

## 2024-11-11 RX ADMIN — OXYCODONE HYDROCHLORIDE 5 MILLIGRAM(S): 30 TABLET ORAL at 15:20

## 2024-11-11 RX ADMIN — Medication 1000 MILLIGRAM(S): at 14:52

## 2024-11-11 RX ADMIN — Medication 81 MILLIGRAM(S): at 18:57

## 2024-11-11 RX ADMIN — OXYBUTYNIN CHLORIDE 5 MILLIGRAM(S): 5 TABLET ORAL at 05:28

## 2024-11-11 RX ADMIN — Medication 2 TABLET(S): at 21:44

## 2024-11-11 NOTE — CONSULT NOTE ADULT - CONVERSATION DETAILS
Elena via phone   discussed goals of care, assist with planning and provide supportive counseling. R  Pt. was residing home with her daughter prior to admission. Pt. attends a Adult  Bilingual program for pts with Dementia 6 days a week at [facility]. Pt. also has 24/7 aides through the Medicaid nursing home diversion waiver program. Pts primary care doctor is Laureen Conti therefore, pts daughter shared that she is very familiar with Palliative care. Pts daughter shared that pt. was doing great at home. She ended up in the hospital after tripping and falling as she walked into her  program.      Goals of care discussed. Pts daughter shared that at this point she would like pt. to go to [facility] to try to regain strength as she was doing very well prior to coming in the hospital. This SW also confirmed DNR/DNI with pts daughter.

## 2024-11-11 NOTE — PROGRESS NOTE ADULT - SUBJECTIVE AND OBJECTIVE BOX
Patient seen and examined at bedside.  No acute complaints at this time. Pain well controlled. Denies chest pain, shortness of breath, nausea or vomiting.     LABS:                        9.9    6.37  )-----------( 234      ( 09 Nov 2024 13:54 )             30.5     11-09    138  |  107  |  12  ----------------------------<  145[H]  3.8   |  25  |  0.92    Ca    7.8[L]      09 Nov 2024 13:54        Urinalysis Basic - ( 09 Nov 2024 13:54 )    Color: x / Appearance: x / SG: x / pH: x  Gluc: 145 mg/dL / Ketone: x  / Bili: x / Urobili: x   Blood: x / Protein: x / Nitrite: x   Leuk Esterase: x / RBC: x / WBC x   Sq Epi: x / Non Sq Epi: x / Bacteria: x        VITAL SIGNS:  T(C): 37 (11-11-24 @ 00:11), Max: 37.9 (11-10-24 @ 22:00)  HR: 83 (11-11-24 @ 00:11) (83 - 100)  BP: 139/58 (11-11-24 @ 00:11) (130/58 - 159/69)  RR: 18 (11-11-24 @ 00:11) (18 - 19)  SpO2: 93% (11-11-24 @ 00:11) (93% - 99%)    General: NAD, resting comfortably in bed  RLE:   Cylinder cast in place, C/D/I  SCD in place bilaterally  Unable to assess calf tenderness  Motor: + EHL/FHL/TA/GSC  Sensory: SILT SPN/DPN/Kendell/Saph/Tib  DP/PT 2+      A/P:  89y f POD 2 s/p R patellar ORIF    -PT/OT: PT Rec [facility]   -WBAT to RLE in Cylinder cast  -Pain Control   -DVT ppx: cont A81 BID  -FU AM Labs   -Rest, ice, compress, and elevate the extremity as tolerated  -Incentive Spirometry  -Medical management appreciated  -Dispo per PT  -D/w Dr. Guillory, will update plan as needed

## 2024-11-11 NOTE — GOALS OF CARE CONVERSATION - ADVANCED CARE PLANNING - CONVERSATION DETAILS
HPI:  89y Female, daughter present at bedside, who presents to the ED with complain of right knee pain, swelling, LROM and difficulty ambulating s/p mechanical trip and fall today while walking into her community center.  (08 Nov 2024 16:08)      PERTINENT PMH REVIEWED:  [ X ] YES [ ] NO           Primary Contact: Pepper Parker     HCP [  ] Surrogate [  X  ] Guardian [   ]    Mental Status: [ ] Alert  [  ] Oriented [  ] Confused [ X ] Lethargic [  ]  Concerns of Depression [  ]- Unable to asses, not identified by family   Anxiety [   ]- Unable to asses, not identified by family   Baseline ADLs (prior to admission):  Independent [ X ] moderately [ ] fully   Dependent   [ ] moderately [ ] fully    Anticipated Grief: Patient [  ] Family [ X ]    Caregiver [place] Assessed: Yes [  ] No [  ]    Congregational: Unknown     Spiritual Concerns: Not identified      Goals of Care: Comfort [  ] Rehabilitation [  X  ] Curative [  ] Life Prolonging [  ]    Previous Services: Adult  Program at [facility], as well as 24/7 aides through the Medicaid nursing home diversion waiver program.    ADVANCE DIRECTIVES: DNR/DNI     Anticipated D/C Plan: [facility]                     Summary:    This SW spoke with pts daughter Elena via phone to discuss goals of care, assist with planning and provide supportive counseling. Role of Palliative SW explained. Pt. was residing home with her daughter prior to admission. Pt. attends a Adult  Bilingual program for pts with Dementia 6 days a week at [facility]. Pt. also has 24/7 aides through the Medicaid nursing home diversion waiver program. Pts primary care doctor is Laureen Conti therefore, pts daughter shared that she is very familiar with Palliative care. Pts daughter shared that pt. was doing great at home. She ended up in the hospital after tripping and falling as she walked into her  program. Feelings explored and support provided.      Goals of care discussed. Pts daughter shared that at this point she would like pt. to go to [facility] to try to regain strength as she was doing very well prior to coming in the hospital. This SW also confirmed DNR/DNI with pts daughter.     Plan at this time is for pt. to likely go to [facility] upon d/c. Emotional support provided. Our team will continue to follow.

## 2024-11-11 NOTE — CONSULT NOTE ADULT - SUBJECTIVE AND OBJECTIVE BOX
HPI:   As per chart review:   "89y Female, daughter present at bedside, who presents to the ED with complain of right knee pain, swelling, LROM and difficulty ambulating s/p mechanical trip and fall today while walking into her community center. Patient with history of dementia, history given by daughter at the bedside. patient ambulates with cane at baseline. She had + head strike but no LOC, bruising noted to anterior forehead.  Patient denies any numbness, tingling or paresthesia. No chest pain. No Sob. As per daughter patient has no history of heart disease.  "       11/11  pt seen and examined.   She appears comfortable and reports not having pain at this time.  Patient and I spoke in her native language. She didn't remember where she was at the time but when reoriented remembered she fell and hurt her leg requiring surgery.     Team also reached out to her daughter today to touch base and discuss hospitalization and goals of care moving forward.   Patient is known to palliative team as she was seen in January.         PAIN: ( ) YES  ( X)  NO     DYSPNEA ( ) Yes ( X) No    PAST MEDICAL & SURGICAL HISTORY:  Dementia      HLD (hyperlipidemia)      Pelvic fracture      Hypothyroidism      Anxiety      H/O constipation      H/O urinary incontinence      GERD (gastroesophageal reflux disease)      No significant past surgical history          SOCIAL HX:    Hx opiate tolerance ( )YES  (x )NO    Baseline ADLs  (Prior to Admission)  ( ) Independent   (x )Dependent    FAMILY HISTORY:  Patient unable to provide medical history (Father, Mother)        Review of Systems:    Anxiety- denies  Depression- denies  Physical Discomfort- comfortable at this time  Dyspnea- pt without resp distress able to speak full sentences denies dyspnea  Constipation- monitor for constation pt not reliable historian   Diarrhea- none  Nausea- none  Vomiting- none  Anorexia-   Weight Loss-   Cough- none  Secretions- none  Fatigue- denies  Weakness- denies  Delirium- baseline dementia    All other systems reviewed and negative  Unable to obtain/Limited due to:      PHYSICAL EXAM:    Vital Signs Last 24 Hrs  T(C): 36.9 (11 Nov 2024 08:00), Max: 37.9 (10 Nov 2024 22:00)  T(F): 98.5 (11 Nov 2024 08:00), Max: 100.3 (10 Nov 2024 22:00)  HR: 81 (11 Nov 2024 08:00) (81 - 100)  BP: 163/74 (11 Nov 2024 08:00) (130/58 - 163/74)  BP(mean): --  RR: 18 (11 Nov 2024 08:00) (18 - 19)  SpO2: 100% (11 Nov 2024 08:00) (93% - 100%)    Parameters below as of 11 Nov 2024 08:00  Patient On (Oxygen Delivery Method): room air      Daily     Daily     PPSV2:40   %  FAST:    General: calm in NAD  Mental Status: awake alert oriented x2  HEENT: eomi, perrl   + echymotic b/l face 2/2 to fall   Lungs: ctabl b/l bs  Cardiac: s1s2 no mgr  GI: soft nontender +BS  : voids  Ext: moves all 4 extremities spontaneously  Neuro: no gross findings  + dementia at baseline      LABS:                        9.6    7.34  )-----------( 233      ( 11 Nov 2024 07:00 )             29.2     11-11    142  |  108  |  8   ----------------------------<  98  3.3[L]   |  31  |  0.68    Ca    7.8[L]      11 Nov 2024 07:00  Phos  2.1     11-11  Mg     2.0     11-11        Albumin: Albumin: 3.6 g/dL (11-08 @ 13:30)      Allergies    No Known Allergies    Intolerances      MEDICATIONS  (STANDING):  acetaminophen     Tablet .. 1000 milliGRAM(s) Oral every 8 hours  amLODIPine   Tablet 5 milliGRAM(s) Oral daily  aspirin enteric coated 81 milliGRAM(s) Oral two times a day  donepezil 10 milliGRAM(s) Oral at bedtime  escitalopram 15 milliGRAM(s) Oral daily  influenza  Vaccine (HIGH DOSE) 0.5 milliLiter(s) IntraMuscular once  levothyroxine 25 MICROGram(s) Oral daily  memantine 10 milliGRAM(s) Oral daily  oxybutynin 5 milliGRAM(s) Oral two times a day  pantoprazole    Tablet 40 milliGRAM(s) Oral before breakfast  polyethylene glycol 3350 17 Gram(s) Oral at bedtime  QUEtiapine 25 milliGRAM(s) Oral daily  senna 2 Tablet(s) Oral at bedtime  sodium chloride 0.9%. 1000 milliLiter(s) (100 mL/Hr) IV Continuous <Continuous>  tranexamic acid IVPB 1000 milliGRAM(s) IV Intermittent once  tranexamic acid IVPB 1000 milliGRAM(s) IV Intermittent once    MEDICATIONS  (PRN):  acetaminophen     Tablet .. 650 milliGRAM(s) Oral every 6 hours PRN Temp greater or equal to 38C (100.4F), Mild Pain (1 - 3)  aluminum hydroxide/magnesium hydroxide/simethicone Suspension 30 milliLiter(s) Oral four times a day PRN Indigestion  aluminum hydroxide/magnesium hydroxide/simethicone Suspension 30 milliLiter(s) Oral every 4 hours PRN Dyspepsia  magnesium hydroxide Suspension 30 milliLiter(s) Oral daily PRN Constipation  melatonin 3 milliGRAM(s) Oral at bedtime PRN Insomnia  morphine  - Injectable 2 milliGRAM(s) IV Push every 4 hours PRN Severe Pain (7 - 10)  morphine  - Injectable 1 milliGRAM(s) IV Push every 4 hours PRN Moderate Pain (4 - 6)  ondansetron Injectable 4 milliGRAM(s) IV Push every 6 hours PRN Nausea and/or Vomiting  ondansetron Injectable 4 milliGRAM(s) IV Push every 8 hours PRN Nausea and/or Vomiting  oxyCODONE    IR 2.5 milliGRAM(s) Oral every 4 hours PRN Moderate Pain (4 - 6)  oxyCODONE    IR 5 milliGRAM(s) Oral every 4 hours PRN Severe Pain (7 - 10)  traMADol 25 milliGRAM(s) Oral every 6 hours PRN Mild Pain (1 - 3)      RADIOLOGY/ADDITIONAL STUDIES:

## 2024-11-11 NOTE — PROGRESS NOTE ADULT - ASSESSMENT
#Right patellar fracture  -s/p R patellar ORIF (11/09/24)  -PT/OT: FU PT Recs   -WBAT to RLE in Cylinder cast  -Pain Control  -DVT ppx: A81 BID  -Complete perioperative abx x 24 hours  -Rest, ice, compress, and elevate the extremity as tolerated  -Incentive Spirometry  -Orthopedics following: f/u in office at 3wk: Dr. Javier Guillory    #Anemia, likely acute blood loss anemia   -monitor H&H   -transfuse PRN    #Elevated troponin  -no chest pain  -no acute EKG changes  -seen and evaluated by Cardiology   -cardiology following     #Episodes of Atrial Tachycardia  -Start Metoprolol Tartrate 12.5mg BID     #Dementia  -on Seroquel Donepezil, memantine, lexapro    #HTN  -on amlodipine    #VTE Prophylaxis   -ASA 81mg BID     #Advanced Care Directives   -DNR/DNI. MOLST in the chart

## 2024-11-11 NOTE — CONSULT NOTE ADULT - ASSESSMENT
Process of Care  --Reviewed dx/treatment problems and alignment with Goals of Care    Physical Aspects of Care  --Pain  patient denies at this time  polypharmacy reduction:   IV Morphine Discontinued  Tramadol Discontinued  Maximize non-opiate pain managment  cw 1000mg PO Tylenol TID  c/w oxyCODONE    IR 2.5 milliGRAM(s) Oral every 4 hours PRN Moderate Pain  c/w oxyCODONE    IR 5 milliGRAM(s) Oral every 4 hours PRN Severe Pain     --Bowel Regimen  denies constipation  risk for constipation d/t immobility  daily dulcolax    --Dyspnea  No SOB at this time  comfortable and in NAD    --Nausea Vomiting  denies    --Weakness  PT as tolerated     Psychological and Psychiatric Aspects of Care:   --Greif/Bereavment: emotional support provided  --Hx of psychiatric dx: none  -Pastoral Care Available PRN     Social Aspects of Care  -SW involved     Cultural Aspects  -Primary Language: English    Goals of Care:     We discussed Palliative Care team being a supportive team when a patient has ongoing illnesses.  We also discussed that it is not an end of life care service, but can help navigate symptoms and emotional support througout their hospital stay here.       Ethical and Legal Aspects:   NA        Capacity: pt simple capacity   HCP/Surrogate: Elena (Daughter)  Code Status: dnr dni  MOLST: DNR DNI   Dispo Plan:    Discussed With: Case coordinated with attending and SW and RN     Time Spent: 90 minutes including the care, coordination and counseling of this patient, 50% of which was spent coordinating and counseling.

## 2024-11-11 NOTE — PROGRESS NOTE ADULT - SUBJECTIVE AND OBJECTIVE BOX
HOSPITALIST ATTENDING PROGRESS NOTE     Chart and meds reviewed. Patient seen and examined       Interval Hx/Events: Pt seen and evaluated. Plan for OR today     11/10: Pt seen and evaluated. s/p R patellar ORIF    : Pt seen and evaluated. No acute complaints.   Tele: PATs     All other systems and founds to be negative with exception of what has been described above.     PHYSICAL EXAM:  Vital Signs Last 24 Hrs  T(C): 36.9 (2024 08:00), Max: 37.9 (10 Nov 2024 22:00)  T(F): 98.5 (2024 08:00), Max: 100.3 (10 Nov 2024 22:00)  HR: 81 (:) (81 - 100)  BP: 163/74 (:) (130/58 - 163/74)  RR: 18 (:) (18 - 19)  SpO2: 100% (:) (93% - 100%)    Parameters below as of 2024 08:00  Patient On (Oxygen Delivery Method): room air              Daily     Daily Weight in k.9 (2024 21:45)    GEN: NAD   HEENT: EOMI,  moist mucous membranes  NECK : Soft and supple, no JVD  LUNG: CTABL, No wheezing, rales or rhonchi  CVS: S1S2+, RRR, no M/G/R  GI: BS+, soft, NT/ND, no guarding, no rebound  EXTREMITIES: No peripheral edema, RLE in cast   VASCULAR: 2+ peripheral pulses  NEURO: AAOx1-2, grossly non-focal   SKIN: No rashes    HOME MEDICATIONS:  Home Medications:  amLODIPine 5 mg oral tablet: 1 tab(s) orally once a day (2024 15:02)  donepezil 10 mg oral tablet: 1 tab(s) orally once a day (at bedtime) (2024 15:01)  escitalopram 10 mg oral tablet: 1.5 tab(s) orally once a day (2024 14:57)  levothyroxine 25 mcg (0.025 mg) oral tablet: 1 tab(s) orally once a day (at bedtime) (2024 15:01)  memantine 10 mg oral tablet: 1 tab(s) orally once a day (2024 15:01)  omeprazole 20 mg oral delayed release capsule: 1 cap(s) orally once a day (2024 15:01)  QUEtiapine 25 mg oral tablet: 1 tab(s) orally (2024 15:03)  trospium 20 mg oral tablet: 1 tab(s) orally once a day (2024 15:01)      MEDICATIONS  MEDICATIONS  (STANDING):  acetaminophen     Tablet .. 1000 milliGRAM(s) Oral every 8 hours  amLODIPine   Tablet 5 milliGRAM(s) Oral daily  aspirin enteric coated 81 milliGRAM(s) Oral two times a day  donepezil 10 milliGRAM(s) Oral at bedtime  escitalopram 15 milliGRAM(s) Oral daily  influenza  Vaccine (HIGH DOSE) 0.5 milliLiter(s) IntraMuscular once  levothyroxine 25 MICROGram(s) Oral daily  memantine 10 milliGRAM(s) Oral daily  metoprolol tartrate 12.5 milliGRAM(s) Oral two times a day  oxybutynin 5 milliGRAM(s) Oral two times a day  pantoprazole    Tablet 40 milliGRAM(s) Oral before breakfast  polyethylene glycol 3350 17 Gram(s) Oral at bedtime  potassium phosphate / sodium phosphate Powder (PHOS-NaK) 1 Packet(s) Oral three times a day  QUEtiapine 25 milliGRAM(s) Oral daily  senna 2 Tablet(s) Oral at bedtime  sodium chloride 0.9%. 1000 milliLiter(s) (100 mL/Hr) IV Continuous <Continuous>  tranexamic acid IVPB 1000 milliGRAM(s) IV Intermittent once  tranexamic acid IVPB 1000 milliGRAM(s) IV Intermittent once    MEDICATIONS  (PRN):  acetaminophen     Tablet .. 650 milliGRAM(s) Oral every 6 hours PRN Temp greater or equal to 38C (100.4F), Mild Pain (1 - 3)  aluminum hydroxide/magnesium hydroxide/simethicone Suspension 30 milliLiter(s) Oral four times a day PRN Indigestion  aluminum hydroxide/magnesium hydroxide/simethicone Suspension 30 milliLiter(s) Oral every 4 hours PRN Dyspepsia  magnesium hydroxide Suspension 30 milliLiter(s) Oral daily PRN Constipation  melatonin 3 milliGRAM(s) Oral at bedtime PRN Insomnia  ondansetron Injectable 4 milliGRAM(s) IV Push every 8 hours PRN Nausea and/or Vomiting  ondansetron Injectable 4 milliGRAM(s) IV Push every 6 hours PRN Nausea and/or Vomiting  oxyCODONE    IR 2.5 milliGRAM(s) Oral every 4 hours PRN Moderate Pain (4 - 6)  oxyCODONE    IR 5 milliGRAM(s) Oral every 4 hours PRN Severe Pain (7 - 10)        LABS: All Labs Reviewed:                        9.6    7.34  )-----------( 233      ( 2024 07:00 )             29.2       142  |  108  |  8   ----------------------------<  98  3.3[L]   |  31  |  0.68    Ca    7.8[L]      2024 07:00  Phos  2.1       Mg     2.0                   Blood Culture:     I&O's Detail    10 Nov 2024 07:01  -  2024 07:00  --------------------------------------------------------  IN:    Oral Fluid: 550 mL    sodium chloride 0.9%: 1000 mL  Total IN: 1550 mL    OUT:    Intermittent Catheterization - Urethral (mL): 900 mL    Voided (mL): 500 mL  Total OUT: 1400 mL    Total NET: 150 mL      2024 07:01  -  2024 14:08  --------------------------------------------------------  IN:    Oral Fluid: 240 mL  Total IN: 240 mL    OUT:  Total OUT: 0 mL    Total NET: 240 mL              CAPILLARY BLOOD GLUCOSE    CARDIOLOGY TESTING   EKG: reviewed     RADIOLOGY: reviewed

## 2024-11-11 NOTE — CONSULT NOTE ADULT - CONSULT REASON
right patella fracture
complex goals of care and symptom management
Asked to see patient for elevated troponin

## 2024-11-12 LAB
ANION GAP SERPL CALC-SCNC: 2 MMOL/L — LOW (ref 5–17)
BUN SERPL-MCNC: 10 MG/DL — SIGNIFICANT CHANGE UP (ref 7–23)
CALCIUM SERPL-MCNC: 7.9 MG/DL — LOW (ref 8.5–10.1)
CHLORIDE SERPL-SCNC: 109 MMOL/L — HIGH (ref 96–108)
CO2 SERPL-SCNC: 31 MMOL/L — SIGNIFICANT CHANGE UP (ref 22–31)
CREAT SERPL-MCNC: 0.7 MG/DL — SIGNIFICANT CHANGE UP (ref 0.5–1.3)
EGFR: 84 ML/MIN/1.73M2 — SIGNIFICANT CHANGE UP
GLUCOSE SERPL-MCNC: 98 MG/DL — SIGNIFICANT CHANGE UP (ref 70–99)
HCT VFR BLD CALC: 28.9 % — LOW (ref 34.5–45)
HGB BLD-MCNC: 9.4 G/DL — LOW (ref 11.5–15.5)
MAGNESIUM SERPL-MCNC: 2.1 MG/DL — SIGNIFICANT CHANGE UP (ref 1.6–2.6)
MCHC RBC-ENTMCNC: 29.2 PG — SIGNIFICANT CHANGE UP (ref 27–34)
MCHC RBC-ENTMCNC: 32.5 G/DL — SIGNIFICANT CHANGE UP (ref 32–36)
MCV RBC AUTO: 89.8 FL — SIGNIFICANT CHANGE UP (ref 80–100)
PHOSPHATE SERPL-MCNC: 2.4 MG/DL — LOW (ref 2.5–4.5)
PLATELET # BLD AUTO: 260 K/UL — SIGNIFICANT CHANGE UP (ref 150–400)
POTASSIUM SERPL-MCNC: 3.8 MMOL/L — SIGNIFICANT CHANGE UP (ref 3.5–5.3)
POTASSIUM SERPL-SCNC: 3.8 MMOL/L — SIGNIFICANT CHANGE UP (ref 3.5–5.3)
RBC # BLD: 3.22 M/UL — LOW (ref 3.8–5.2)
RBC # FLD: 13.9 % — SIGNIFICANT CHANGE UP (ref 10.3–14.5)
SODIUM SERPL-SCNC: 142 MMOL/L — SIGNIFICANT CHANGE UP (ref 135–145)
WBC # BLD: 6.08 K/UL — SIGNIFICANT CHANGE UP (ref 3.8–10.5)
WBC # FLD AUTO: 6.08 K/UL — SIGNIFICANT CHANGE UP (ref 3.8–10.5)

## 2024-11-12 PROCEDURE — 99232 SBSQ HOSP IP/OBS MODERATE 35: CPT

## 2024-11-12 RX ADMIN — OXYBUTYNIN CHLORIDE 5 MILLIGRAM(S): 5 TABLET ORAL at 05:13

## 2024-11-12 RX ADMIN — DIBASIC SODIUM PHOSPHATE, MONOBASIC POTASSIUM PHOSPHATE AND MONOBASIC SODIUM PHOSPHATE 1 PACKET(S): 852; 155; 130 TABLET ORAL at 21:37

## 2024-11-12 RX ADMIN — Medication 1000 MILLIGRAM(S): at 05:12

## 2024-11-12 RX ADMIN — DONEPEZIL HYDROCHLORIDE 10 MILLIGRAM(S): 23 TABLET ORAL at 21:37

## 2024-11-12 RX ADMIN — Medication 1000 MILLIGRAM(S): at 14:49

## 2024-11-12 RX ADMIN — Medication 1000 MILLIGRAM(S): at 15:21

## 2024-11-12 RX ADMIN — Medication 5 MILLIGRAM(S): at 10:46

## 2024-11-12 RX ADMIN — Medication 1000 MILLIGRAM(S): at 21:36

## 2024-11-12 RX ADMIN — Medication 1000 MILLIGRAM(S): at 05:13

## 2024-11-12 RX ADMIN — MEMANTINE HYDROCHLORIDE 10 MILLIGRAM(S): 21 CAPSULE, EXTENDED RELEASE ORAL at 10:47

## 2024-11-12 RX ADMIN — QUETIAPINE FUMARATE 25 MILLIGRAM(S): 200 TABLET ORAL at 10:46

## 2024-11-12 RX ADMIN — DIBASIC SODIUM PHOSPHATE, MONOBASIC POTASSIUM PHOSPHATE AND MONOBASIC SODIUM PHOSPHATE 1 PACKET(S): 852; 155; 130 TABLET ORAL at 05:14

## 2024-11-12 RX ADMIN — ESCITALOPRAM 15 MILLIGRAM(S): 10 TABLET, FILM COATED ORAL at 10:47

## 2024-11-12 RX ADMIN — Medication 2 TABLET(S): at 21:36

## 2024-11-12 RX ADMIN — Medication 81 MILLIGRAM(S): at 05:12

## 2024-11-12 RX ADMIN — Medication 25 MICROGRAM(S): at 05:13

## 2024-11-12 RX ADMIN — POLYETHYLENE GLYCOL 3350 17 GRAM(S): 17 POWDER, FOR SOLUTION ORAL at 21:37

## 2024-11-12 RX ADMIN — Medication 81 MILLIGRAM(S): at 18:30

## 2024-11-12 RX ADMIN — DIBASIC SODIUM PHOSPHATE, MONOBASIC POTASSIUM PHOSPHATE AND MONOBASIC SODIUM PHOSPHATE 1 PACKET(S): 852; 155; 130 TABLET ORAL at 14:49

## 2024-11-12 RX ADMIN — OXYBUTYNIN CHLORIDE 5 MILLIGRAM(S): 5 TABLET ORAL at 18:30

## 2024-11-12 RX ADMIN — Medication 12.5 MILLIGRAM(S): at 21:37

## 2024-11-12 RX ADMIN — PANTOPRAZOLE SODIUM 40 MILLIGRAM(S): 40 TABLET, DELAYED RELEASE ORAL at 05:12

## 2024-11-12 RX ADMIN — Medication 12.5 MILLIGRAM(S): at 10:49

## 2024-11-12 NOTE — PROGRESS NOTE ADULT - SUBJECTIVE AND OBJECTIVE BOX
Patient seen and examined at bedside. Pain well controlled with medication. Patient denies any numbness, tingling, weakness, or any other orthopaedic complaint. Denies N/V/CP/SOB.    LABS:                        9.6    7.34  )-----------( 233      ( 11 Nov 2024 07:00 )             29.2     11-11    142  |  108  |  8   ----------------------------<  98  3.3[L]   |  31  |  0.68    Ca    7.8[L]      11 Nov 2024 07:00  Phos  2.1     11-11  Mg     2.0     11-11        Urinalysis Basic - ( 11 Nov 2024 07:00 )    Color: x / Appearance: x / SG: x / pH: x  Gluc: 98 mg/dL / Ketone: x  / Bili: x / Urobili: x   Blood: x / Protein: x / Nitrite: x   Leuk Esterase: x / RBC: x / WBC x   Sq Epi: x / Non Sq Epi: x / Bacteria: x        VITAL SIGNS:  T(C): 36.8 (11-11-24 @ 23:50), Max: 36.9 (11-11-24 @ 08:00)  HR: 79 (11-11-24 @ 23:50) (79 - 82)  BP: 129/66 (11-11-24 @ 23:50) (129/66 - 163/74)  RR: 18 (11-11-24 @ 23:50) (18 - 18)  SpO2: 97% (11-11-24 @ 23:50) (97% - 100%)    General: NAD, resting comfortably in bed  RLE:   Cylinder cast in place, C/D/I  SCD in place bilaterally  Unable to assess calf tenderness  Motor: + EHL/FHL/TA/GSC  Sensory: SILT SPN/DPN/Kendell/Saph/Tib  DP/PT 2+      A/P:  89y f POD 3 s/p R patellar ORIF    -PT/OT: PT Rec [facility]   -WBAT to RLE in Cylinder cast  -Pain Control   -DVT ppx: cont A81 BID  -FU AM Labs   -Rest, ice, compress, and elevate the extremity as tolerated  -Incentive Spirometry  -Medical management appreciated  -Dispo per PT  -D/w Dr. Guillory, will update plan as needed

## 2024-11-12 NOTE — OCCUPATIONAL THERAPY INITIAL EVALUATION ADULT - LIVES WITH, PROFILE
Pt is questionable historian, per EMR pt lives with dtr in private high ranch style home, [address] and an 8/8 stair/children

## 2024-11-12 NOTE — OCCUPATIONAL THERAPY INITIAL EVALUATION ADULT - ADL RETRAINING, OT EVAL
Patient will participate in lower body dressing with SUP   in 2 weeks  Patient will participate in toilet transfer with  SUP  in 2 weeks  Patient will participate in toileting with    SUP  in 2 weeks  Patient will participate in grooming standing at the sink with SUP   in 2 weeks

## 2024-11-12 NOTE — OCCUPATIONAL THERAPY INITIAL EVALUATION ADULT - GENERAL OBSERVATIONS, REHAB EVAL
Pt received received semi-supine in bed, A&Ox1, Tristanian translation #[id number] used throughout. Pt left as received, comfortably positioned in bed with lunch set up

## 2024-11-12 NOTE — PROGRESS NOTE ADULT - ASSESSMENT
88 yo woman with dementia, HTN, hypothyroidism, GERD, osteoporosis, presented for further evaluation and management after a trip and fall, which resulted in R knee pain and swelling, difficulty with ambulating. Patient was found to have an acute comminuted displaced right patellar fracture. Admitted to Medicine with Orthopedic Surgery following.     Fall, traumatic R patellar fracture  S/P ORIF 11/9. Tolerated procedure well. Orthopedic Surgery input appreciated. WBAT to RLE in cylinder cast. Pain control as needed. DVT ppx w/ ASA 81mg BID. PT/OT recommending [facility]. Continue restorative therapy sessions. Outpatient follow-up with Dr Guillory.     HTN  BP controlled. Continue amlodipine and metoprolol    Hypothyroidism  Stable. Continue levothyroxine    GERD  Stable. Continue pantoprazole    Dementia with depression  Stable. Continue to reorient frequently. Continue donepezil, memantine and escitalopram.    Mild hypophosphatemia  Replete phos to goal ~3        Dispo: Stable for DC to [facility] pending auth

## 2024-11-12 NOTE — OCCUPATIONAL THERAPY INITIAL EVALUATION ADULT - PERTINENT HX OF CURRENT PROBLEM, REHAB EVAL
89yo female POD 1 s/p R patellar ORIF.    XRAYS: Comminuted fracture of the right patella with moderate separation of the fracture fragments. The femur tibia and fibula appear intact.  CT KNEE: Acute comminuted displaced right patellar fracture including dominant oblique transverse component through the midpole with small more significantly displaced osteochondral fragment along its deep central margin.

## 2024-11-12 NOTE — PROGRESS NOTE ADULT - SUBJECTIVE AND OBJECTIVE BOX
Chief Complaint: Trip and fall, R knee pain and swelling, difficulty with ambulating    Interval Hx: Patient seen and examined. History limited due to patient's dementia. No acute pain complaints. Resting comfortably. Still with some difficulty with ambulation. Seen by PT. Recommended LEOPOLDO. Orthopedic Surgery advises WBAT to RLE in cylinder cast. Pain control as needed. DVT px with ASA 81mg BID. Stable for discharge. Pending auth.    ROS: Multi system review is comprehensively negative x 10 systems except as above    Vitals:  T(F): 98.7 (12 Nov 2024 16:05), Max: 98.7 (12 Nov 2024 16:05)  HR: 79 (12 Nov 2024 16:05) (71 - 82)  BP: 120/64 (12 Nov 2024 16:05) (120/64 - 155/71)  RR: 18 (12 Nov 2024 16:05) (18 - 18)  SpO2: 99% (12 Nov 2024 16:05) (97% - 100%) on room air    Exam:  Gen: Comfortable  HEENT: NCAT PERRL EOMI MMM  Neck: Supple, No JVD, No LAD  CVS: s1 s2 normal, rate ~80  Chest: Normal resp effort, lungs CTA B/L  Abd: Non distended, +BS, soft, non tender  Ext: RLE in cylinder cast  Skin: Warm, dry  Mood: Calm, pleasant  Neuro: Drowsy, arousable to voice, follows simple commands    Labs:                      9.4    6.08  )--------( 260                  28.9       142  |  109  |  10  -----------------------<  98  3.8   |   31   |  0.70    Ca  7.9    Phos  2.4    Mg   2.1        Troponin 109 --> 116 --> 108    Imaging:  XR R knee 3 views 11/8: Comminuted transverse fracture through the midportion ofthe patella. Bony fragments are  by up to 2 cm. There is a small joint effusion.    XR R Tibia and fibula 2 views 11/8: The leg is in a splint partially obscures the adjacent bony structures. No evidence of fracture in the tibia or fibula.    XR R femur 2 views 11/8: The femur appears intact. No evidence of focal bony destruction or periosteal reaction.    CT R knee WO 11/8: Acute comminuted displaced right patellar fracture including dominant oblique transverse component through the midpole with small more significantly displaced osteochondral fragment along its deep central margin.    CT C spine WO 11/8: Vertebral body heights are intact. Grade 1 anterolisthesis C4 on C5 unchanged. Multilevel degenerative changes noted resulting in multilevel spinal canal stenosis and neural foraminal narrowing. No acute fracture cervical spine    CT head WO 11/8: Moderate diffuse parenchymal volume loss. There are areas of low attenuation in the periventricular white matter likely related to mild chronic microvascular ischemic changes. There is no acute intracranial hemorrhage, parenchymal mass, mass effect or midline shift. There is no acute territorial infarct. There is no hydrocephalus. The cranium is intact. Partially visualized mildly displaced nasal bone fracture noted. Small osteoma ethmoid sinus.    Cardiac Testing:  EKG 11/9: Rate 86.  Sinus rhythm with 1st degree A-V block. Nonspecific T wave abnormality.    EKG 11/8: Rate 83. Normal sinus rhythm. Nonspecific ST and T wave abnormality    Meds:  MEDICATIONS  (STANDING):  acetaminophen     Tablet .. 1000 milliGRAM(s) Oral every 8 hours  amLODIPine   Tablet 5 milliGRAM(s) Oral daily  aspirin enteric coated 81 milliGRAM(s) Oral two times a day  donepezil 10 milliGRAM(s) Oral at bedtime  escitalopram 15 milliGRAM(s) Oral daily  influenza  Vaccine (HIGH DOSE) 0.5 milliLiter(s) IntraMuscular once  lactated ringers. 1000 milliLiter(s) (50 mL/Hr) IV Continuous <Continuous>  levothyroxine 25 MICROGram(s) Oral daily  memantine 10 milliGRAM(s) Oral daily  metoprolol tartrate 12.5 milliGRAM(s) Oral two times a day  oxybutynin 5 milliGRAM(s) Oral two times a day  pantoprazole    Tablet 40 milliGRAM(s) Oral before breakfast  polyethylene glycol 3350 17 Gram(s) Oral at bedtime  potassium phosphate / sodium phosphate Powder (PHOS-NaK) 1 Packet(s) Oral three times a day  QUEtiapine 25 milliGRAM(s) Oral daily  senna 2 Tablet(s) Oral at bedtime      MEDICATIONS  (PRN):  acetaminophen     Tablet .. 650 milliGRAM(s) Oral every 6 hours PRN Temp greater or equal to 38C (100.4F), Mild Pain (1 - 3)  aluminum hydroxide/magnesium hydroxide/simethicone Suspension 30 milliLiter(s) Oral four times a day PRN Indigestion  aluminum hydroxide/magnesium hydroxide/simethicone Suspension 30 milliLiter(s) Oral every 4 hours PRN Dyspepsia  magnesium hydroxide Suspension 30 milliLiter(s) Oral daily PRN Constipation  melatonin 3 milliGRAM(s) Oral at bedtime PRN Insomnia  ondansetron Injectable 4 milliGRAM(s) IV Push every 6 hours PRN Nausea and/or Vomiting  oxyCODONE    IR 2.5 milliGRAM(s) Oral every 4 hours PRN Moderate Pain (4 - 6)  oxyCODONE    IR 5 milliGRAM(s) Oral every 4 hours PRN Severe Pain (7 - 10)

## 2024-11-13 ENCOUNTER — TRANSCRIPTION ENCOUNTER (OUTPATIENT)
Age: 89
End: 2024-11-13

## 2024-11-13 VITALS
DIASTOLIC BLOOD PRESSURE: 77 MMHG | SYSTOLIC BLOOD PRESSURE: 130 MMHG | HEART RATE: 75 BPM | RESPIRATION RATE: 18 BRPM | OXYGEN SATURATION: 100 %

## 2024-11-13 LAB
ANION GAP SERPL CALC-SCNC: 5 MMOL/L — SIGNIFICANT CHANGE UP (ref 5–17)
BUN SERPL-MCNC: 14 MG/DL — SIGNIFICANT CHANGE UP (ref 7–23)
CALCIUM SERPL-MCNC: 8.9 MG/DL — SIGNIFICANT CHANGE UP (ref 8.5–10.1)
CHLORIDE SERPL-SCNC: 107 MMOL/L — SIGNIFICANT CHANGE UP (ref 96–108)
CO2 SERPL-SCNC: 28 MMOL/L — SIGNIFICANT CHANGE UP (ref 22–31)
CREAT SERPL-MCNC: 0.72 MG/DL — SIGNIFICANT CHANGE UP (ref 0.5–1.3)
EGFR: 81 ML/MIN/1.73M2 — SIGNIFICANT CHANGE UP
GLUCOSE SERPL-MCNC: 92 MG/DL — SIGNIFICANT CHANGE UP (ref 70–99)
HCT VFR BLD CALC: 29 % — LOW (ref 34.5–45)
HGB BLD-MCNC: 9.5 G/DL — LOW (ref 11.5–15.5)
MCHC RBC-ENTMCNC: 29.3 PG — SIGNIFICANT CHANGE UP (ref 27–34)
MCHC RBC-ENTMCNC: 32.8 G/DL — SIGNIFICANT CHANGE UP (ref 32–36)
MCV RBC AUTO: 89.5 FL — SIGNIFICANT CHANGE UP (ref 80–100)
PLATELET # BLD AUTO: 308 K/UL — SIGNIFICANT CHANGE UP (ref 150–400)
POTASSIUM SERPL-MCNC: 3.6 MMOL/L — SIGNIFICANT CHANGE UP (ref 3.5–5.3)
POTASSIUM SERPL-SCNC: 3.6 MMOL/L — SIGNIFICANT CHANGE UP (ref 3.5–5.3)
RBC # BLD: 3.24 M/UL — LOW (ref 3.8–5.2)
RBC # FLD: 13.7 % — SIGNIFICANT CHANGE UP (ref 10.3–14.5)
SODIUM SERPL-SCNC: 140 MMOL/L — SIGNIFICANT CHANGE UP (ref 135–145)
WBC # BLD: 6.52 K/UL — SIGNIFICANT CHANGE UP (ref 3.8–10.5)
WBC # FLD AUTO: 6.52 K/UL — SIGNIFICANT CHANGE UP (ref 3.8–10.5)

## 2024-11-13 PROCEDURE — 99239 HOSP IP/OBS DSCHRG MGMT >30: CPT

## 2024-11-13 RX ORDER — ASPIRIN/MAG CARB/ALUMINUM AMIN 325 MG
1 TABLET ORAL
Qty: 0 | Refills: 0 | DISCHARGE
Start: 2024-11-13

## 2024-11-13 RX ORDER — OXYCODONE HYDROCHLORIDE 30 MG/1
0.5 TABLET ORAL
Qty: 0 | Refills: 0 | DISCHARGE
Start: 2024-11-13

## 2024-11-13 RX ORDER — METOPROLOL TARTRATE 50 MG
0.5 TABLET ORAL
Qty: 30 | Refills: 0
Start: 2024-11-13

## 2024-11-13 RX ORDER — QUETIAPINE FUMARATE 200 MG/1
1 TABLET ORAL
Qty: 0 | Refills: 0 | DISCHARGE

## 2024-11-13 RX ORDER — POLYETHYLENE GLYCOL 3350 17 G/17G
17 POWDER, FOR SOLUTION ORAL
Qty: 0 | Refills: 0 | DISCHARGE
Start: 2024-11-13

## 2024-11-13 RX ORDER — ACETAMINOPHEN 500 MG
2 TABLET ORAL
Qty: 0 | Refills: 0 | DISCHARGE
Start: 2024-11-13

## 2024-11-13 RX ORDER — SENNA 187 MG
2 TABLET ORAL
Qty: 0 | Refills: 0 | DISCHARGE
Start: 2024-11-13

## 2024-11-13 RX ADMIN — PANTOPRAZOLE SODIUM 40 MILLIGRAM(S): 40 TABLET, DELAYED RELEASE ORAL at 06:23

## 2024-11-13 RX ADMIN — Medication 81 MILLIGRAM(S): at 06:22

## 2024-11-13 RX ADMIN — Medication 25 MICROGRAM(S): at 06:23

## 2024-11-13 RX ADMIN — OXYBUTYNIN CHLORIDE 5 MILLIGRAM(S): 5 TABLET ORAL at 06:22

## 2024-11-13 RX ADMIN — Medication 12.5 MILLIGRAM(S): at 09:28

## 2024-11-13 RX ADMIN — QUETIAPINE FUMARATE 25 MILLIGRAM(S): 200 TABLET ORAL at 09:28

## 2024-11-13 RX ADMIN — Medication 5 MILLIGRAM(S): at 09:30

## 2024-11-13 RX ADMIN — MEMANTINE HYDROCHLORIDE 10 MILLIGRAM(S): 21 CAPSULE, EXTENDED RELEASE ORAL at 09:30

## 2024-11-13 RX ADMIN — ESCITALOPRAM 15 MILLIGRAM(S): 10 TABLET, FILM COATED ORAL at 09:28

## 2024-11-13 RX ADMIN — Medication 1000 MILLIGRAM(S): at 06:23

## 2024-11-13 RX ADMIN — DIBASIC SODIUM PHOSPHATE, MONOBASIC POTASSIUM PHOSPHATE AND MONOBASIC SODIUM PHOSPHATE 1 PACKET(S): 852; 155; 130 TABLET ORAL at 06:24

## 2024-11-13 NOTE — DISCHARGE NOTE NURSING/CASE MANAGEMENT/SOCIAL WORK - FINANCIAL ASSISTANCE
[facility] provides services at a reduced cost to those who are determined to be eligible through [facility]’s financial assistance program. Information regarding [facility]’s financial assistance program can be found by going to [email or web address] or by calling [phone].

## 2024-11-13 NOTE — DISCHARGE NOTE NURSING/CASE MANAGEMENT/SOCIAL WORK - NSDCPEFALRISK_GEN_ALL_CORE
For information on Fall & Injury Prevention, visit: [email or web address] OR  [email or web address] OR  https://www.cdc.gov/steadi/patient.html

## 2024-11-13 NOTE — PROGRESS NOTE ADULT - REASON FOR ADMISSION
Right Patellar fracture, Elevated troponin
Right patellar fracture  Elevated troponin
Right Patellar fracture, Elevated troponin

## 2024-11-13 NOTE — DISCHARGE NOTE NURSING/CASE MANAGEMENT/SOCIAL WORK - PATIENT PORTAL LINK FT
You can access the FollowMyHealth Patient Portal offered by [facility] by registering at the following website: [email or web address]. By joining [employer]’s FollowMyHealth portal, you will also be able to view your health information using other applications (apps) compatible with our system.

## 2024-11-13 NOTE — PROGRESS NOTE ADULT - PROVIDER SPECIALTY LIST ADULT
Hospitalist
Orthopedics
Hospitalist
Orthopedics
Hospitalist
Hospitalist
Cardiology

## 2024-11-13 NOTE — DISCHARGE NOTE NURSING/CASE MANAGEMENT/SOCIAL WORK - NSDCVIVACCINE_GEN_ALL_CORE_FT
Tdap; 08-Nov-2024 10:57; Nadya Stephens (EBENEZER); [employer]; [id number] (Exp. Date: 01-May-2026); IntraMuscular; Deltoid Left.; 0.5 milliLiter(s); VIS (VIS Published: 09-May-2013, VIS Presented: 08-Nov-2024);

## 2024-11-13 NOTE — PROGRESS NOTE ADULT - SUBJECTIVE AND OBJECTIVE BOX
Patient seen and examined at bedside. Pain well controlled with medication. Patient denies any numbness, tingling, weakness, or any other orthopaedic complaint. Denies N/V/CP/SOB.    LABS:                        9.4    6.08  )-----------( 260      ( 12 Nov 2024 06:50 )             28.9     11-12    142  |  109[H]  |  10  ----------------------------<  98  3.8   |  31  |  0.70    Ca    7.9[L]      12 Nov 2024 06:50  Phos  2.4     11-12  Mg     2.1     11-12      Urinalysis Basic - ( 12 Nov 2024 06:50 )  Color: x / Appearance: x / SG: x / pH: x  Gluc: 98 mg/dL / Ketone: x  / Bili: x / Urobili: x   Blood: x / Protein: x / Nitrite: x   Leuk Esterase: x / RBC: x / WBC x   Sq Epi: x / Non Sq Epi: x / Bacteria: x      VITAL SIGNS:  T(C): 37.1 (11-12-24 @ 16:05), Max: 37.1 (11-12-24 @ 16:05)  HR: 79 (11-12-24 @ 16:05) (71 - 79)  BP: 120/64 (11-12-24 @ 16:05) (120/64 - 130/71)  RR: 18 (11-12-24 @ 16:05) (18 - 18)  SpO2: 99% (11-12-24 @ 16:05) (99% - 100%)    General: NAD, resting comfortably in bed  RLE:   Cylinder cast in place, C/D/I  SCD in place bilaterally  Unable to assess calf tenderness  Motor: + EHL/FHL/TA/GSC  Sensory: SILT SPN/DPN/Kendell/Saph/Tib  DP/PT 2+      A/P:  89y f POD 4 s/p R patellar ORIF    -PT/OT: PT Rec LEOPOLDO   -WBAT to RLE in Cylinder cast  -Pain Control   -DVT ppx: cont A81 BID  -FU AM Labs   -Rest, ice, compress, and elevate the extremity as tolerated  -Incentive Spirometry  -Medical management appreciated  -Dispo per PT  -D/w Dr. Guillory, will update plan as needed   Patient seen and examined at bedside. Pain well controlled with medication. Patient denies any numbness, tingling, weakness, or any other orthopaedic complaint. Denies N/V/CP/SOB.    LABS:                        9.4    6.08  )-----------( 260      ( 12 Nov 2024 06:50 )             28.9     11-12    142  |  109[H]  |  10  ----------------------------<  98  3.8   |  31  |  0.70    Ca    7.9[L]      12 Nov 2024 06:50  Phos  2.4     11-12  Mg     2.1     11-12      Urinalysis Basic - ( 12 Nov 2024 06:50 )  Color: x / Appearance: x / SG: x / pH: x  Gluc: 98 mg/dL / Ketone: x  / Bili: x / Urobili: x   Blood: x / Protein: x / Nitrite: x   Leuk Esterase: x / RBC: x / WBC x   Sq Epi: x / Non Sq Epi: x / Bacteria: x      VITAL SIGNS:  T(C): 37.1 (11-12-24 @ 16:05), Max: 37.1 (11-12-24 @ 16:05)  HR: 79 (11-12-24 @ 16:05) (71 - 79)  BP: 120/64 (11-12-24 @ 16:05) (120/64 - 130/71)  RR: 18 (11-12-24 @ 16:05) (18 - 18)  SpO2: 99% (11-12-24 @ 16:05) (99% - 100%)    General: NAD, resting comfortably in bed  RLE:   Cylinder cast in place, C/D/I  SCD in place bilaterally  Unable to assess calf tenderness  Motor: + EHL/FHL/TA/GSC  Sensory: SILT SPN/DPN/Kendell/Saph/Tib  DP/PT 2+      A/P:  89y f POD 4 s/p R patellar ORIF    -PT/OT: PT Rec LEOPOLDO   -WBAT to RLE in Cylinder cast  -Pain Control   -DVT ppx: cont A81 BID  -FU AM Labs   -Rest, ice, compress, and elevate the extremity as tolerated  -Incentive Spirometry  -Medical management appreciated  -Dispo per PT    No further orthopaedic surgical intervention indicated at this time. This patient is orthopaedically stable for discharge.   Patient to follow up with Dr. Guillory as an outpatient for further evaluation and management.   All of the patient's questions and concerns were answered and addressed.    -D/w Dr. Guillory, will update plan as needed

## 2024-11-21 DIAGNOSIS — K21.9 GASTRO-ESOPHAGEAL REFLUX DISEASE WITHOUT ESOPHAGITIS: ICD-10-CM

## 2024-11-21 DIAGNOSIS — S83.8X1A SPRAIN OF OTHER SPECIFIED PARTS OF RIGHT KNEE, INITIAL ENCOUNTER: ICD-10-CM

## 2024-11-21 DIAGNOSIS — M81.0 AGE-RELATED OSTEOPOROSIS WITHOUT CURRENT PATHOLOGICAL FRACTURE: ICD-10-CM

## 2024-11-21 DIAGNOSIS — Z79.899 OTHER LONG TERM (CURRENT) DRUG THERAPY: ICD-10-CM

## 2024-11-21 DIAGNOSIS — Z79.890 HORMONE REPLACEMENT THERAPY: ICD-10-CM

## 2024-11-21 DIAGNOSIS — E78.5 HYPERLIPIDEMIA, UNSPECIFIED: ICD-10-CM

## 2024-11-21 DIAGNOSIS — I47.19 OTHER SUPRAVENTRICULAR TACHYCARDIA: ICD-10-CM

## 2024-11-21 DIAGNOSIS — S82.041A DISPLACED COMMINUTED FRACTURE OF RIGHT PATELLA, INITIAL ENCOUNTER FOR CLOSED FRACTURE: ICD-10-CM

## 2024-11-21 DIAGNOSIS — E83.39 OTHER DISORDERS OF PHOSPHORUS METABOLISM: ICD-10-CM

## 2024-11-21 DIAGNOSIS — I44.0 ATRIOVENTRICULAR BLOCK, FIRST DEGREE: ICD-10-CM
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
## 2024-11-21 DIAGNOSIS — E03.9 HYPOTHYROIDISM, UNSPECIFIED: ICD-10-CM

## 2024-11-21 DIAGNOSIS — Z79.82 LONG TERM (CURRENT) USE OF ASPIRIN: ICD-10-CM

## 2024-11-21 DIAGNOSIS — D62 ACUTE POSTHEMORRHAGIC ANEMIA: ICD-10-CM
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
## 2024-11-21 DIAGNOSIS — F03.93 UNSPECIFIED DEMENTIA, UNSPECIFIED SEVERITY, WITH MOOD DISTURBANCE: ICD-10-CM

## 2024-11-21 DIAGNOSIS — I10 ESSENTIAL (PRIMARY) HYPERTENSION: ICD-10-CM
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
## 2024-11-21 DIAGNOSIS — R79.89 OTHER SPECIFIED ABNORMAL FINDINGS OF BLOOD CHEMISTRY: ICD-10-CM

## 2024-12-17 ENCOUNTER — APPOINTMENT (OUTPATIENT)
Dept: GERIATRICS | Facility: CLINIC | Age: 89
End: 2024-12-17

## 2025-01-07 ENCOUNTER — APPOINTMENT (OUTPATIENT)
Dept: GERIATRICS | Facility: CLINIC | Age: 89
End: 2025-01-07
Payer: MEDICARE

## 2025-01-07 VITALS
WEIGHT: 134 LBS | BODY MASS INDEX: 27.01 KG/M2 | OXYGEN SATURATION: 99 % | TEMPERATURE: 97.2 F | DIASTOLIC BLOOD PRESSURE: 81 MMHG | HEART RATE: 68 BPM | RESPIRATION RATE: 16 BRPM | HEIGHT: 59 IN | SYSTOLIC BLOOD PRESSURE: 167 MMHG

## 2025-01-07 DIAGNOSIS — G30.9 ALZHEIMER'S DISEASE, UNSPECIFIED: ICD-10-CM

## 2025-01-07 DIAGNOSIS — F02.811 ALZHEIMER'S DISEASE, UNSPECIFIED: ICD-10-CM

## 2025-01-07 DIAGNOSIS — G47.00 INSOMNIA, UNSPECIFIED: ICD-10-CM

## 2025-01-07 DIAGNOSIS — I10 ESSENTIAL (PRIMARY) HYPERTENSION: ICD-10-CM

## 2025-01-07 DIAGNOSIS — E78.9 DISORDER OF LIPOPROTEIN METABOLISM, UNSPECIFIED: ICD-10-CM

## 2025-01-07 DIAGNOSIS — S82.009A UNSPECIFIED FRACTURE OF UNSPECIFIED PATELLA, INITIAL ENCOUNTER FOR CLOSED FRACTURE: ICD-10-CM

## 2025-01-07 PROCEDURE — 99496 TRANSJ CARE MGMT HIGH F2F 7D: CPT

## 2025-01-07 RX ORDER — ACETAMINOPHEN 500 MG/1
500 TABLET, COATED ORAL EVERY 8 HOURS
Qty: 360 | Refills: 1 | Status: ACTIVE | COMMUNITY
Start: 2025-01-07

## 2025-01-07 RX ORDER — GLUCOSAMINE HCL/CHONDROITIN SU 500-400 MG
3 CAPSULE ORAL
Refills: 0 | Status: ACTIVE | COMMUNITY
Start: 2025-01-07

## 2025-01-07 RX ORDER — SENNOSIDES 8.6 MG TABLETS 8.6 MG/1
8.6 TABLET ORAL
Qty: 60 | Refills: 5 | Status: ACTIVE | COMMUNITY
Start: 2025-01-07

## 2025-01-07 RX ORDER — KRILL/OM-3/DHA/EPA/PHOSPHO/AST 1000-230MG
81 CAPSULE ORAL
Qty: 90 | Refills: 1 | Status: ACTIVE | COMMUNITY
Start: 2025-01-07

## 2025-01-07 RX ORDER — ERGOCALCIFEROL 1.25 MG/1
1.25 MG CAPSULE, LIQUID FILLED ORAL
Qty: 8 | Refills: 2 | Status: ACTIVE | COMMUNITY
Start: 2025-01-07

## 2025-01-07 RX ORDER — METOPROLOL TARTRATE 25 MG/1
25 TABLET ORAL
Qty: 90 | Refills: 0 | Status: ACTIVE | COMMUNITY
Start: 2025-01-07 | End: 1900-01-01

## 2025-01-10 ENCOUNTER — NON-APPOINTMENT (OUTPATIENT)
Age: 89
End: 2025-01-10

## 2025-01-10 LAB
25(OH)D3 SERPL-MCNC: 95.7 NG/ML
ALBUMIN SERPL ELPH-MCNC: 4.3 G/DL
ALP BLD-CCNC: 103 U/L
ALT SERPL-CCNC: 9 U/L
ANION GAP SERPL CALC-SCNC: 13 MMOL/L
AST SERPL-CCNC: 18 U/L
BILIRUB SERPL-MCNC: <0.2 MG/DL
BUN SERPL-MCNC: 17 MG/DL
CALCIUM SERPL-MCNC: 9.6 MG/DL
CHLORIDE SERPL-SCNC: 100 MMOL/L
CHOLEST SERPL-MCNC: 209 MG/DL
CO2 SERPL-SCNC: 27 MMOL/L
CREAT SERPL-MCNC: 0.86 MG/DL
EGFR: 65 ML/MIN/1.73M2
FOLATE SERPL-MCNC: 9.2 NG/ML
GLUCOSE SERPL-MCNC: 86 MG/DL
HCT VFR BLD CALC: 38.8 %
HDLC SERPL-MCNC: 52 MG/DL
HGB BLD-MCNC: 11.9 G/DL
LDLC SERPL CALC-MCNC: 122 MG/DL
MCHC RBC-ENTMCNC: 28.9 PG
MCHC RBC-ENTMCNC: 30.7 G/DL
MCV RBC AUTO: 94.2 FL
NONHDLC SERPL-MCNC: 158 MG/DL
PLATELET # BLD AUTO: 383 K/UL
POTASSIUM SERPL-SCNC: 4 MMOL/L
PROT SERPL-MCNC: 8.1 G/DL
RBC # BLD: 4.12 M/UL
RBC # FLD: 14.5 %
SODIUM SERPL-SCNC: 140 MMOL/L
TRIGL SERPL-MCNC: 206 MG/DL
TSH SERPL-ACNC: 3.22 UIU/ML
VIT B12 SERPL-MCNC: 742 PG/ML
WBC # FLD AUTO: 6.84 K/UL

## 2025-01-12 PROBLEM — E78.9 LIPID DISORDER: Status: ACTIVE | Noted: 2025-01-07

## 2025-01-12 PROBLEM — G47.00 INSOMNIA: Status: ACTIVE | Noted: 2025-01-07

## 2025-01-12 PROBLEM — S82.009A PATELLA FRACTURE: Status: ACTIVE | Noted: 2025-01-12

## 2025-01-12 NOTE — HISTORY OF PRESENT ILLNESS
[FreeTextEntry1] : 88 yo female who had a fall and broke her right patella, had surgery at [facility], went rehab, discharged yesterday. Pt in brace and going to Rehab to start flexing knee wiht support.  Pt not expressing pain.  Pt fell at as she was leaving her adult day program and fractured right patella.    Hospital bed needed Pt wearing brace on right leg now. Has a wound on right heel over achilles and right thigh area from brace rubbing. I will evaluate . Difficult to transfer pt to upright position bc of pain, brace and difficulty flexing right knee.   Pt getting PT and OT alternate days with goal of improving right knee flexibility tub transfer bench being used aide 7 days a week and 24 /7 but now while at [facility] day program  Med list reconciled with dtr (a pharamacist)  Pt has had some behavioral issues but seroquel has helped.  Pt to return to adult day program and will need for when that begins.   [Any fall with injury in past year] : Patient reported fall with injury in the past year [NO] : No [0] : 2) Feeling down, depressed, or hopeless: Not at all (0) [[id number]] : 0 [Moderate] : Stage: Moderate [Memory Lapses Or Loss] : worsened memory impairment [Patient Observed To Be Agitated] : stable agitation [Hostility Toward Caregivers] : stable aggression [Sleep Disturbances] : denies sleep disturbances [] : denies wandering [Fixed Beliefs Contradicted By Reality (Delusions)] : denies delusions [Difficulty Finding Desired Words] : denies difficulty finding desired words

## 2025-01-12 NOTE — PHYSICAL EXAM
[Alert] : alert [No Acute Distress] : in no acute distress [Normal Outer Ear/Nose] : the ears and nose were normal in appearance [Normal Appearance] : the appearance of the neck was normal [Supple] : the neck was supple [No Respiratory Distress] : no respiratory distress [No Acc Muscle Use] : no accessory muscle use [Respiration, Rhythm And Depth] : normal respiratory rhythm and effort [Heart Rate And Rhythm] : heart rate was normal and rhythm regular [Auscultation Breath Sounds / Voice Sounds] : lungs were clear to auscultation bilaterally [Bowel Sounds] : normal bowel sounds [Abdomen Tenderness] : non-tender [Abdomen Soft] : soft [No Spinal Tenderness] : no spinal tenderness [Normal Color / Pigmentation] : normal skin color and pigmentation [Normal Turgor] : normal skin turgor [No Focal Deficits] : no focal deficits [Normal Affect] : the affect was normal [Normal Mood] : the mood was normal [de-identified] : right achilles minor dry irritation, right thigh lesion dry healed

## 2025-01-12 NOTE — REVIEW OF SYSTEMS
[Arthralgias] : arthralgias [Joint Stiffness] : joint stiffness [As Noted in HPI] : as noted in HPI [Skin Lesions] : skin lesion [Negative] : Heme/Lymph

## 2025-01-12 NOTE — HISTORY OF PRESENT ILLNESS
[FreeTextEntry1] : 89 yo female who had a fall and broke her right patella, had surgery at [facility], went rehab, discharged yesterday. Pt in brace and going to Rehab to start flexing knee wiht support.  Pt not expressing pain.  Pt fell at as she was leaving her adult day program and fractured right patella.    Hospital bed needed Pt wearing brace on right leg now. Has a wound on right heel over achilles and right thigh area from brace rubbing. I will evaluate . Difficult to transfer pt to upright position bc of pain, brace and difficulty flexing right knee.   Pt getting PT and OT alternate days with goal of improving right knee flexibility tub transfer bench being used aide 7 days a week and 24 /7 but now while at [facility] day program  Med list reconciled with dtr (a pharamacist)  Pt has had some behavioral issues but seroquel has helped.  Pt to return to adult day program and will need for when that begins.   [Any fall with injury in past year] : Patient reported fall with injury in the past year [NO] : No [0] : 2) Feeling down, depressed, or hopeless: Not at all (0) [[id number]] : 0 [Moderate] : Stage: Moderate [Memory Lapses Or Loss] : worsened memory impairment [Patient Observed To Be Agitated] : stable agitation [Hostility Toward Caregivers] : stable aggression [Sleep Disturbances] : denies sleep disturbances [] : denies wandering [Fixed Beliefs Contradicted By Reality (Delusions)] : denies delusions [Difficulty Finding Desired Words] : denies difficulty finding desired words

## 2025-01-12 NOTE — PHYSICAL EXAM
[Alert] : alert [No Acute Distress] : in no acute distress [Normal Outer Ear/Nose] : the ears and nose were normal in appearance [Normal Appearance] : the appearance of the neck was normal [Supple] : the neck was supple [No Respiratory Distress] : no respiratory distress [No Acc Muscle Use] : no accessory muscle use [Respiration, Rhythm And Depth] : normal respiratory rhythm and effort [Auscultation Breath Sounds / Voice Sounds] : lungs were clear to auscultation bilaterally [Heart Rate And Rhythm] : heart rate was normal and rhythm regular [Bowel Sounds] : normal bowel sounds [Abdomen Tenderness] : non-tender [Abdomen Soft] : soft [No Spinal Tenderness] : no spinal tenderness [Normal Color / Pigmentation] : normal skin color and pigmentation [Normal Turgor] : normal skin turgor [No Focal Deficits] : no focal deficits [Normal Affect] : the affect was normal [Normal Mood] : the mood was normal [de-identified] : right achilles minor dry irritation, right thigh lesion dry healed

## 2025-01-12 NOTE — ASSESSMENT
[FreeTextEntry1] : Need hospital bed for movement, transfering, positioning of pt with right knee patella fracture and post surgery. Follow up BP

## 2025-02-11 ENCOUNTER — APPOINTMENT (OUTPATIENT)
Dept: GERIATRICS | Facility: CLINIC | Age: 89
End: 2025-02-11
Payer: MEDICARE

## 2025-02-11 DIAGNOSIS — B34.9 VIRAL INFECTION, UNSPECIFIED: ICD-10-CM

## 2025-02-11 DIAGNOSIS — F02.811 ALZHEIMER'S DISEASE, UNSPECIFIED: ICD-10-CM

## 2025-02-11 DIAGNOSIS — G30.9 ALZHEIMER'S DISEASE, UNSPECIFIED: ICD-10-CM

## 2025-02-11 DIAGNOSIS — S82.009A UNSPECIFIED FRACTURE OF UNSPECIFIED PATELLA, INITIAL ENCOUNTER FOR CLOSED FRACTURE: ICD-10-CM

## 2025-02-11 PROCEDURE — G2211 COMPLEX E/M VISIT ADD ON: CPT | Mod: 2W

## 2025-02-11 PROCEDURE — 99212 OFFICE O/P EST SF 10 MIN: CPT | Mod: 2W

## 2025-02-11 NOTE — PHYSICAL EXAM
[Alert] : alert [No Acute Distress] : in no acute distress [Normal Appearance] : the appearance of the neck was normal [No Respiratory Distress] : no respiratory distress [Normal Color / Pigmentation] : normal skin color and pigmentation [Normal Affect] : the affect was normal [Normal Mood] : the mood was normal

## 2025-02-12 NOTE — HISTORY OF PRESENT ILLNESS
[FreeTextEntry1] : Ms. Loyd is an 89 year old female with a history of dementia and recent patellar fracture last month. She presents today for acute visit in setting of recent viral illness this weekend. She experienced vomiting on Friday 2/7, was fatigued and had mild cough this weekend with temp 99.4-99.5F. She was hemodynamically stable. Daughter was instructed by our office to obtain flu and covid rapid tests, both were negative.   Since then, she has continued to have mild cough and eating and drinking normally. No sore throat, headache, nausea, vomiting, rhinorrhea, congestion. She is voiding normally, no dysuria, no constipation. No sick contacts. No changes in behavior. Temp improved to 97.9F. Daughter has been giving her Mucinex DM.   She continues to have 24 hour HHA. PT and OT has been ongoing on alternate days for right knee flexibility which much improved, hospital bed ordered last visit has arrived.

## 2025-02-12 NOTE — END OF VISIT
[] : Fellow [FreeTextEntry3] : Discussed with fellow. Pt much improved. Agree with note and findings.

## 2025-03-13 ENCOUNTER — APPOINTMENT (OUTPATIENT)
Dept: GERIATRICS | Facility: CLINIC | Age: 89
End: 2025-03-13

## 2025-04-08 ENCOUNTER — RX RENEWAL (OUTPATIENT)
Age: 89
End: 2025-04-08

## 2025-05-20 ENCOUNTER — APPOINTMENT (OUTPATIENT)
Dept: GERIATRICS | Facility: CLINIC | Age: 89
End: 2025-05-20
Payer: MEDICARE

## 2025-05-20 VITALS
DIASTOLIC BLOOD PRESSURE: 83 MMHG | RESPIRATION RATE: 16 BRPM | HEART RATE: 77 BPM | OXYGEN SATURATION: 97 % | SYSTOLIC BLOOD PRESSURE: 152 MMHG | HEIGHT: 59 IN | BODY MASS INDEX: 26.41 KG/M2 | WEIGHT: 131 LBS | TEMPERATURE: 98.3 F

## 2025-05-20 DIAGNOSIS — F32.A ANXIETY DISORDER, UNSPECIFIED: ICD-10-CM

## 2025-05-20 DIAGNOSIS — F02.811 ALZHEIMER'S DISEASE, UNSPECIFIED: ICD-10-CM

## 2025-05-20 DIAGNOSIS — E78.9 DISORDER OF LIPOPROTEIN METABOLISM, UNSPECIFIED: ICD-10-CM

## 2025-05-20 DIAGNOSIS — F41.9 ANXIETY DISORDER, UNSPECIFIED: ICD-10-CM

## 2025-05-20 DIAGNOSIS — G30.9 ALZHEIMER'S DISEASE, UNSPECIFIED: ICD-10-CM

## 2025-05-20 DIAGNOSIS — G47.00 INSOMNIA, UNSPECIFIED: ICD-10-CM

## 2025-05-20 DIAGNOSIS — K21.9 GASTRO-ESOPHAGEAL REFLUX DISEASE W/OUT ESOPHAGITIS: ICD-10-CM

## 2025-05-20 PROCEDURE — 99214 OFFICE O/P EST MOD 30 MIN: CPT

## 2025-05-20 RX ORDER — QUETIAPINE FUMARATE 25 MG/1
25 TABLET ORAL
Qty: 60 | Refills: 3 | Status: ACTIVE | COMMUNITY
Start: 2025-05-20

## 2025-05-22 NOTE — HISTORY OF PRESENT ILLNESS
[FreeTextEntry1] : Ms. Loyd is an 89 year old female with a history of dementia and recent patellar fracture. She presents today for a routine visit. Brought in by aide. Pt living with dtr, goes to day program at [facility]. Pt eating well Sleeping well Pt did have a fall  at home.    She continues to have 24 hour HHA. PT and OT has been ongoing on alternate days for right knee flexibility which much improved, hospital bed ordered last visit has arrived. Pt walking well

## 2025-05-22 NOTE — HISTORY OF PRESENT ILLNESS
[FreeTextEntry1] : Ms. Loyd is an 89 year old female with a history of dementia and recent patellar fracture. She presents today for a routine visit. Brought in by aide. Pt living with dtr, goes to day program at [facility]. Pt eating well Sleeping well Pt did have a fall  at home.    She continues to have 24 hour HHA. PT and OT has been ongoing on alternate days for right knee flexibility which much improved, hospital bed ordered last visit has arrived. Pt walking well

## 2025-07-01 ENCOUNTER — NON-APPOINTMENT (OUTPATIENT)
Age: 89
End: 2025-07-01

## 2025-07-02 ENCOUNTER — APPOINTMENT (OUTPATIENT)
Dept: GERIATRICS | Facility: CLINIC | Age: 89
End: 2025-07-02

## 2025-08-01 ENCOUNTER — APPOINTMENT (OUTPATIENT)
Dept: ENDOCRINOLOGY | Facility: CLINIC | Age: 89
End: 2025-08-01
Payer: MEDICARE

## 2025-08-01 VITALS
OXYGEN SATURATION: 98 % | HEIGHT: 58.8 IN | BODY MASS INDEX: 27.38 KG/M2 | HEART RATE: 76 BPM | DIASTOLIC BLOOD PRESSURE: 84 MMHG | SYSTOLIC BLOOD PRESSURE: 146 MMHG | WEIGHT: 134 LBS

## 2025-08-01 DIAGNOSIS — E03.9 HYPOTHYROIDISM, UNSPECIFIED: ICD-10-CM

## 2025-08-01 DIAGNOSIS — M81.0 AGE-RELATED OSTEOPOROSIS W/OUT CURRENT PATHOLOGICAL FRACTURE: ICD-10-CM

## 2025-08-01 PROCEDURE — 99214 OFFICE O/P EST MOD 30 MIN: CPT

## 2025-08-01 PROCEDURE — G2211 COMPLEX E/M VISIT ADD ON: CPT

## 2025-08-01 PROCEDURE — ZZZZZ: CPT

## 2025-08-01 PROCEDURE — 77080 DXA BONE DENSITY AXIAL: CPT

## 2025-08-02 LAB
25(OH)D3 SERPL-MCNC: 52.4 NG/ML
ALBUMIN SERPL ELPH-MCNC: 4.6 G/DL
ALP BLD-CCNC: 101 U/L
ALT SERPL-CCNC: 12 U/L
ANION GAP SERPL CALC-SCNC: 14 MMOL/L
AST SERPL-CCNC: 22 U/L
BILIRUB SERPL-MCNC: 0.2 MG/DL
BUN SERPL-MCNC: 19 MG/DL
CALCIUM SERPL-MCNC: 9.5 MG/DL
CALCIUM SERPL-MCNC: 9.5 MG/DL
CHLORIDE SERPL-SCNC: 103 MMOL/L
CO2 SERPL-SCNC: 26 MMOL/L
CREAT SERPL-MCNC: 0.91 MG/DL
EGFRCR SERPLBLD CKD-EPI 2021: 61 ML/MIN/1.73M2
GLUCOSE SERPL-MCNC: 90 MG/DL
PARATHYROID HORMONE INTACT: 37 PG/ML
PHOSPHATE SERPL-MCNC: 3.4 MG/DL
POTASSIUM SERPL-SCNC: 4.1 MMOL/L
PROT SERPL-MCNC: 8.3 G/DL
SODIUM SERPL-SCNC: 142 MMOL/L
T4 FREE SERPL-MCNC: 1 NG/DL
TSH SERPL-ACNC: 2.82 UIU/ML

## 2025-08-04 ENCOUNTER — RX RENEWAL (OUTPATIENT)
Age: 89
End: 2025-08-04

## 2025-08-12 LAB — COLLAGEN CTX SERPL-MCNC: 114 PG/ML

## 2025-08-15 ENCOUNTER — NON-APPOINTMENT (OUTPATIENT)
Age: 89
End: 2025-08-15

## 2025-08-21 ENCOUNTER — APPOINTMENT (OUTPATIENT)
Dept: GERIATRICS | Facility: CLINIC | Age: 89
End: 2025-08-21

## 2025-08-21 VITALS
HEART RATE: 80 BPM | OXYGEN SATURATION: 99 % | BODY MASS INDEX: 27.38 KG/M2 | DIASTOLIC BLOOD PRESSURE: 85 MMHG | SYSTOLIC BLOOD PRESSURE: 151 MMHG | HEIGHT: 58.8 IN | RESPIRATION RATE: 15 BRPM | WEIGHT: 134 LBS | TEMPERATURE: 98.1 F

## 2025-08-21 DIAGNOSIS — R14.0 ABDOMINAL DISTENSION (GASEOUS): ICD-10-CM

## 2025-08-21 DIAGNOSIS — F02.811 ALZHEIMER'S DISEASE, UNSPECIFIED: ICD-10-CM

## 2025-08-21 DIAGNOSIS — B02.9 ZOSTER W/OUT COMPLICATIONS: ICD-10-CM

## 2025-08-21 DIAGNOSIS — I10 ESSENTIAL (PRIMARY) HYPERTENSION: ICD-10-CM

## 2025-08-21 DIAGNOSIS — G30.9 ALZHEIMER'S DISEASE, UNSPECIFIED: ICD-10-CM

## 2025-08-21 DIAGNOSIS — L30.9 DERMATITIS, UNSPECIFIED: ICD-10-CM

## 2025-08-21 PROCEDURE — 99214 OFFICE O/P EST MOD 30 MIN: CPT

## 2025-08-21 RX ORDER — SIMETHICONE 125 MG/1
125 TABLET, CHEWABLE ORAL
Qty: 1 | Refills: 0 | Status: ACTIVE | COMMUNITY
Start: 2025-08-21 | End: 1900-01-01

## 2025-08-21 RX ORDER — FEXOFENADINE HCL 180 MG/1
180 TABLET, FILM COATED ORAL DAILY
Qty: 1 | Refills: 0 | Status: ACTIVE | COMMUNITY
Start: 2025-08-21 | End: 1900-01-01

## 2025-08-21 RX ORDER — HYDROCORTISONE 1 G/100G
1 OINTMENT TOPICAL TWICE DAILY
Qty: 1 | Refills: 2 | Status: ACTIVE | COMMUNITY
Start: 2025-08-21 | End: 1900-01-01

## 2025-08-28 ENCOUNTER — APPOINTMENT (OUTPATIENT)
Dept: ENDOCRINOLOGY | Facility: CLINIC | Age: 89
End: 2025-08-28
Payer: MEDICARE

## 2025-08-28 PROCEDURE — 96372 THER/PROPH/DIAG INJ SC/IM: CPT

## 2025-08-28 RX ORDER — DENOSUMAB 60 MG/ML
60 INJECTION SUBCUTANEOUS
Qty: 1 | Refills: 0 | Status: COMPLETED | OUTPATIENT
Start: 2025-08-26

## 2025-08-31 PROBLEM — B02.9 SHINGLES: Status: ACTIVE | Noted: 2025-08-31

## 2025-09-02 ENCOUNTER — RX RENEWAL (OUTPATIENT)
Age: 89
End: 2025-09-02

## 2025-09-11 DIAGNOSIS — R05.9 COUGH, UNSPECIFIED: ICD-10-CM

## 2025-09-15 ENCOUNTER — APPOINTMENT (OUTPATIENT)
Dept: RADIOLOGY | Facility: IMAGING CENTER | Age: 89
End: 2025-09-15
Payer: MEDICARE

## 2025-09-15 PROCEDURE — 71046 X-RAY EXAM CHEST 2 VIEWS: CPT | Mod: 26

## 2025-09-18 ENCOUNTER — NON-APPOINTMENT (OUTPATIENT)
Age: 89
End: 2025-09-18